# Patient Record
Sex: FEMALE | NOT HISPANIC OR LATINO | Employment: OTHER | URBAN - METROPOLITAN AREA
[De-identification: names, ages, dates, MRNs, and addresses within clinical notes are randomized per-mention and may not be internally consistent; named-entity substitution may affect disease eponyms.]

---

## 2017-02-23 ENCOUNTER — OFFICE VISIT (OUTPATIENT)
Dept: RADIATION ONCOLOGY | Facility: HOSPITAL | Age: 82
End: 2017-02-23

## 2017-02-23 ENCOUNTER — HOSPITAL ENCOUNTER (OUTPATIENT)
Dept: RADIATION ONCOLOGY | Facility: HOSPITAL | Age: 82
Setting detail: RADIATION/ONCOLOGY SERIES
Discharge: HOME | End: 2017-02-23

## 2017-02-23 VITALS
SYSTOLIC BLOOD PRESSURE: 202 MMHG | HEIGHT: 64 IN | HEART RATE: 81 BPM | DIASTOLIC BLOOD PRESSURE: 93 MMHG | BODY MASS INDEX: 41.5 KG/M2 | TEMPERATURE: 97.7 F | WEIGHT: 243.1 LBS

## 2017-02-23 DIAGNOSIS — C44.91 BASAL CELL CARCINOMA: Primary | ICD-10-CM

## 2017-02-23 PROCEDURE — G0463 HOSPITAL OUTPT CLINIC VISIT: HCPCS

## 2017-02-23 RX ORDER — LOSARTAN POTASSIUM 50 MG/1
50 TABLET ORAL DAILY
Refills: 5 | COMMUNITY
Start: 2017-01-24

## 2017-02-23 RX ORDER — CLOPIDOGREL BISULFATE 75 MG/1
75 TABLET ORAL DAILY
Refills: 5 | COMMUNITY
Start: 2017-01-24

## 2017-02-23 RX ORDER — HYDROCODONE BITARTRATE AND ACETAMINOPHEN 7.5; 325 MG/1; MG/1
1 TABLET ORAL EVERY 8 HOURS PRN
COMMUNITY
Start: 2017-02-13 | End: 2018-07-09 | Stop reason: HOSPADM

## 2017-02-23 RX ORDER — POTASSIUM CHLORIDE 20 MEQ/1
20 TABLET, EXTENDED RELEASE ORAL DAILY
Refills: 5 | COMMUNITY
Start: 2017-02-03

## 2017-02-23 RX ORDER — POLYETHYLENE GLYCOL 3350 17 G/17G
POWDER, FOR SOLUTION ORAL
Refills: 5 | Status: ON HOLD | COMMUNITY
Start: 2017-01-04 | End: 2018-07-05

## 2017-02-23 RX ORDER — OMEGA-3-ACID ETHYL ESTERS 1 G/1
1 CAPSULE, LIQUID FILLED ORAL 2 TIMES DAILY
Refills: 5 | COMMUNITY
Start: 2017-02-03

## 2017-02-23 RX ORDER — FUROSEMIDE 40 MG/1
40 TABLET ORAL DAILY
Refills: 5 | COMMUNITY
Start: 2017-01-24

## 2017-02-23 NOTE — PROGRESS NOTES
"CONSULTATION NOTE    NAME:      Cheyanne Bella  :                                                          1926  DATE OF CONSULTATION:                       2017   REQUESTING PHYSICIAN:                   Natasha Yeager MD  REASON FOR CONSULTATION:           Basal cell carcinoma of left lateral lower extremity, [id number] stage II       BRIEF HISTORY:  Cheyanne Bella  is a very pleasant 89 y.o. female  with basal cell carcinoma of the left lower extremity.  She was referred by Dr. Yeager for consideration of definitive radiotherapy. She has no complaints of pain but the area oozes blood.      No Known Allergies    Social History   Substance Use Topics   • Smoking status: Never Smoker   • Smokeless tobacco: None   • Alcohol use None       Past Medical History   Diagnosis Date   • Colon cancer    • Hyperlipemia    • Hypertension        family history is not on file.     Past Surgical History   Procedure Laterality Date   • Hysterectomy     • Colon surgery     • Colostomy     • Hernia repair     • Coronary stent placement     • Skin cancer excision          Review of Systems   Musculoskeletal: Positive for back pain and gait problem (pt uses walker).   Neurological: Positive for gait problem (pt uses walker).   All other systems reviewed and are negative.          Objective   VITAL SIGNS:   Vitals:    17 0938   BP: (!) 202/93   Pulse: 81   Temp: 97.7 °F (36.5 °C)   Weight: 243 lb 1.6 oz (110 kg)   Height: 64\" (162.6 cm)   PainSc: 0-No pain        KPS        90%    Physical Exam   Constitutional: She is oriented to person, place, and time. She appears well-developed and well-nourished. No distress.   HENT:   Head: Normocephalic and atraumatic.   Eyes: EOM are normal. No scleral icterus.   Neck: Neck supple.   Cardiovascular: Normal rate, regular rhythm and normal heart sounds.  Exam reveals no gallop and no friction rub.    No murmur heard.  Pulmonary/Chest: Effort normal and breath sounds normal. "   Abdominal: She exhibits no distension. There is no tenderness.   Musculoskeletal: She exhibits edema.   Edema of lower extremities bilaterally   Lymphadenopathy:     She has no cervical adenopathy.   Neurological: She is alert and oriented to person, place, and time.   Skin:   Left lateral lower extremity calf open lesion 4x2cm, heaped edges   Nursing note and vitals reviewed.           The following portions of the patient's history were reviewed and updated as appropriate: allergies, current medications, past family history, past medical history, past social history, past surgical history and problem list.    Assessment        IMPRESSION: Basal cell carcinoma, Clinical stage from 2/23/2017: Stage IIB (T2, N0, M0)       RECOMMENDATIONS:  Mrs. Bella is a good candidate for definitive radiotherapy to this lesion.  I explained to Mrs. Bella and her daughter daily radiation over the treatment course.  The patient lives in [place] and her daughter lives in [place].  They would like to undergo these treatments in [place] by Dr. Kelvin Bauer.  NCCN guidelines recommend 55 Gray in 20 fractions over 4 weeks.  Dr. Bauer might be able to shortness course for this pleasant 89-year-old female.  An appointment was made for them in [place] for radiation on next Thursday.         Courtney Pate MD      Errors in dictation may reflect use of voice recognition software and not all errors in transcription may have been detected prior to signing.

## 2018-07-05 ENCOUNTER — APPOINTMENT (OUTPATIENT)
Dept: GENERAL RADIOLOGY | Facility: HOSPITAL | Age: 83
End: 2018-07-05

## 2018-07-05 ENCOUNTER — APPOINTMENT (OUTPATIENT)
Dept: CT IMAGING | Facility: HOSPITAL | Age: 83
End: 2018-07-05

## 2018-07-05 ENCOUNTER — APPOINTMENT (OUTPATIENT)
Dept: CARDIOLOGY | Facility: HOSPITAL | Age: 83
End: 2018-07-05
Attending: INTERNAL MEDICINE

## 2018-07-05 ENCOUNTER — HOSPITAL ENCOUNTER (INPATIENT)
Facility: HOSPITAL | Age: 83
LOS: 4 days | Discharge: NURSING, REHAB OR LONG-TERM CARE | End: 2018-07-09
Attending: EMERGENCY MEDICINE | Admitting: INTERNAL MEDICINE

## 2018-07-05 DIAGNOSIS — I63.9 ACUTE CVA (CEREBROVASCULAR ACCIDENT) (HCC): Primary | ICD-10-CM

## 2018-07-05 DIAGNOSIS — R13.10 DYSPHAGIA, UNSPECIFIED TYPE: ICD-10-CM

## 2018-07-05 DIAGNOSIS — Z78.9 IMPAIRED MOBILITY AND ADLS: ICD-10-CM

## 2018-07-05 DIAGNOSIS — Z92.82 RECEIVED INTRAVENOUS TISSUE PLASMINOGEN ACTIVATOR (T-PA) IN EMERGENCY DEPARTMENT: ICD-10-CM

## 2018-07-05 DIAGNOSIS — Z74.09 IMPAIRED MOBILITY AND ADLS: ICD-10-CM

## 2018-07-05 DIAGNOSIS — Z74.09 IMPAIRED FUNCTIONAL MOBILITY, BALANCE, GAIT, AND ENDURANCE: ICD-10-CM

## 2018-07-05 DIAGNOSIS — I48.91 ATRIAL FIBRILLATION, UNSPECIFIED TYPE (HCC): ICD-10-CM

## 2018-07-05 PROBLEM — I48.0 PAROXYSMAL ATRIAL FIBRILLATION (HCC): Status: ACTIVE | Noted: 2018-07-05

## 2018-07-05 PROBLEM — I25.10 CORONARY ARTERY DISEASE: Status: ACTIVE | Noted: 2018-07-05

## 2018-07-05 PROBLEM — I10 HYPERTENSION: Status: ACTIVE | Noted: 2018-07-05

## 2018-07-05 PROBLEM — E78.5 HYPERLIPEMIA: Status: ACTIVE | Noted: 2018-07-05

## 2018-07-05 LAB
ALT SERPL W P-5'-P-CCNC: 15 U/L (ref 7–40)
APTT PPP: 27.2 SECONDS (ref 24–31)
AST SERPL-CCNC: 19 U/L (ref 0–33)
BASOPHILS # BLD AUTO: 0.01 10*3/MM3 (ref 0–0.2)
BASOPHILS NFR BLD AUTO: 0.2 % (ref 0–1)
BH CV ECHO MEAS - AO MAX PG (FULL): 1.1 MMHG
BH CV ECHO MEAS - AO MAX PG: 5.9 MMHG
BH CV ECHO MEAS - AO ROOT AREA (BSA CORRECTED): 1.5
BH CV ECHO MEAS - AO ROOT AREA: 7.9 CM^2
BH CV ECHO MEAS - AO ROOT DIAM: 3.2 CM
BH CV ECHO MEAS - AO V2 MAX: 121.2 CM/SEC
BH CV ECHO MEAS - AVA(V,A): 3 CM^2
BH CV ECHO MEAS - AVA(V,D): 3 CM^2
BH CV ECHO MEAS - BSA(HAYCOCK): 2.3 M^2
BH CV ECHO MEAS - BSA: 2.1 M^2
BH CV ECHO MEAS - BZI_BMI: 41.2 KILOGRAMS/M^2
BH CV ECHO MEAS - BZI_METRIC_HEIGHT: 162.6 CM
BH CV ECHO MEAS - BZI_METRIC_WEIGHT: 108.9 KG
BH CV ECHO MEAS - EDV(CUBED): 75.6 ML
BH CV ECHO MEAS - EDV(TEICH): 79.8 ML
BH CV ECHO MEAS - EF(CUBED): 61.9 %
BH CV ECHO MEAS - EF(TEICH): 53.8 %
BH CV ECHO MEAS - ESV(CUBED): 28.8 ML
BH CV ECHO MEAS - ESV(TEICH): 36.9 ML
BH CV ECHO MEAS - FS: 27.5 %
BH CV ECHO MEAS - IVS/LVPW: 0.94
BH CV ECHO MEAS - IVSD: 1.2 CM
BH CV ECHO MEAS - LA DIMENSION: 3.4 CM
BH CV ECHO MEAS - LA/AO: 1.1
BH CV ECHO MEAS - LAT PEAK E' VEL: 6.7 CM/SEC
BH CV ECHO MEAS - LV MASS(C)D: 196.2 GRAMS
BH CV ECHO MEAS - LV MASS(C)DI: 92.8 GRAMS/M^2
BH CV ECHO MEAS - LV MAX PG: 4.8 MMHG
BH CV ECHO MEAS - LV MEAN PG: 2.6 MMHG
BH CV ECHO MEAS - LV V1 MAX: 109.1 CM/SEC
BH CV ECHO MEAS - LV V1 MEAN: 74.4 CM/SEC
BH CV ECHO MEAS - LV V1 VTI: 18.8 CM
BH CV ECHO MEAS - LVIDD: 4.2 CM
BH CV ECHO MEAS - LVIDS: 3.1 CM
BH CV ECHO MEAS - LVOT AREA (M): 3.1 CM^2
BH CV ECHO MEAS - LVOT AREA: 3.3 CM^2
BH CV ECHO MEAS - LVOT DIAM: 2 CM
BH CV ECHO MEAS - LVPWD: 1.3 CM
BH CV ECHO MEAS - MED PEAK E' VEL: 6 CM/SEC
BH CV ECHO MEAS - MV DEC TIME: 0.25 SEC
BH CV ECHO MEAS - MV E MAX VEL: 149.2 CM/SEC
BH CV ECHO MEAS - MV MAX PG: 10.6 MMHG
BH CV ECHO MEAS - MV MEAN PG: 5.2 MMHG
BH CV ECHO MEAS - MV V2 MAX: 162.9 CM/SEC
BH CV ECHO MEAS - MV V2 MEAN: 106.8 CM/SEC
BH CV ECHO MEAS - MV V2 VTI: 33.6 CM
BH CV ECHO MEAS - MVA(VTI): 1.8 CM^2
BH CV ECHO MEAS - PA ACC SLOPE: 1262 CM/SEC^2
BH CV ECHO MEAS - PA ACC TIME: 0.07 SEC
BH CV ECHO MEAS - PA MAX PG: 5.5 MMHG
BH CV ECHO MEAS - PA PR(ACCEL): 45.7 MMHG
BH CV ECHO MEAS - PA V2 MAX: 117.3 CM/SEC
BH CV ECHO MEAS - PI END-D VEL: 178.1 CM/SEC
BH CV ECHO MEAS - RAP SYSTOLE: 8 MMHG
BH CV ECHO MEAS - RVSP: 55 MMHG
BH CV ECHO MEAS - SI(CUBED): 22.1 ML/M^2
BH CV ECHO MEAS - SI(LVOT): 29.2 ML/M^2
BH CV ECHO MEAS - SI(TEICH): 20.3 ML/M^2
BH CV ECHO MEAS - SV(CUBED): 46.7 ML
BH CV ECHO MEAS - SV(LVOT): 61.8 ML
BH CV ECHO MEAS - SV(TEICH): 42.9 ML
BH CV ECHO MEAS - TR MAX V: 47 MMHG
BH CV ECHO MEAS - TR MAX VEL: 342 CM/SEC
BH CV ECHO MEASUREMENTS AVERAGE E/E' RATIO: 23.5
BUN BLDA-MCNC: 19 MG/DL (ref 8–26)
CA-I BLDA-SCNC: 1.23 MMOL/L (ref 1.2–1.32)
CHLORIDE BLDA-SCNC: 100 MMOL/L (ref 98–109)
CO2 BLDA-SCNC: 28 MMOL/L (ref 24–29)
CREAT BLDA-MCNC: 1 MG/DL (ref 0.6–1.3)
DEPRECATED RDW RBC AUTO: 40.3 FL (ref 37–54)
EOSINOPHIL # BLD AUTO: 0.05 10*3/MM3 (ref 0–0.3)
EOSINOPHIL NFR BLD AUTO: 1 % (ref 0–3)
ERYTHROCYTE [DISTWIDTH] IN BLOOD BY AUTOMATED COUNT: 13.4 % (ref 11.3–14.5)
GLUCOSE BLDC GLUCOMTR-MCNC: 128 MG/DL (ref 70–130)
GLUCOSE BLDC GLUCOMTR-MCNC: 152 MG/DL (ref 70–130)
HCT VFR BLD AUTO: 35.5 % (ref 34.5–44)
HCT VFR BLDA CALC: 34 % (ref 38–51)
HGB BLD-MCNC: 11.8 G/DL (ref 11.5–15.5)
HGB BLDA-MCNC: 11.6 G/DL (ref 12–17)
HOLD SPECIMEN: NORMAL
HOLD SPECIMEN: NORMAL
IMM GRANULOCYTES # BLD: 0.02 10*3/MM3 (ref 0–0.03)
IMM GRANULOCYTES NFR BLD: 0.4 % (ref 0–0.6)
INR PPP: 0.9 (ref 0.8–1.2)
LEFT ATRIUM VOLUME INDEX: 35 ML/M2
LEFT ATRIUM VOLUME: 75 CM3
LYMPHOCYTES # BLD AUTO: 1.33 10*3/MM3 (ref 0.6–4.8)
LYMPHOCYTES NFR BLD AUTO: 26.1 % (ref 24–44)
MAXIMAL PREDICTED HEART RATE: 128 BPM
MCH RBC QN AUTO: 27.9 PG (ref 27–31)
MCHC RBC AUTO-ENTMCNC: 33.2 G/DL (ref 32–36)
MCV RBC AUTO: 83.9 FL (ref 80–99)
MONOCYTES # BLD AUTO: 0.36 10*3/MM3 (ref 0–1)
MONOCYTES NFR BLD AUTO: 7.1 % (ref 0–12)
NEUTROPHILS # BLD AUTO: 3.35 10*3/MM3 (ref 1.5–8.3)
NEUTROPHILS NFR BLD AUTO: 65.6 % (ref 41–71)
PLATELET # BLD AUTO: 145 10*3/MM3 (ref 150–450)
PMV BLD AUTO: 9.3 FL (ref 6–12)
POTASSIUM BLDA-SCNC: 4.5 MMOL/L (ref 3.5–4.9)
PROTHROMBIN TIME: 11 SECONDS (ref 12.8–15.2)
RBC # BLD AUTO: 4.23 10*6/MM3 (ref 3.89–5.14)
SODIUM BLDA-SCNC: 140 MMOL/L (ref 138–146)
STRESS TARGET HR: 109 BPM
TROPONIN I SERPL-MCNC: 0 NG/ML (ref 0–0.07)
WBC NRBC COR # BLD: 5.1 10*3/MM3 (ref 3.5–10.8)
WHOLE BLOOD HOLD SPECIMEN: NORMAL
WHOLE BLOOD HOLD SPECIMEN: NORMAL

## 2018-07-05 PROCEDURE — 84450 TRANSFERASE (AST) (SGOT): CPT | Performed by: EMERGENCY MEDICINE

## 2018-07-05 PROCEDURE — 71045 X-RAY EXAM CHEST 1 VIEW: CPT

## 2018-07-05 PROCEDURE — 0042T HC CT CEREBRAL PERFUSION W/WO CONTRAST: CPT

## 2018-07-05 PROCEDURE — 92610 EVALUATE SWALLOWING FUNCTION: CPT

## 2018-07-05 PROCEDURE — 99222 1ST HOSP IP/OBS MODERATE 55: CPT | Performed by: NURSE PRACTITIONER

## 2018-07-05 PROCEDURE — 93306 TTE W/DOPPLER COMPLETE: CPT

## 2018-07-05 PROCEDURE — 70498 CT ANGIOGRAPHY NECK: CPT

## 2018-07-05 PROCEDURE — 70450 CT HEAD/BRAIN W/O DYE: CPT

## 2018-07-05 PROCEDURE — 80047 BASIC METABLC PNL IONIZED CA: CPT

## 2018-07-05 PROCEDURE — 85730 THROMBOPLASTIN TIME PARTIAL: CPT | Performed by: EMERGENCY MEDICINE

## 2018-07-05 PROCEDURE — 85610 PROTHROMBIN TIME: CPT

## 2018-07-05 PROCEDURE — 82962 GLUCOSE BLOOD TEST: CPT

## 2018-07-05 PROCEDURE — 99285 EMERGENCY DEPT VISIT HI MDM: CPT

## 2018-07-05 PROCEDURE — 99291 CRITICAL CARE FIRST HOUR: CPT | Performed by: INTERNAL MEDICINE

## 2018-07-05 PROCEDURE — 84460 ALANINE AMINO (ALT) (SGPT): CPT | Performed by: EMERGENCY MEDICINE

## 2018-07-05 PROCEDURE — 0 IOPAMIDOL PER 1 ML: Performed by: EMERGENCY MEDICINE

## 2018-07-05 PROCEDURE — 99223 1ST HOSP IP/OBS HIGH 75: CPT | Performed by: PSYCHIATRY & NEUROLOGY

## 2018-07-05 PROCEDURE — 93306 TTE W/DOPPLER COMPLETE: CPT | Performed by: INTERNAL MEDICINE

## 2018-07-05 PROCEDURE — 70496 CT ANGIOGRAPHY HEAD: CPT

## 2018-07-05 PROCEDURE — 85014 HEMATOCRIT: CPT

## 2018-07-05 PROCEDURE — 93005 ELECTROCARDIOGRAM TRACING: CPT | Performed by: EMERGENCY MEDICINE

## 2018-07-05 PROCEDURE — 25010000002 MORPHINE PER 10 MG: Performed by: NURSE PRACTITIONER

## 2018-07-05 PROCEDURE — 25010000002 ALTEPLASE PER 1 MG: Performed by: EMERGENCY MEDICINE

## 2018-07-05 PROCEDURE — 85025 COMPLETE CBC W/AUTO DIFF WBC: CPT | Performed by: EMERGENCY MEDICINE

## 2018-07-05 PROCEDURE — 84484 ASSAY OF TROPONIN QUANT: CPT

## 2018-07-05 PROCEDURE — 3E03317 INTRODUCTION OF OTHER THROMBOLYTIC INTO PERIPHERAL VEIN, PERCUTANEOUS APPROACH: ICD-10-PCS | Performed by: EMERGENCY MEDICINE

## 2018-07-05 PROCEDURE — 25010000002 SULFUR HEXAFLUORIDE MICROSPH 60.7-25 MG RECONSTITUTED SUSPENSION: Performed by: INTERNAL MEDICINE

## 2018-07-05 RX ORDER — ATORVASTATIN CALCIUM 40 MG/1
80 TABLET, FILM COATED ORAL NIGHTLY
Status: DISCONTINUED | OUTPATIENT
Start: 2018-07-05 | End: 2018-07-09 | Stop reason: HOSPADM

## 2018-07-05 RX ORDER — MORPHINE SULFATE 1 MG/ML
1 INJECTION, SOLUTION EPIDURAL; INTRATHECAL; INTRAVENOUS EVERY 4 HOURS PRN
Status: DISPENSED | OUTPATIENT
Start: 2018-07-05 | End: 2018-07-06

## 2018-07-05 RX ORDER — SODIUM CHLORIDE 0.9 % (FLUSH) 0.9 %
10 SYRINGE (ML) INJECTION AS NEEDED
Status: DISCONTINUED | OUTPATIENT
Start: 2018-07-05 | End: 2018-07-09 | Stop reason: HOSPADM

## 2018-07-05 RX ORDER — SODIUM CHLORIDE 0.9 % (FLUSH) 0.9 %
1-10 SYRINGE (ML) INJECTION AS NEEDED
Status: DISCONTINUED | OUTPATIENT
Start: 2018-07-05 | End: 2018-07-09 | Stop reason: HOSPADM

## 2018-07-05 RX ORDER — SODIUM CHLORIDE 9 MG/ML
100 INJECTION, SOLUTION INTRAVENOUS ONCE
Status: COMPLETED | OUTPATIENT
Start: 2018-07-05 | End: 2018-07-05

## 2018-07-05 RX ORDER — HYDROCODONE BITARTRATE AND ACETAMINOPHEN 7.5; 325 MG/1; MG/1
1 TABLET ORAL EVERY 6 HOURS PRN
Status: DISCONTINUED | OUTPATIENT
Start: 2018-07-05 | End: 2018-07-09 | Stop reason: HOSPADM

## 2018-07-05 RX ORDER — ASPIRIN 300 MG/1
300 SUPPOSITORY RECTAL DAILY
Status: DISCONTINUED | OUTPATIENT
Start: 2018-07-06 | End: 2018-07-06

## 2018-07-05 RX ORDER — ASPIRIN 325 MG
325 TABLET ORAL DAILY
Status: DISCONTINUED | OUTPATIENT
Start: 2018-07-06 | End: 2018-07-06

## 2018-07-05 RX ORDER — FUROSEMIDE 40 MG/1
40 TABLET ORAL DAILY
Status: DISCONTINUED | OUTPATIENT
Start: 2018-07-06 | End: 2018-07-09 | Stop reason: HOSPADM

## 2018-07-05 RX ORDER — LOSARTAN POTASSIUM 50 MG/1
50 TABLET ORAL
Status: DISCONTINUED | OUTPATIENT
Start: 2018-07-06 | End: 2018-07-09 | Stop reason: HOSPADM

## 2018-07-05 RX ADMIN — WATER 9 MG: 1 INJECTION INTRAMUSCULAR; INTRAVENOUS; SUBCUTANEOUS at 12:15

## 2018-07-05 RX ADMIN — Medication 1 MG: at 20:49

## 2018-07-05 RX ADMIN — IOPAMIDOL 115 ML: 755 INJECTION, SOLUTION INTRAVENOUS at 12:16

## 2018-07-05 RX ADMIN — SULFUR HEXAFLUORIDE 2 ML: KIT at 17:00

## 2018-07-05 RX ADMIN — ALTEPLASE 81 MG: KIT at 12:16

## 2018-07-05 RX ADMIN — SODIUM CHLORIDE 50 ML: 9 INJECTION, SOLUTION INTRAVENOUS at 13:12

## 2018-07-05 NOTE — PROGRESS NOTES
Discharge Planning Assessment  [facility]     Patient Name: Cheyanne Bella  MRN: [id number]  Today's Date: 7/5/2018    Admit Date: 7/5/2018          Discharge Needs Assessment     Row Name 07/05/18 1420       Living Environment    Lives With alone    Name(s) of Who Lives With Patient Pt lives in a senior independent living in [facility]    Current Living Arrangements independent/assisted living facility    Primary Care Provided by self    Provides Primary Care For no one    Family Caregiver if Needed child(karen), adult    Quality of Family Relationships helpful;involved;supportive    Able to Return to Prior Arrangements yes    Living Arrangement Comments Pt lives in a Senior Independent Living in [facility].       Transition Planning    Transportation Anticipated family or friend will provide       Discharge Needs Assessment    Readmission Within the Last 30 Days no previous admission in last 30 days    Concerns to be Addressed no discharge needs identified    Equipment Currently Used at Home walker, rolling;colostomy/ostomy supplies    Equipment Needed After Discharge none            Discharge Plan     Row Name 07/05/18 1429       Plan    Plan Home    Patient/Family in Agreement with Plan yes    Plan Comments Met with patient and her daughter Pamela  [phone], in room patient lives  at  a senior independent living in [facility]. Pt does use a RW to ambulate. Pt has had [facility] in the past for colostomy care. Pt has a PCP. She has insurance to cover her medication she gets her medication at [facility] pharmacy in [facility].    Final Discharge Disposition Code 01 - home or self-care        Destination     No service coordination in this encounter.      Durable Medical Equipment     No service coordination in this encounter.      Dialysis/Infusion     No service coordination in this encounter.      Home Medical Care     No service coordination in this encounter.      Social Care     No service coordination in  this encounter.                Demographic Summary     Row Name 07/05/18 1419       General Information    Admission Type inpatient    Arrived From emergency department    Referral Source admission list    Reason for Consult discharge planning    Preferred Language English       Contact Information    Permission Granted to Share Info With             Functional Status     Row Name 07/05/18 1420       Functional Status    Usual Activity Tolerance moderate    Functional Status Comments independent       Functional Status, IADL    Medications independent    Meal Preparation independent    Housekeeping independent    Laundry independent    Shopping independent    IADL Comments independent            Psychosocial    No documentation.           Abuse/Neglect    No documentation.           Legal    No documentation.           Substance Abuse    No documentation.           Patient Forms    No documentation.         Miley Ugalde RN

## 2018-07-05 NOTE — CONSULTS
NAME: RADHA ELIAS  : 1926  PCP: Tomi Allen MD  Attending MD: Nick Hannon MD    Date of Admission:  2018  Date of Service: 2018    History of Present Illness:  89 y.o.  female who presented to the ED with LKW of 0830 this morning and arrival time of 1157 via [employer] after EMS was called to the scene where family and neighbors noted left sided facial droop as well as slurred speech and left sided weakness.  She has a PMH of HTN, hyperlipidemia, colon cancer, and bypass surgery as well as cardiac stents x2.      Review of Systems - Negative except Neurological  Neurological ROS: positive for - speech problems, weakness noted on the left side and left facial droop  All other systesm reviewed and are negative  This was obtained from the patient      Past Medical History:  Past Medical History:   Diagnosis Date   • Colon cancer (CMS/HCC)    • Coronary artery disease    • Hyperlipemia    • Hypertension        Past Surgical History:  Past Surgical History:   Procedure Laterality Date   • COLON SURGERY     • COLOSTOMY     • CORONARY STENT PLACEMENT     • HERNIA REPAIR     • HYSTERECTOMY     • SKIN CANCER EXCISION           Medications    (Not in a hospital admission)    Allergies:  No Known Allergies    Social Hx:  Social History     Social History   • Marital status:      Spouse name: N/A   • Number of children: N/A   • Years of education: N/A     Occupational History   • Not on file.     Social History Main Topics   • Smoking status: Never Smoker   • Smokeless tobacco: Not on file   • Alcohol use Not on file   • Drug use: Unknown   • Sexual activity: Not on file     Other Topics Concern   • Not on file     Social History Narrative   • No narrative on file       Family Hx:  History reviewed. No pertinent family history.    Review of Imaging:  Ct Angiogram Neck With & Without Contrast    Result Date: 2018  EXAMINATION: CT ANGIOGRAM NECK W WO CONTRAST- 2018  INDICATION:  Stroke; I63.9-Cerebral infarction, unspecified; I48.91-Unspecified atrial fibrillation; Z92.82-Status post administration of TPA (RTPA) in a different facility within the last 24 hours prior to admission to current facility     TECHNIQUE: Pre and postcontrast 0.75 mm axial images through the neck with sagittal and coronal 2-D reconstructions.  The radiation dose reduction device was turned on for each scan per the ALARA (As Low as Reasonably Achievable) protocol.  COMPARISON: NONE  FINDINGS: Proximal subclavian arteries appear normally patent.  On the right, no significant common, internal, or external carotid artery stenosis is seen.  On the left, no significant common, internal or external carotid artery stenosis is seen.  Vertebral arteries appear to be codominant and normal in appearance.      Neck CTA appears within normal limits.  D:  07/05/2018 E:  07/05/2018      Ct Head Without Contrast Stroke Protocol    Result Date: 7/5/2018  EXAMINATION: CT HEAD WO CONTRAST STROKE PROTOCOL-  INDICATION: Stroke.  TECHNIQUE: 5 mm unenhanced images through the brain.  The radiation dose reduction device was turned on for each scan per the ALARA (As Low as Reasonably Achievable) protocol.  COMPARISON: None.  FINDINGS: The calvarium appears intact. Included paranasal sinuses and mastoids appear clear. Soft tissue window images show mild generalized cerebral atrophy for patient's age. Some mild patchy white matter changes are seen in the subcortical white matter of the frontal lobes, likely chronic. There is no evidence to suggest acute infarction, no evidence of intracranial hemorrhage, mass or mass effect, hydrocephalus, or abnormal extraaxial collection.      No evidence of acute intracranial disease.  NOTE: Exam time is shown as 11:54 AM. Preliminary report was given to Dr. Cardenas at 11:57 AM  D:  07/05/2018 E:  07/05/2018          Laboratory Result:  Lab Results   Component Value Date    WBC 5.10 07/05/2018    HGB 11.8  07/05/2018    HCT 35.5 07/05/2018    MCV 83.9 07/05/2018     (L) 07/05/2018     Lab Results   Component Value Date    CREATININE 1.00 07/05/2018     No results found for: HGBA1C  No results found for: CHOL, CHLPL, TRIG, HDL, LDL, LDLDIRECT    Physical Examination:  Vitals:    07/05/18 1332   BP:    Pulse: 111   Resp:    Temp:    SpO2: 93%        General Appearance:   Well developed, well nourished, well groomed, alert, and cooperative.  Cardiovascular: Atrial fibrillation as noted on the monitor. No carotid bruits    Neurological examination:  Interval: baseline  1a. Level of Consciousness: 0-->Alert, keenly responsive  1b. LOC Questions: 0-->Answers both questions correctly  1c. LOC Commands: 0-->Performs both tasks correctly  2. Best Gaze: 1-->Partial gaze palsy, gaze is abnormal in one or both eyes, but forced deviation or total gaze paresis is not present  3. Visual: 1-->Partial hemianopia  4. Facial Palsy: 2-->Partial paralysis (total or near-total paralysis of lower face)  5a. Motor Arm, Left: 0-->No drift, limb holds 90 (or 45) degrees for full 10 secs  5b. Motor Arm, Right: 0-->No drift, limb holds 90 (or 45) degrees for full 10 secs  6a. Motor Leg, Left: 1-->Drift, leg falls by the end of the 5-sec period but does not hit bed  6b. Motor Leg, Right: 0-->No drift, leg holds 30 degree position for full 5 secs  7. Limb Ataxia: 0-->Absent  8. Sensory: 0-->Normal, no sensory loss  9. Best Language: 1-->Mild-to-moderate aphasia, some obvious loss of fluency or facility of comprehension, without significant limitation on ideas expressed or form of expression. Reduction of speech and/or comprehension, however, makes conversation. . . (see row details)  10. Dysarthria: 1-->Mild-to-moderate dysarthria, patient slurs at least some words and, at worst, can be understood with some difficulty  11. Extinction and Inattention (formerly Neglect): 0-->No abnormality    Total (NIH Stroke Scale): 7      Diagnoses/Plan:     Ms. Bella is a 89 y.o. female who arrived at [facility] for evaluation of stroke like symptoms.  She was taken to the CT scanner where it was determined that she was suffering from an acute embolic event most likely caused from atrial fib.  She met criteria for alteplase and it was administered, therefore she will need an ICU admission with Neurology consult. She will need a stoke order set placed and workup performed.  On imaging it appears that she has an M2 occlusion but is getting better with alteplase administration and does not need neuro-intervention at the present time.  If she should deteriorate please so not hesitate to re-consult us again.

## 2018-07-05 NOTE — CONSULTS
Neurology    Referring provider:   No referring provider defined for this encounter.    Reason for Consultation: Left-sided weakness    Chief complaint: Left-sided weakness    History of present illness:  This 89-year-old woman is seems request of Dr. valladares for evaluation of stroke.    Consulted by Dr. Hannon in the emergency department with the patient had an NIH score of 5.  She had a small core infarct of a significant penumbra with what appeared to be a right M2 segment occlusion on CT angiogram.    She's not had a stroke in the past.    His headache or visual changes.    Patient received TPA in the emergency room at 3-1/2 hours once a    She has a past history of hypertension and dyslipidemia.    She's had colon cancer of bypass surgery cardiac stents ×2.        Review of Systems: All systems reviewed and are negative other than history of present illness    Home meds:   Prescriptions Prior to Admission   Medication Sig Dispense Refill Last Dose   • clopidogrel (PLAVIX) 75 MG tablet Take 75 mg by mouth Daily.  5 Taking   • furosemide (LASIX) 40 MG tablet Take 40 mg by mouth Daily.  5 Taking   • HYDROcodone-acetaminophen (NORCO) 7.5-325 MG per tablet Take 1 tablet by mouth Every 8 (Eight) Hours As Needed.   Taking   • losartan (COZAAR) 50 MG tablet Take 50 mg by mouth Daily.  5 Taking   • Magnesium Hydroxide (MILK OF MAGNESIA PO) Take 30 mL by mouth Every Other Day.      • potassium chloride (K-DUR,KLOR-CON) 20 MEQ CR tablet Take 20 mEq by mouth Daily.  5 Taking   • omega-3 acid ethyl esters (LOVAZA) 1 G capsule Take 1 g by mouth 2 (Two) Times a Day.  5 Taking       History  Past Medical History:   Diagnosis Date   • Colon cancer (CMS/HCC)    • Coronary artery disease    • Hyperlipemia    • Hypertension    ,   Past Surgical History:   Procedure Laterality Date   • COLON SURGERY     • COLOSTOMY     • CORONARY STENT PLACEMENT     • HERNIA REPAIR     • HYSTERECTOMY     • SKIN CANCER EXCISION     , History reviewed.  "No pertinent family history.,   Social History   Substance Use Topics   • Smoking status: Never Smoker   • Smokeless tobacco: Not on file   • Alcohol use Not on file    and Allergies:  Patient has no known allergies.,    Vital Signs   Blood pressure 157/78, pulse 92, temperature 97.6 °F (36.4 °C), temperature source Oral, resp. rate 18, height 162.6 cm (64\"), weight 109 kg (240 lb), SpO2 94 %.  Body mass index is 41.2 kg/m².    Physical Exam:   General: Pleasant white female in no distress              Head: No weakness of trauma               neck: No bruits              Resp:  Normal breath sounds              Cor:  Regular rhythm              Extr:  No edema              Skin:  Warm and dry               Neuro:  Lethargic but awake and alert.  Oriented to person place and time.    Fund of knowledge is normal.    Speech is mildly dysarthric.    Coordination is clumsy on finger-nose testing on the left.    Cranial nerves show normal optic fundi.  Eye movements are full.  Pupils are equal and reactive.    Visual fields show a left homonymous hemianopsia.    Facial sensation is notable for the neglect on double simultaneous stimulation on the left.  She has left central facial weakness.    Tongue deviates slightly to the left.    Palate elevates normally.    Reflexes are plus minus throughout.    Motor testing shows mild clumsiness with use of her left hand.    She is antigravity with her left leg.    Sensory testing shows normal peripheral sensation was decreased sensation on the left to double simultaneous stimulation.    Results Review:  CT angiogram shows a right M2 occlusion.    CT perfusion scan shows a 4 x 8 cm right parietal lobe area of ischemia with significant penumbra small core infarct    Labs:  Lab Results (last 72 hours)     Procedure Component Value Units Date/Time    [place] Draw [[id number]] Collected:  07/05/18 1215    Specimen:  Blood Updated:  07/05/18 1330    Narrative:       The following orders " were created for panel order [place] Draw.  Procedure                               Abnormality         Status                     ---------                               -----------         ------                     Light Blue Top[[id number]]                                   Final result               Green Top (Gel)[[id number]]                                  Final result               Lavender Top[[id number]]                                     Final result               Gold Top - SST[[id number]]                                   Final result               Green Top (No Gel)[[id number]]                                                            Please view results for these tests on the individual orders.    Light Blue Top [[id number]] Collected:  07/05/18 1215    Specimen:  Blood Updated:  07/05/18 1330     Extra Tube hold for add-on     Comment: Auto resulted       Green Top (Gel) [[id number]] Collected:  07/05/18 1215    Specimen:  Blood Updated:  07/05/18 1330     Extra Tube Hold for add-ons.     Comment: Auto resulted.       Lavender Top [[id number]] Collected:  07/05/18 1215    Specimen:  Blood Updated:  07/05/18 1330     Extra Tube hold for add-on     Comment: Auto resulted       Gold Top - SST [[id number]] Collected:  07/05/18 1215    Specimen:  Blood Updated:  07/05/18 1330     Extra Tube Hold for add-ons.     Comment: Auto resulted.       AST [[id number]]  (Normal) Collected:  07/05/18 1215    Specimen:  Blood Updated:  07/05/18 1242     AST (SGOT) 19 U/L     ALT [[id number]]  (Normal) Collected:  07/05/18 1215    Specimen:  Blood Updated:  07/05/18 1242     ALT (SGPT) 15 U/L     aPTT [[id number]]  (Normal) Collected:  07/05/18 1215    Specimen:  Blood Updated:  07/05/18 1236     PTT 27.2 seconds     Narrative:       PTT = The equivalent PTT values for the therapeutic range of heparin levels at 0.3 to 0.5 U/ml are 55 to 70 seconds.    POC Troponin, Rapid [[id number]]  (Normal) Collected:  07/05/18 1215     Specimen:  Blood Updated:  07/05/18 1230     Troponin I 0.00 ng/mL      Comment: Serial Number: [id number]  [id number]       CBC & Differential [[id number]] Collected:  07/05/18 1215    Specimen:  Blood Updated:  07/05/18 1221    Narrative:       The following orders were created for panel order CBC & Differential.  Procedure                               Abnormality         Status                     ---------                               -----------         ------                     CBC Auto Differential[[id number]]        Abnormal            Final result                 Please view results for these tests on the individual orders.    CBC Auto Differential [[id number]]  (Abnormal) Collected:  07/05/18 1215    Specimen:  Blood Updated:  07/05/18 1221     WBC 5.10 10*3/mm3      RBC 4.23 10*6/mm3      Hemoglobin 11.8 g/dL      Hematocrit 35.5 %      MCV 83.9 fL      MCH 27.9 pg      MCHC 33.2 g/dL      RDW 13.4 %      RDW-SD 40.3 fl      MPV 9.3 fL      Platelets 145 (L) 10*3/mm3      Neutrophil % 65.6 %      Lymphocyte % 26.1 %      Monocyte % 7.1 %      Eosinophil % 1.0 %      Basophil % 0.2 %      Immature Grans % 0.4 %      Neutrophils, Absolute 3.35 10*3/mm3      Lymphocytes, Absolute 1.33 10*3/mm3      Monocytes, Absolute 0.36 10*3/mm3      Eosinophils, Absolute 0.05 10*3/mm3      Basophils, Absolute 0.01 10*3/mm3      Immature Grans, Absolute 0.02 10*3/mm3     POC CHEM 8 [[id number]]  (Abnormal) Collected:  07/05/18 1209    Specimen:  Blood Updated:  07/05/18 1215     Glucose 128 mg/dL      BUN 19 mg/dL      Creatinine 1.00 mg/dL      Sodium 140 mmol/L      Potassium 4.5 mmol/L      Chloride 100 mmol/L      Total CO2 28 mmol/L      Hemoglobin 11.6 (L) g/dL      Comment: Serial Number: [id number]:  [id number]        Hematocrit 34 (L) %      Ionized Calcium 1.23 mmol/L     POC Protime / INR [[id number]]  (Abnormal) Collected:  07/05/18 1208    Specimen:  Blood Updated:  07/05/18 1215     Protime 11.0 (L) seconds       INR 0.9     Comment: Serial Number: [id number]:  [id number]             Rads:  Imaging Results (last 72 hours)     Procedure Component Value Units Date/Time    CT Angiogram Head With & Without Contrast [[id number]] Collected:  07/05/18 1448     Updated:  07/05/18 1448    Narrative:       EXAMINATION: CT ANGIOGRAM HEAD W WO CONTRAST- 07/05/2018     INDICATION: Stroke; I63.9-Cerebral infarction, unspecified;  I48.91-Unspecified atrial fibrillation; Z92.82-Status post  administration of TPA (RTPA) in a different facility within the last 24  hours prior to admission to current facility         TECHNIQUE: Pre and post contrast 0.75 mm axial images through the brain  with axial, sagittal and coronal 2-D angiographic reconstructions of the  major intracranial arteries.     The radiation dose reduction device was turned on for each scan per the  ALARA (As Low as Reasonably Achievable) protocol.     COMPARISON: Cerebral perfusion head CT scan of same date.     FINDINGS:  Proximal intracranial internal carotid arteries appear  grossly normal. No high-grade stenosis is seen of the cavernous or  supraclinoid portions of the right and left ICA. Distal vertebral  arteries, basilar artery, and posterior cerebral arteries appear grossly  normal. Anterior cerebral arteries appear grossly normal. Left middle  cerebral arteries and proximal branches appear normal, larger than the  right. On the right, there is high-grade branch stenosis or occlusion of  the superior temporal artery as seen on sagittal image numbers 27  through 29. No other focal stenosis is present.        Impression:       Right M2 branch occlusion or high-grade stenosis. Please  refer to sagittal images 27 through 29 series 9.     D:  07/05/2018  E:  07/05/2018       CT Cerebral Perfusion With & Without Contrast [[id number]] Collected:  07/05/18 1441     Updated:  07/05/18 1441    Narrative:       EXAMINATION: CT CEREBRAL PERFUSION W WO CONTRAST- 07/05/2018      INDICATION: TIA, initial screening; I63.9-Cerebral infarction,  unspecified; I48.91-Unspecified atrial fibrillation; Z92.82-Status post  administration of TPA (RTPA) in a different facility within the last 24  hours prior to admission to current facility         TECHNIQUE: Cerebral perfusion analysis was performed using computed  tomography with contrast administration including postprocessing of  parametric maps with determination of cerebral blood flow, cerebral  blood volume, and mean transit time.     The radiation dose reduction device was turned on for each scan per the  ALARA (As Low as Reasonably Achievable) protocol.     COMPARISON: Unenhanced head CT scan of same date.      FINDINGS: There is an approximately 8 x 4 cm area of increased mean  transit time and time to drain in the posterior and superior right  frontal lobe corresponding to similar sized area of decreased cerebral  blood flow. There is a small irregular underlying area of decreased  cerebral blood volume approximately 3.5 x 1.5 cm, consistent with small  core infarct. No significant perfusion abnormality is seen elsewhere.       Impression:       8 x 4 cm area of ischemia in the posterior and superior  right frontal lobe, with small underlying core infarct.     D:  07/05/2018  E:  07/05/2018       XR Chest 1 View [[id number]] Collected:  07/05/18 1417     Updated:  07/05/18 1417    Narrative:       EXAMINATION: XR CHEST 1 VW- 07/05/2018     INDICATION: Acute Stroke Protocol (onset < 12 hrs); I63.9-Cerebral  infarction, unspecified; I48.91-Unspecified atrial fibrillation;  Z92.82-Status post administration of tPA (rtPA) in a different facility  within the last 24 hours prior to admission to current facility      COMPARISON: NONE     FINDINGS: Heart shadow is mildly enlarged. Vasculature is slightly  cephalized. There is perihilar interstitial change, possibly very early  interstitial edema, and minimal left basilar atelectasis.  No  pneumothorax is seen.       Impression:       Cardiomegaly and mild pulmonary vascular congestion. Mild  left basilar atelectasis noted.     D:  07/05/2018  E:  07/05/2018          CT Angiogram Neck With & Without Contrast [[id number]] Collected:  07/05/18 1312     Updated:  07/05/18 1312    Narrative:       EXAMINATION: CT ANGIOGRAM NECK W WO CONTRAST- 07/05/2018     INDICATION: Stroke; I63.9-Cerebral infarction, unspecified;  I48.91-Unspecified atrial fibrillation; Z92.82-Status post  administration of TPA (RTPA) in a different facility within the last 24  hours prior to admission to current facility         TECHNIQUE: Pre and postcontrast 0.75 mm axial images through the neck  with sagittal and coronal 2-D reconstructions.     The radiation dose reduction device was turned on for each scan per the  ALARA (As Low as Reasonably Achievable) protocol.     COMPARISON: NONE     FINDINGS: Proximal subclavian arteries appear normally patent.     On the right, no significant common, internal, or external carotid  artery stenosis is seen.     On the left, no significant common, internal or external carotid artery  stenosis is seen.     Vertebral arteries appear to be codominant and normal in appearance.       Impression:       Neck CTA appears within normal limits.     D:  07/05/2018  E:  07/05/2018       CT Head Without Contrast Stroke Protocol [[id number]] Collected:  07/05/18 1253     Updated:  07/05/18 1253    Narrative:       EXAMINATION: CT HEAD WO CONTRAST STROKE PROTOCOL-      INDICATION: Stroke.     TECHNIQUE: 5 mm unenhanced images through the brain.     The radiation dose reduction device was turned on for each scan per the  ALARA (As Low as Reasonably Achievable) protocol.     COMPARISON: None.     FINDINGS: The calvarium appears intact. Included paranasal sinuses and  mastoids appear clear. Soft tissue window images show mild generalized  cerebral atrophy for patient's age. Some mild patchy white  matter  changes are seen in the subcortical white matter of the frontal lobes,  likely chronic. There is no evidence to suggest acute infarction, no  evidence of intracranial hemorrhage, mass or mass effect, hydrocephalus,  or abnormal extraaxial collection.       Impression:       No evidence of acute intracranial disease.     NOTE: Exam time is shown as 11:54 AM. Preliminary report was given to  Dr. Cardenas at 11:57 AM     D:  07/05/2018  E:  07/05/2018               Assessment: Right parietal lobe acute embolic infarct secondary to new onset atrial fibrillation, Post-TPA       Plan:    Patient is an MRI which I think be deferred until tomorrow.    Full stroke workup with  cardiology consult for the atrial fib.    Comment:   Guarded prognosis discussed with the patient's family    I discussed the patients findings and my recommendations with patient, family and nursing staff      Ryland Marie MD  07/05/18  5:13 PM

## 2018-07-05 NOTE — ED PROVIDER NOTES
"Subjective   Ms. Cheyanne Bella is a 89 y.o. female who presents to the ED with stroke sx. Last known well 0830 and ED arrival time 1157.  EMS states family reports speaking to pt at 0830 and when visiting pt's residence at 1030 family noticed left sided weakness, left sided facial droop, and slurred speech. These symptoms are unchanged in ED now. She currently takes plavix and has a past medical hx of HTN, colon cancer, HLD, and bypass surgery (3 years ago). Pt only reports not feeling \"well\" at this time.     TPA initiated at 1210          History provided by:  Patient  Neurologic Problem   The patient's primary symptoms include focal weakness, slurred speech and weakness (left sided). This is a new problem. The current episode started today. The neurological problem developed suddenly. The last time the patient was known to be well was 7/5/2018 8:30 AM.  The problem is unchanged. There was left-sided focality noted.       Review of Systems   Neurological: Positive for focal weakness, facial asymmetry (left sided facial droop), speech difficulty (slurred speech) and weakness (left sided).   All other systems reviewed and are negative.      Past Medical History:   Diagnosis Date   • Colon cancer (CMS/HCC)    • Hyperlipemia    • Hypertension        No Known Allergies    Past Surgical History:   Procedure Laterality Date   • COLON SURGERY     • COLOSTOMY     • CORONARY STENT PLACEMENT     • HERNIA REPAIR     • HYSTERECTOMY     • SKIN CANCER EXCISION         History reviewed. No pertinent family history.    Social History     Social History   • Marital status:      Social History Main Topics   • Smoking status: Never Smoker   • Drug use: Unknown     Other Topics Concern   • Not on file         Objective   Physical Exam   Constitutional: She is oriented to person, place, and time. She appears well-developed and well-nourished. No distress.   Pt is massively obese.    HENT:   Head: Normocephalic and atraumatic. "   Nose: Nose normal.   Airway patent   Eyes: Conjunctivae are normal. No scleral icterus.   Neck: Normal range of motion.   Cardiovascular: Normal rate and normal heart sounds.  An irregular rhythm present. Exam reveals no gallop and no friction rub.    No murmur heard.  Pulmonary/Chest: Effort normal and breath sounds normal. No respiratory distress. She has no wheezes. She has no rales.   Abdominal: Soft. Bowel sounds are normal.   Colostomy in place in LLQ.    Musculoskeletal: Normal range of motion. She exhibits edema (trace pretibial edema).   Neurological: She is alert and oriented to person, place, and time.   Mild weakness in left upper and lower extremities. Mild weakness left face.    Skin: Skin is warm and dry.   Psychiatric: She has a normal mood and affect. Her behavior is normal. Her speech is slurred (dysarthria ).   Nursing note and vitals reviewed.      Critical Care  Performed by: MADELYN HANNON  Authorized by: MADELYN HANNON     Critical care provider statement:     Critical care time (minutes):  35    Critical care time was exclusive of:  Separately billable procedures and treating other patients    Critical care was necessary to treat or prevent imminent or life-threatening deterioration of the following conditions:  CNS failure or compromise    Critical care was time spent personally by me on the following activities:  Evaluation of patient's response to treatment, examination of patient, obtaining history from patient or surrogate, ordering and performing treatments and interventions, ordering and review of laboratory studies, ordering and review of radiographic studies, re-evaluation of patient's condition, development of treatment plan with patient or surrogate, pulse oximetry, discussions with consultants and review of old charts               ED Course  ED Course as of Jul 05 1646   Thu Jul 05, 2018   1235 Dr. Hannon re-examined and updated pt. All questions answered and pt is ready for  admission.   [AT]   1239 She is stable in ED thus far.  Have removed NC O2 and her sats are staying in low 90s.  Still dysarthric, recognizes that she's having a stroke.  No history of atrial fib.  Reports history of heart stent.  May end up in cath lab, awaiting interventionalist opinion.  [LI]   1242 Dr. Parvez Reddy, intensivist   [AT]      ED Course User Index  [AT] Gianfranco Suni Garcia  [LI] Nick Hannon MD     Recent Results (from the past 24 hour(s))   POC Protime / INR    Collection Time: 07/05/18 12:08 PM   Result Value Ref Range    Protime 11.0 (L) 12.8 - 15.2 seconds    INR 0.9 0.8 - 1.2   POC CHEM 8    Collection Time: 07/05/18 12:09 PM   Result Value Ref Range    Glucose 128 70 - 130 mg/dL    BUN 19 8 - 26 mg/dL    Creatinine 1.00 0.60 - 1.30 mg/dL    Sodium 140 138 - 146 mmol/L    Potassium 4.5 3.5 - 4.9 mmol/L    Chloride 100 98 - 109 mmol/L    Total CO2 28 24 - 29 mmol/L    Hemoglobin 11.6 (L) 12.0 - 17.0 g/dL    Hematocrit 34 (L) 38 - 51 %    Ionized Calcium 1.23 1.20 - 1.32 mmol/L   aPTT    Collection Time: 07/05/18 12:15 PM   Result Value Ref Range    PTT 27.2 24.0 - 31.0 seconds   CBC Auto Differential    Collection Time: 07/05/18 12:15 PM   Result Value Ref Range    WBC 5.10 3.50 - 10.80 10*3/mm3    RBC 4.23 3.89 - 5.14 10*6/mm3    Hemoglobin 11.8 11.5 - 15.5 g/dL    Hematocrit 35.5 34.5 - 44.0 %    MCV 83.9 80.0 - 99.0 fL    MCH 27.9 27.0 - 31.0 pg    MCHC 33.2 32.0 - 36.0 g/dL    RDW 13.4 11.3 - 14.5 %    RDW-SD 40.3 37.0 - 54.0 fl    MPV 9.3 6.0 - 12.0 fL    Platelets 145 (L) 150 - 450 10*3/mm3    Neutrophil % 65.6 41.0 - 71.0 %    Lymphocyte % 26.1 24.0 - 44.0 %    Monocyte % 7.1 0.0 - 12.0 %    Eosinophil % 1.0 0.0 - 3.0 %    Basophil % 0.2 0.0 - 1.0 %    Immature Grans % 0.4 0.0 - 0.6 %    Neutrophils, Absolute 3.35 1.50 - 8.30 10*3/mm3    Lymphocytes, Absolute 1.33 0.60 - 4.80 10*3/mm3    Monocytes, Absolute 0.36 0.00 - 1.00 10*3/mm3    Eosinophils, Absolute 0.05 0.00 -  "0.30 10*3/mm3    Basophils, Absolute 0.01 0.00 - 0.20 10*3/mm3    Immature Grans, Absolute 0.02 0.00 - 0.03 10*3/mm3   POC Troponin, Rapid    Collection Time: 07/05/18 12:15 PM   Result Value Ref Range    Troponin I 0.00 0.00 - 0.07 ng/mL     Note: In addition to lab results from this visit, the labs listed above may include labs taken at another facility or during a different encounter within the last 24 hours. Please correlate lab times with ED admission and discharge times for further clarification of the services performed during this visit.    CT Head Without Contrast Stroke Protocol    (Results Pending)   CT Cerebral Perfusion With & Without Contrast    (Results Pending)   CT Angiogram Head With & Without Contrast    (Results Pending)   CT Angiogram Neck With & Without Contrast    (Results Pending)   XR Chest 1 View    (Results Pending)     Vitals:    07/05/18 1206 07/05/18 1228   BP: 173/90 156/77   BP Location: Right arm    Patient Position: Lying    Pulse: 93 89   Resp: 18    Temp: 98.6 °F (37 °C)    TempSrc: Oral    SpO2: 98% 96%   Weight: 109 kg (240 lb)    Height: 162.6 cm (64\")      Medications   sodium chloride 0.9 % flush 10 mL (not administered)   alteplase (ACTIVASE) 81 mg in sterile water (preservative free) 81 mL (1 mg/mL) infusion (81 mg Intravenous New Bag 7/5/18 1216)   sodium chloride 0.9 % infusion 100 mL (not administered)   alteplase (ACTIVASE) 9 mg in sterile water (preservative free) 9 mL bolus (9 mg Intravenous Bolus from Bag 7/5/18 1215)   iopamidol (ISOVUE-370) 76 % injection 150 mL (115 mL Intravenous Given 7/5/18 1216)     ECG/EMG Results (last 24 hours)     ** No results found for the last 24 hours. **                        MDM  Number of Diagnoses or Management Options  Acute CVA (cerebrovascular accident) (CMS/HCC):   Atrial fibrillation, unspecified type (CMS/HCC):   Received intravenous tissue plasminogen activator (t-PA) in emergency department:   Critical Care  Total time " providing critical care: 30-74 minutes      Final diagnoses:   Acute CVA (cerebrovascular accident) (CMS/HCC)   Atrial fibrillation, unspecified type (CMS/HCC)   Received intravenous tissue plasminogen activator (t-PA) in emergency department       Documentation assistance provided by alfredo Garcia.  Information recorded by the scribe was done at my direction and has been verified and validated by me.     Gianfranco Garcia  07/05/18 1211       Gianfranco Garcia  07/05/18 1233       Gianfranco Garcia  07/05/18 1241       Nick Hannon MD  07/05/18 1900

## 2018-07-05 NOTE — THERAPY EVALUATION
Acute Care - Speech Language Pathology   Swallow Initial Evaluation [facility]   Clinical Swallow Evaluation     Patient Name: Cheyanne Bella  : 1926  MRN: [id number]  Today's Date: 2018               Admit Date: 2018    Visit Dx:     ICD-10-CM ICD-9-CM   1. Acute CVA (cerebrovascular accident) (CMS/HCC) I63.9 434.91   2. Atrial fibrillation, unspecified type (CMS/HCC) I48.91 427.31   3. Received intravenous tissue plasminogen activator (t-PA) in emergency department Z92.82 V45.88   4. Dysphagia, unspecified type R13.10 787.20     Patient Active Problem List   Diagnosis   • Basal cell carcinoma   • Acute CVA (cerebrovascular accident) (CMS/HCC)   • Hypertension   • Hyperlipemia   • Coronary artery disease   • Paroxysmal atrial fibrillation (CMS/HCC)     Past Medical History:   Diagnosis Date   • Colon cancer (CMS/HCC)    • Coronary artery disease    • Hyperlipemia    • Hypertension      Past Surgical History:   Procedure Laterality Date   • COLON SURGERY     • COLOSTOMY     • CORONARY STENT PLACEMENT     • HERNIA REPAIR     • HYSTERECTOMY     • SKIN CANCER EXCISION            SWALLOW EVALUATION (last 72 hours)      SLP Adult Swallow Evaluation     Row Name 18 1430                   Rehab Evaluation    Subjective Information no complaints  -RD        Patient Observations alert;cooperative;agree to therapy  -RD        Patient/Family Observations family present  -RD        Patient Effort good  -RD           General Information    Patient Profile Reviewed yes  -RD        Pertinent History Of Current Problem Adm w/ Acute CVA, s/p TPA. L droop and wkns. Failed RN dysphagia screen. Per stroke protocol.   -RD        Current Method of Nutrition NPO  -RD        Precautions/Limitations, Vision WFL with corrective lenses  -RD        Precautions/Limitations, Hearing WFL;for purposes of eval  -RD        Prior Level of Function-Communication other (see comments)   unknown  -RD        Prior Level of  Function-Swallowing no diet consistency restrictions  -RD        Plans/Goals Discussed with patient;family;agreed upon  -RD        Barriers to Rehab none identified  -RD        Patient's Goals for Discharge eat/drink without coughing/choking  -RD        Family Goals for Discharge patient able to eat/drink without coughing/choking  -RD           Pain Assessment    Additional Documentation Pain Scale: Numbers Pre/Post-Treatment (Group)  -RD           Pain Scale: Numbers Pre/Post-Treatment    Pain Scale: Numbers, Pretreatment 0/10 - no pain  -RD        Pain Scale: Numbers, Post-Treatment 0/10 - no pain  -RD           Oral Motor and Function    Dentition Assessment edentulous, dentures not available  -RD        Secretion Management WNL/WFL  -RD        Mucosal Quality moist, healthy  -RD        Volitional Swallow weak  -RD        Volitional Cough weak  -RD           Oral Musculature and Cranial Nerve Assessment    Oral Motor General Assessment generalized oral motor weakness;oral labial or buccal impairment;lingual impairment  -RD        Oral Labial or Buccal Impairment, Detail, Cranial Nerve VII (Facial): left labial droop;reduced strength bilaterally  -RD        Lingual Impairment, Detail. Cranial Nerves IX, XII (Glossopharyngeal and Hypoglossal) reduced strength left;reduced lingual ROM  -RD           General Eating/Swallowing Observations    Eating/Swallowing Skills fed by SLP  -RD        Positioning During Eating upright 90 degree;upright in bed  -RD        Utensils Used spoon;cup;straw  -RD        Consistencies Trialed thin liquids;pudding thick;regular textures  -RD           Clinical Swallow Eval    Oral Prep Phase impaired  -RD        Oral Transit impaired  -RD        Oral Residue impaired  -RD        Pharyngeal Phase suspected pharyngeal impairment  -RD        Clinical Swallow Evaluation Summary BS eval complete. L labial droop and lingual deviation. Saw this PM per stroke protocol. Pt administered TPA in the  ED. Overt s/s of aspiration c/b inconsistent wet vocal quality w/ thins and pureed. Unable to adequately masticate solid and had to spit out. Not safe for PO at this time. F/u tomorrow for BS re-eval. Safest NPO, meds alt route.   -RD           Oral Prep Concerns    Oral Prep Concerns anterior loss;inefficient mastication  -RD        Inefficient Mastication regular consistencies  -RD        Anterior Loss thin  -RD           Oral Transit Concerns    Oral Transit Concerns increased oral transit time  -RD        Increased Oral Transit Time all consistencies  -RD           Oral Residue Concerns    Oral Residue Concerns lateral sulcus residue, left  -RD        Lateral Sulcus Residue, Left regular consistencies  -RD           Pharyngeal Phase Concerns    Pharyngeal Phase Concerns wet vocal quality  -RD        Wet Vocal Quality thin;pudding;other (see comments)   inconsistently  -RD           Clinical Impression    SLP Swallowing Diagnosis suspected pharyngeal dysfunction;oral dysfunction  -RD        Functional Impact risk of aspiration/pneumonia;risk of dehydration;risk of malnutrition  -RD        Rehab Potential/Prognosis, Swallowing good, to achieve stated therapy goals  -RD        Criteria for Skilled Therapeutic Interventions Met demonstrates skilled criteria  -RD           Recommendations    Therapy Frequency (Swallow) evaluation only;PRN  -RD        Predicted Duration Therapy Intervention (Days) until discharge  -RD        SLP Diet Recommendation NPO  -RD        Recommended Diagnostics reassess via clinical swallow evaluation  -RD        SLP Rec. for Method of Medication Administration meds via alternate route  -RD        Anticipated Dischage Disposition unknown;anticipate therapy at next level of care  -RD          User Key  (r) = Recorded By, (t) = Taken By, (c) = Cosigned By    Initials Name Effective Dates    KATE Borrero, MS CCC-SLP 04/03/18 -         EDUCATION  The patient has been educated in the  following areas:   Dysphagia (Swallowing Impairment) Oral Care/Hydration NPO rationale.    SLP Recommendation and Plan  SLP Swallowing Diagnosis: suspected pharyngeal dysfunction, oral dysfunction  SLP Diet Recommendation: NPO           Recommended Diagnostics: reassess via clinical swallow evaluation  Criteria for Skilled Therapeutic Interventions Met: demonstrates skilled criteria  Anticipated Dischage Disposition: unknown, anticipate therapy at next level of care  Rehab Potential/Prognosis, Swallowing: good, to achieve stated therapy goals  Therapy Frequency (Swallow): evaluation only, PRN  Predicted Duration Therapy Intervention (Days): until discharge       Plan of Care Reviewed With: patient, family  Plan of Care Review  Plan of Care Reviewed With: patient, family             Time Calculation:         Time Calculation- SLP     Row Name 07/05/18 1637             Time Calculation- SLP    SLP Start Time 1430  -RD      SLP Received On 07/05/18  -RD        User Key  (r) = Recorded By, (t) = Taken By, (c) = Cosigned By    Initials Name Provider Type    RD Sylvia Borrero MS CCC-SLP Speech and Language Pathologist          Therapy Charges for Today     Code Description Service Date Service Provider Modifiers Qty    [id number] HC ST EVAL ORAL PHARYNG SWALLOW 4 7/5/2018 Sylvia Borrero MS CCC-SLP GN 1               Sylvia Borrero MS CCC-SLP  7/5/2018

## 2018-07-05 NOTE — PLAN OF CARE
Problem: Patient Care Overview  Goal: Plan of Care Review  Outcome: Ongoing (interventions implemented as appropriate)   07/05/18 1430   Coping/Psychosocial   Plan of Care Reviewed With patient;family   SLP clinical dysphagia evaluation completed. Will address suspected pharyngeal dysphagia. Inconsistent wet vocal quality w/ thins and pureed. Not safe for PO at this time. F/u tomorrow AM for BS re-eval to see if improved. Safest NPO, meds alt route, Oral care BID and PRN. Please see note for further details and recommendations.

## 2018-07-06 ENCOUNTER — APPOINTMENT (OUTPATIENT)
Dept: CT IMAGING | Facility: HOSPITAL | Age: 83
End: 2018-07-06

## 2018-07-06 ENCOUNTER — ANCILLARY PROCEDURE (OUTPATIENT)
Dept: SPEECH THERAPY | Facility: HOSPITAL | Age: 83
End: 2018-07-06
Attending: INTERNAL MEDICINE

## 2018-07-06 ENCOUNTER — APPOINTMENT (OUTPATIENT)
Dept: MRI IMAGING | Facility: HOSPITAL | Age: 83
End: 2018-07-06

## 2018-07-06 LAB
ALBUMIN SERPL-MCNC: 4.05 G/DL (ref 3.2–4.8)
ALBUMIN/GLOB SERPL: 1.7 G/DL (ref 1.5–2.5)
ALP SERPL-CCNC: 74 U/L (ref 25–100)
ALT SERPL W P-5'-P-CCNC: 13 U/L (ref 7–40)
ANION GAP SERPL CALCULATED.3IONS-SCNC: 11 MMOL/L (ref 3–11)
ARTICHOKE IGE QN: 90 MG/DL (ref 0–130)
AST SERPL-CCNC: 22 U/L (ref 0–33)
BILIRUB SERPL-MCNC: 0.9 MG/DL (ref 0.3–1.2)
BUN BLD-MCNC: 15 MG/DL (ref 9–23)
BUN/CREAT SERPL: 16 (ref 7–25)
CALCIUM SPEC-SCNC: 9 MG/DL (ref 8.7–10.4)
CHLORIDE SERPL-SCNC: 101 MMOL/L (ref 99–109)
CHOLEST SERPL-MCNC: 164 MG/DL (ref 0–200)
CO2 SERPL-SCNC: 27 MMOL/L (ref 20–31)
CREAT BLD-MCNC: 0.94 MG/DL (ref 0.6–1.3)
DEPRECATED RDW RBC AUTO: 42.2 FL (ref 37–54)
ERYTHROCYTE [DISTWIDTH] IN BLOOD BY AUTOMATED COUNT: 13.5 % (ref 11.3–14.5)
GFR SERPL CREATININE-BSD FRML MDRD: 56 ML/MIN/1.73
GFR SERPL CREATININE-BSD FRML MDRD: 68 ML/MIN/1.73
GLOBULIN UR ELPH-MCNC: 2.5 GM/DL
GLUCOSE BLD-MCNC: 136 MG/DL (ref 70–100)
GLUCOSE BLDC GLUCOMTR-MCNC: 119 MG/DL (ref 70–130)
GLUCOSE BLDC GLUCOMTR-MCNC: 144 MG/DL (ref 70–130)
GLUCOSE BLDC GLUCOMTR-MCNC: 152 MG/DL (ref 70–130)
HBA1C MFR BLD: 5.6 % (ref 4.8–5.6)
HCT VFR BLD AUTO: 38.4 % (ref 34.5–44)
HDLC SERPL-MCNC: 49 MG/DL (ref 40–60)
HGB BLD-MCNC: 12.9 G/DL (ref 11.5–15.5)
INR PPP: 1.01 (ref 0.91–1.09)
MCH RBC QN AUTO: 28.9 PG (ref 27–31)
MCHC RBC AUTO-ENTMCNC: 33.6 G/DL (ref 32–36)
MCV RBC AUTO: 85.9 FL (ref 80–99)
PLATELET # BLD AUTO: 151 10*3/MM3 (ref 150–450)
PMV BLD AUTO: 9.1 FL (ref 6–12)
POTASSIUM BLD-SCNC: 4.4 MMOL/L (ref 3.5–5.5)
PROT SERPL-MCNC: 6.5 G/DL (ref 5.7–8.2)
PROTHROMBIN TIME: 10.6 SECONDS (ref 9.6–11.5)
RBC # BLD AUTO: 4.47 10*6/MM3 (ref 3.89–5.14)
SODIUM BLD-SCNC: 139 MMOL/L (ref 132–146)
TRIGL SERPL-MCNC: 131 MG/DL (ref 0–150)
WBC NRBC COR # BLD: 8.95 10*3/MM3 (ref 3.5–10.8)

## 2018-07-06 PROCEDURE — 97530 THERAPEUTIC ACTIVITIES: CPT

## 2018-07-06 PROCEDURE — 97162 PT EVAL MOD COMPLEX 30 MIN: CPT

## 2018-07-06 PROCEDURE — 97165 OT EVAL LOW COMPLEX 30 MIN: CPT

## 2018-07-06 PROCEDURE — 80053 COMPREHEN METABOLIC PANEL: CPT | Performed by: INTERNAL MEDICINE

## 2018-07-06 PROCEDURE — 99233 SBSQ HOSP IP/OBS HIGH 50: CPT | Performed by: INTERNAL MEDICINE

## 2018-07-06 PROCEDURE — 92612 ENDOSCOPY SWALLOW (FEES) VID: CPT

## 2018-07-06 PROCEDURE — 99221 1ST HOSP IP/OBS SF/LOW 40: CPT | Performed by: INTERNAL MEDICINE

## 2018-07-06 PROCEDURE — 99232 SBSQ HOSP IP/OBS MODERATE 35: CPT | Performed by: PSYCHIATRY & NEUROLOGY

## 2018-07-06 PROCEDURE — 82962 GLUCOSE BLOOD TEST: CPT

## 2018-07-06 PROCEDURE — 80061 LIPID PANEL: CPT | Performed by: INTERNAL MEDICINE

## 2018-07-06 PROCEDURE — 70450 CT HEAD/BRAIN W/O DYE: CPT

## 2018-07-06 PROCEDURE — 83036 HEMOGLOBIN GLYCOSYLATED A1C: CPT | Performed by: INTERNAL MEDICINE

## 2018-07-06 PROCEDURE — 92610 EVALUATE SWALLOWING FUNCTION: CPT

## 2018-07-06 PROCEDURE — 85027 COMPLETE CBC AUTOMATED: CPT | Performed by: INTERNAL MEDICINE

## 2018-07-06 PROCEDURE — 85610 PROTHROMBIN TIME: CPT | Performed by: INTERNAL MEDICINE

## 2018-07-06 PROCEDURE — 92523 SPEECH SOUND LANG COMPREHEN: CPT

## 2018-07-06 PROCEDURE — 70551 MRI BRAIN STEM W/O DYE: CPT

## 2018-07-06 RX ORDER — ASPIRIN 300 MG/1
300 SUPPOSITORY RECTAL DAILY
Status: DISCONTINUED | OUTPATIENT
Start: 2018-07-06 | End: 2018-07-09 | Stop reason: HOSPADM

## 2018-07-06 RX ORDER — ASPIRIN 325 MG
325 TABLET ORAL DAILY
Status: DISCONTINUED | OUTPATIENT
Start: 2018-07-06 | End: 2018-07-09 | Stop reason: HOSPADM

## 2018-07-06 RX ADMIN — ASPIRIN 325 MG ORAL TABLET 325 MG: 325 PILL ORAL at 18:07

## 2018-07-06 RX ADMIN — HYDROCODONE BITARTRATE AND ACETAMINOPHEN 1 TABLET: 7.5; 325 TABLET ORAL at 20:39

## 2018-07-06 RX ADMIN — ATORVASTATIN CALCIUM 80 MG: 40 TABLET, FILM COATED ORAL at 20:39

## 2018-07-06 RX ADMIN — HYDROCODONE BITARTRATE AND ACETAMINOPHEN 1 TABLET: 7.5; 325 TABLET ORAL at 11:10

## 2018-07-06 RX ADMIN — FUROSEMIDE 40 MG: 40 TABLET ORAL at 11:09

## 2018-07-06 RX ADMIN — LOSARTAN POTASSIUM 50 MG: 50 TABLET ORAL at 11:10

## 2018-07-06 NOTE — PLAN OF CARE
Problem: Stroke (Ischemic) (Adult)  Goal: Signs and Symptoms of Listed Potential Problems Will be Absent, Minimized or Managed (Stroke)  Outcome: Ongoing (interventions implemented as appropriate)   07/06/18 0845   Goal/Outcome Evaluation   Problems Assessed (Stroke (Ischemic)) cognitive impairment;communication impairment;motor/sensory impairment   Problems Assessed (Stroke (Ischemic)) communication impairment;motor/sensory impairment       Problem: Patient Care Overview  Goal: Plan of Care Review  Outcome: Ongoing (interventions implemented as appropriate)   07/06/18 0845   Coping/Psychosocial   Plan of Care Reviewed With patient   OTHER   Outcome Summary PT evaluation completed. Pt demonstrates LLE weakness, balance deficits, and decreased indep re: functional mobility with evolving signs/symptoms, warranting further skilled PT services to promote PLOF. Limited today by fatigue. Recommend IP rehab at d/c.

## 2018-07-06 NOTE — MBS/VFSS/FEES
Acute Care - Speech Language Pathology Initial Evaluation  [facility]   Cognitive-Communication Evaluation  Clinical Swallow Re-Evaluation +  Fiberoptic Endoscopic Evaluation of Swallowing (FEES)     Patient Name: Cheyanne Bella  : 1926  MRN: [id number]  Today's Date: 2018  Onset of Illness/Injury or Date of Surgery: 18     Referring Physician: DO Cortez      Admit Date: 2018     Visit Dx:    ICD-10-CM ICD-9-CM   1. Acute CVA (cerebrovascular accident) (CMS/HCC) I63.9 434.91   2. Atrial fibrillation, unspecified type (CMS/HCC) I48.91 427.31   3. Received intravenous tissue plasminogen activator (t-PA) in emergency department Z92.82 V45.88   4. Dysphagia, unspecified type R13.10 787.20   5. Impaired mobility and ADLs Z74.09 799.89   6. Impaired functional mobility, balance, gait, and endurance Z74.09 V49.89     Patient Active Problem List   Diagnosis   • Basal cell carcinoma   • Acute CVA (cerebrovascular accident) (CMS/HCC)   • Hypertension   • Hyperlipemia   • Coronary artery disease   • Paroxysmal atrial fibrillation (CMS/HCC)     Past Medical History:   Diagnosis Date   • Colon cancer (CMS/HCC)    • Coronary artery disease    • Hyperlipemia    • Hypertension      Past Surgical History:   Procedure Laterality Date   • COLON SURGERY     • COLOSTOMY     • CORONARY STENT PLACEMENT     • HERNIA REPAIR     • HYSTERECTOMY     • SKIN CANCER EXCISION            SLP EVALUATION (last 72 hours)      SLP SLC Evaluation     Row Name 18 1000                   Comprehension Assessment/Intervention    Comprehension Assessment/Intervention Auditory Comprehension   DNT reading/writing 2' fatigue and pain  -SM           Auditory Comprehension Assessment/Intervention    Answers Questions (Communication) yes/no;wh questions;personal;simple;WFL  -SM        Able to Follow Commands (Communication) 1-step;2-step;WFL  -SM        Narrative Discourse conversational level;WFL  -SM           Expression  Assessment/Intervention    Expression Assessment/Intervention verbal expression  -           Verbal Expression Assessment/Intervention    Conversational Discourse/Fluency WFL  -           Motor Speech Assessment/Intervention    Motor Speech Function moderate impairment  -        Motor Speech, Comment Flaccid dysarthria with imprecise articulation and low volume  -           Cognitive Assessment Intervention- SLP    Orientation Status (Cognition) [facility]  -        Attention (Cognitive) other (see comments)   some fixation on personal needs, though likely appropriate  -        Cognition, Comment Did not fully test 2' fatigue/pain. No obvious difficulties though will dx tx next session. Pt was fixated on needing sleep and in pain - though this very much could simply be functional to the situation.   -           SLP Clinical Impressions    SLP Diagnosis Moderate dysarthria, will cont dx tx  -SM        Rehab Potential/Prognosis good  -        Criteria for Skilled Therapy Interventions Met yes  -        Functional Impact difficulty in expressing complex messages  -           Recommendations    Therapy Frequency (SLP SLC) 5 days per week  -        Anticipated Dischage Disposition anticipate therapy at next level of care  -           Communication Treatment Objective and Progress Goals (SLP)    Motor Speech/Dysarthria Treatment Objectives Motor Speech/Dysarthria Treatment Objectives (Group)  -        Additional Goals Additional Goals (Group)  -          User Key  (r) = Recorded By, (t) = Taken By, (c) = Cosigned By    Initials Name Effective Dates     Tori Pryor MS CCC-SLP 06/22/15 -                EDUCATION  The patient has been educated in the following areas:     Cognitive Impairment Communication Impairment.    SLP Recommendation and Plan    SLP Diagnosis: Moderate dysarthria, will cont dx tx    Rehab Potential/Prognosis: good    Criteria for Skilled Therapeutic Interventions Met:  demonstrates skilled criteria  Criteria for Skilled Therapy Interventions Met: yes    Anticipated Dischage Disposition: anticipate therapy at next level of care         Therapy Frequency (Swallow): 5 days per week    Predicted Duration Therapy Intervention (Days): until discharge          Plan of Care Review  Plan of Care Reviewed With: patient, family  Plan of Care Reviewed With: patient, family              SLP GOALS     Row Name 07/06/18 1000             Oral Nutrition/Hydration Goal 1 (SLP)    Oral Nutrition/Hydration Goal 1, SLP LTG: Will tolerate soft solids and nectar-thick liquids without s/s aspiration with 100% accuracy and no cues  -SM      Time Frame (Oral Nutrition/Hydration Goal 1, SLP) by discharge  -SM         Oral Nutrition/Hydration Goal 2 (SLP)    Oral Nutrition/Hydration Goal 2, SLP Tolerate trials of puree with some soft trials without difficulty with 100% accuracy and no cues  -SM      Time Frame (Oral Nutrition/Hydration Goal 2, SLP) short term goal (STG);by discharge  -SM         Labial Strengthening Goal 1 (SLP)    Activity (Labial Strengthening Goal 1, SLP) increase labial tone  -SM      Increase Labial Tone labial resistance exercises  -SM      [place]/Accuracy (Labial Strengthening Goal 1, SLP) independently (over 90% accuracy)  -SM      Time Frame (Labial Strengthening Goal 1, SLP) short term goal (STG);by discharge  -SM         Lingual Strengthening Goal 1 (SLP)    Activity (Lingual Strengthening Goal 1, SLP) increase lingual tone/sensation/control/coordination/movement  -SM      Increase Lingual Tone/Sensation/Control/Coordination/Movement lingual resistance exercises  -SM      [place]/Accuracy (Lingual Strengthening Goal 1, SLP) independently (over 90% accuracy)  -SM      Time Frame (Lingual Strengthening Goal 1, SLP) short term goal (STG);by discharge  -SM         Pharyngeal Strengthening Exercise Goal 1 (SLP)    Activity (Pharyngeal Strengthening Goal 1, SLP) increase  anterior movement of the hyolaryngeal complex;increase squeeze/positive pressure generation  -SM      Increase Anterior Movement of the Hyolaryngeal Complex chin tuck against resistance (CTAR)  -SM      Increase Squeeze/Positive Pressure Generation hard effortful swallow  -SM      [place]/Accuracy (Pharyngeal Strengthening Goal 1, SLP) independently (over 90% accuracy)  -SM      Time Frame (Pharyngeal Strengthening Goal 1, SLP) short term goal (STG);by discharge  -         Pharyngeal Strengthening Exercise Goal 2 (SLP)    Activity (Pharyngeal Strengthening Goal 1, SLP) increase timing;increase superior movement of the hyolaryngeal complex  -SM      Increase Timing prepping - 3 second prep or suck swallow or 3-step swallow  -SM      Increase Superior Movement of the Hyolaryngeal Complex super-supraglottic swallow  -SM      [place]/Accuracy (Pharyngeal Strengthening Goal 1, SLP) independently (over 90% accuracy)  -SM      Time Frame (Pharyngeal Strengthening Goal 2, SLP) short term goal (STG);by discharge  -         Swallow Management Recall Goal 1 (SLP)    Activity (Swallow Management Recall Goal 1, SLP) independent recall of;recall of;safe diet/liquid level;compensatory swallow strategies/techniques;rationale for use of strategies/techniques  -SM      [place]/Accuracy (Swallow Management Recall Goal 1, SLP) independently (over 90% accuracy)  -SM      Time Frame (Swallow Management Recall Goal 1, SLP) short term goal (STG);by discharge  -         Phonation Goal 1 (SLP)    Improve Phonation By Goal 1 (SLP) using loud speech;90%;with minimal cues (75-90%)  -      Time Frame (Phonation Goal 1, SLP) short term goal (STG);by discharge  -         Articulation Goal 1 (SLP)    Improve Articulation Goal 1 (SLP) of specific sounds in words;of specific sounds in phrases;90%;with minimal cues (75-90%)  -      Time Frame (Articulation Goal 1, SLP) short term goal (STG);by discharge  -          Additional Goal 1 (SLP)    Additional Goal 1, SLP LTG: Improve cog-comm skills in order to participate in care while in hospital setting with 90% accuracy and min cues.   -      Time Frame (Additional Goal 1, SLP) by discharge  -        User Key  (r) = Recorded By, (t) = Taken By, (c) = Cosigned By    Initials Name Provider Type    NAV Pryor MS CCC-SLP Speech and Language Pathologist                      Time Calculation:           Time Calculation- SLP     Row Name 18 1221             Time Calculation- SLP    SLP Start Time 1000  -      SLP Received On 18  -        User Key  (r) = Recorded By, (t) = Taken By, (c) = Cosigned By    Initials Name Provider Type    NAV Pryor MS CCC-SLP Speech and Language Pathologist          Therapy Charges for Today     Code Description Service Date Service Provider Modifiers Qty    33224536753 HC ST EVAL ORAL PHARYNG SWALLOW 2 2018 Tori Pryor MS CCC-SLP GN 1    83517506613 HC ST EVAL SPEECH AND PROD W LANG  2 2018 Tori Pryor MS CCC-SLP GN 1    [id number] HC ST FIBEROPTIC ENDO EVAL SWALL 5 2018 Tori Pryor MS CCC-SLP GN 1                     Tori Pryor MS CCC-SLP  2018 and Acute Care - Speech Language Pathology   Swallow Re-Evaluation [facility]     Patient Name: Cheyanne Bella  : 1926  MRN: [id number]  Today's Date: 2018  Onset of Illness/Injury or Date of Surgery: 18     Referring Physician: DO Cortez      Admit Date: 2018    Visit Dx:     ICD-10-CM ICD-9-CM   1. Acute CVA (cerebrovascular accident) (CMS/HCC) I63.9 434.91   2. Atrial fibrillation, unspecified type (CMS/HCC) I48.91 427.31   3. Received intravenous tissue plasminogen activator (t-PA) in emergency department Z92.82 V45.88   4. Dysphagia, unspecified type R13.10 787.20   5. Impaired mobility and ADLs Z74.09 799.89   6. Impaired functional mobility, balance, gait, and endurance Z74.09 V49.89      Patient Active Problem List   Diagnosis   • Basal cell carcinoma   • Acute CVA (cerebrovascular accident) (CMS/HCC)   • Hypertension   • Hyperlipemia   • Coronary artery disease   • Paroxysmal atrial fibrillation (CMS/HCC)     Past Medical History:   Diagnosis Date   • Colon cancer (CMS/HCC)    • Coronary artery disease    • Hyperlipemia    • Hypertension      Past Surgical History:   Procedure Laterality Date   • COLON SURGERY     • COLOSTOMY     • CORONARY STENT PLACEMENT     • HERNIA REPAIR     • HYSTERECTOMY     • SKIN CANCER EXCISION            SWALLOW EVALUATION (last 72 hours)      SLP Adult Swallow Evaluation     Row Name 07/06/18 1000 07/05/18 1430                Rehab Evaluation    Document Type re-evaluation;evaluation  -SM  --       Subjective Information complains of;fatigue;pain  -SM no complaints  -RD       Patient Observations alert;cooperative  -SM alert;cooperative;agree to therapy  -RD       Patient/Family Observations  -- family present  -RD       Patient Effort  -- good  -RD          General Information    Patient Profile Reviewed  -- yes  -RD       Pertinent History Of Current Problem Large R MCA infarct (parietal)  -SM Adm w/ Acute CVA, s/p TPA. L droop and wkns. Failed RN dysphagia screen. Per stroke protocol.   -RD       Current Method of Nutrition NPO  -SM NPO  -RD       Precautions/Limitations, Vision  -- WFL with corrective lenses  -RD       Precautions/Limitations, Hearing  -- WFL;for purposes of eval  -RD       Prior Level of Function-Communication  -- other (see comments)   unknown  -RD       Prior Level of Function-Swallowing  -- no diet consistency restrictions  -RD       Plans/Goals Discussed with patient and family;agreed upon   family arrived at end of study  -SM patient;family;agreed upon  -RD       Barriers to Rehab none identified  -SM none identified  -RD       Patient's Goals for Discharge return to PO diet  -SM eat/drink without coughing/choking  -RD       Family Goals  for Discharge patient able to return to PO diet  -SM patient able to eat/drink without coughing/choking  -RD          Pain Assessment    Additional Documentation  -- Pain Scale: Numbers Pre/Post-Treatment (Group)  -RD          Pain Scale: Numbers Pre/Post-Treatment    Pain Scale: Numbers, Pretreatment  -- 0/10 - no pain  -RD       Pain Scale: Numbers, Post-Treatment  -- 0/10 - no pain  -RD          Pain Scale: FACES Pre/Post-Treatment    Pain: FACES Scale, Pretreatment 4-->hurts little more  -SM  --       Pain: FACES Scale, Post-Treatment 4-->hurts little more  -SM  --       Pre/Post Treatment Pain Comment hip pain, RN to administer pain meds  -SM  --          Oral Motor and Function    Dentition Assessment  -- edentulous, dentures not available  -RD       Secretion Management  -- WNL/WFL  -RD       Mucosal Quality  -- moist, healthy  -RD       Volitional Swallow  -- weak  -RD       Volitional Cough  -- weak  -RD          Oral Musculature and Cranial Nerve Assessment    Oral Motor General Assessment  -- generalized oral motor weakness;oral labial or buccal impairment;lingual impairment  -RD       Oral Labial or Buccal Impairment, Detail, Cranial Nerve VII (Facial): left labial droop  -SM left labial droop;reduced strength bilaterally  -RD       Lingual Impairment, Detail. Cranial Nerves IX, XII (Glossopharyngeal and Hypoglossal) reduced strength left;reduced lingual ROM  -SM reduced strength left;reduced lingual ROM  -RD          General Eating/Swallowing Observations    Eating/Swallowing Skills  -- fed by SLP  -RD       Positioning During Eating  -- upright 90 degree;upright in bed  -RD       Utensils Used  -- spoon;cup;straw  -RD       Consistencies Trialed  -- thin liquids;pudding thick;regular textures  -RD          Clinical Swallow Eval    Oral Prep Phase  -- impaired  -RD       Oral Transit  -- impaired  -RD       Oral Residue  -- impaired  -RD       Pharyngeal Phase  -- suspected pharyngeal impairment  -RD        Clinical Swallow Evaluation Summary  -- BS eval complete. L labial droop and lingual deviation. Saw this PM per stroke protocol. Pt administered TPA in the ED. Overt s/s of aspiration c/b inconsistent wet vocal quality w/ thins and pureed. Unable to adequately masticate solid and had to spit out. Not safe for PO at this time. F/u tomorrow for BS re-eval. Safest NPO, meds alt route.   -RD          Oral Prep Concerns    Oral Prep Concerns anterior loss;other (see comments)   DNT solids  -SM anterior loss;inefficient mastication  -RD       Inefficient Mastication  -- regular consistencies  -RD       Anterior Loss thin  -SM thin  -RD          Oral Transit Concerns    Oral Transit Concerns  -- increased oral transit time  -RD       Increased Oral Transit Time  -- all consistencies  -RD          Oral Residue Concerns    Oral Residue Concerns  -- lateral sulcus residue, left  -RD       Lateral Sulcus Residue, Left  -- regular consistencies  -RD          Pharyngeal Phase Concerns    Pharyngeal Phase Concerns cough  -SM wet vocal quality  -RD       Wet Vocal Quality  -- thin;pudding;other (see comments)   inconsistently  -RD       Cough thin  -SM  --          Fiberoptic Endoscopic Evaluation of Swallowing (FEES)    Risks/Benefits Reviewed risks/benefits explained;patient;agreed to eval  -SM  --       Nasal Entry right:  -SM  --          Anatomy and Physiology    Velopharyngeal WFL  -SM  --       Base of Tongue symmetrical;range adequate  -SM  --       Epiglottis WFL  -SM  --       Laryngeal Function Breathing symmetrical  -SM  --       Laryngeal Function Phonation symmetrical  -SM  --       Laryngeal Function to Breath Hold TVT/FVF/Arytenoid;sustained closure  -SM  --       Secretion Rating Scale (Oswaldo et al. 1996) 1- secretions present around the laryngeal vestibule  -SM  --       Secretion Description thin;clear  -SM  --       Ice Chips elicited swallow;not aspirated;fully cleared secretions  -SM  --       Spontaneous  Swallow frequency adequate  -SM  --       Sensory sensed scope  -SM  --       Consistencies Trialed thin liquids;nectar-thick liquids;pudding/puree;Regular textures  -SM  --          FEES Interpretation    Oral Phase anterior loss;reduced lingual control;inadequate manipulation;increased A-P transit time;other (see comments)   could not manipulate solid bolus, removed from mouth  -SM  --          Initiation of Pharyngeal Swallow    Initiation of Pharyngeal Swallow bolus in pyriform sinuses  -SM  --       Pharyngeal Phase impaired pharyngeal phase of swallowing  -SM  --       Aspiration After the Swallow thin liquids;in pyriform sinuses;other (see comments)   did not push size, likely asp during risk 2' control/timing  -SM  --       Response to Aspiration cough;reflexive response;cleared subglottic material  -SM  --       Rosenbek's Scale thin:;6-->Level 6  -SM  --       Residue diffuse within pharynx;pyriform sinuses;all consistencies tested;other (see comments)   greatest in pyriforms, mild diffuse otherwise  -SM  --       Response to Residue cleared residue;with spontaneous subsequent swallow;other (see comments)   timely with nectar and pudding, less sensitive with thins  -SM  --          Clinical Impression    SLP Swallowing Diagnosis mod-severe;oral dysfunction;moderate;pharyngeal dysfunction  -SM suspected pharyngeal dysfunction;oral dysfunction  -RD       Functional Impact risk of aspiration/pneumonia  -SM risk of aspiration/pneumonia;risk of dehydration;risk of malnutrition  -RD       Rehab Potential/Prognosis, Swallowing good, to achieve stated therapy goals  -SM good, to achieve stated therapy goals  -RD       Criteria for Skilled Therapeutic Interventions Met demonstrates skilled criteria  -SM demonstrates skilled criteria  -RD          Recommendations    Therapy Frequency (Swallow) 5 days per week  -SM evaluation only;PRN  -RD       Predicted Duration Therapy Intervention (Days) until discharge  -  until discharge  -RD       SLP Diet Recommendation puree;nectar thick liquids;ice chips between meals after oral care, with supervision  - NPO  -RD       Recommended Diagnostics  -- reassess via clinical swallow evaluation  -RD       Recommended Precautions and Strategies upright posture during/after eating;small bites of food and sips of liquid  -  --       SLP Rec. for Method of Medication Administration meds whole;meds crushed;with pudding or applesauce  - meds via alternate route  -RD       Anticipated Dischage Disposition  -- unknown;anticipate therapy at next level of care  -RD         User Key  (r) = Recorded By, (t) = Taken By, (c) = Cosigned By    Initials Name Effective Dates     Tori Pryor, MS CCC-SLP 06/22/15 -     KATE Borrero, MS CCC-SLP 04/03/18 -         EDUCATION  The patient has been educated in the following areas:   Dysphagia (Swallowing Impairment) Oral Care/Hydration Modified Diet Instruction.    SLP Recommendation and Plan  SLP Swallowing Diagnosis: mod-severe, oral dysfunction, moderate, pharyngeal dysfunction  SLP Diet Recommendation: puree, nectar thick liquids, ice chips between meals after oral care, with supervision  Recommended Precautions and Strategies: upright posture during/after eating, small bites of food and sips of liquid           Criteria for Skilled Therapeutic Interventions Met: demonstrates skilled criteria  Anticipated Dischage Disposition: anticipate therapy at next level of care  Rehab Potential/Prognosis, Swallowing: good, to achieve stated therapy goals  Therapy Frequency (Swallow): 5 days per week  Predicted Duration Therapy Intervention (Days): until discharge       Plan of Care Reviewed With: patient, family  Plan of Care Review  Plan of Care Reviewed With: patient, family          SLP GOALS     Row Name 07/06/18 1000             Oral Nutrition/Hydration Goal 1 (SLP)    Oral Nutrition/Hydration Goal 1, SLP LTG: Will tolerate soft solids and  nectar-thick liquids without s/s aspiration with 100% accuracy and no cues  -SM      Time Frame (Oral Nutrition/Hydration Goal 1, SLP) by discharge  -SM         Oral Nutrition/Hydration Goal 2 (SLP)    Oral Nutrition/Hydration Goal 2, SLP Tolerate trials of puree with some soft trials without difficulty with 100% accuracy and no cues  -SM      Time Frame (Oral Nutrition/Hydration Goal 2, SLP) short term goal (STG);by discharge  -SM         Labial Strengthening Goal 1 (SLP)    Activity (Labial Strengthening Goal 1, SLP) increase labial tone  -SM      Increase Labial Tone labial resistance exercises  -SM      [place]/Accuracy (Labial Strengthening Goal 1, SLP) independently (over 90% accuracy)  -SM      Time Frame (Labial Strengthening Goal 1, SLP) short term goal (STG);by discharge  -SM         Lingual Strengthening Goal 1 (SLP)    Activity (Lingual Strengthening Goal 1, SLP) increase lingual tone/sensation/control/coordination/movement  -SM      Increase Lingual Tone/Sensation/Control/Coordination/Movement lingual resistance exercises  -SM      [place]/Accuracy (Lingual Strengthening Goal 1, SLP) independently (over 90% accuracy)  -SM      Time Frame (Lingual Strengthening Goal 1, SLP) short term goal (STG);by discharge  -SM         Pharyngeal Strengthening Exercise Goal 1 (SLP)    Activity (Pharyngeal Strengthening Goal 1, SLP) increase anterior movement of the hyolaryngeal complex;increase squeeze/positive pressure generation  -SM      Increase Anterior Movement of the Hyolaryngeal Complex chin tuck against resistance (CTAR)  -SM      Increase Squeeze/Positive Pressure Generation hard effortful swallow  -SM      [place]/Accuracy (Pharyngeal Strengthening Goal 1, SLP) independently (over 90% accuracy)  -SM      Time Frame (Pharyngeal Strengthening Goal 1, SLP) short term goal (STG);by discharge  -SM         Pharyngeal Strengthening Exercise Goal 2 (SLP)    Activity (Pharyngeal Strengthening Goal 1,  SLP) increase timing;increase superior movement of the hyolaryngeal complex  -SM      Increase Timing prepping - 3 second prep or suck swallow or 3-step swallow  -SM      Increase Superior Movement of the Hyolaryngeal Complex super-supraglottic swallow  -SM      [place]/Accuracy (Pharyngeal Strengthening Goal 1, SLP) independently (over 90% accuracy)  -SM      Time Frame (Pharyngeal Strengthening Goal 2, SLP) short term goal (STG);by discharge  -SM         Swallow Management Recall Goal 1 (SLP)    Activity (Swallow Management Recall Goal 1, SLP) independent recall of;recall of;safe diet/liquid level;compensatory swallow strategies/techniques;rationale for use of strategies/techniques  -SM      [place]/Accuracy (Swallow Management Recall Goal 1, SLP) independently (over 90% accuracy)  -SM      Time Frame (Swallow Management Recall Goal 1, SLP) short term goal (STG);by discharge  -SM         Phonation Goal 1 (SLP)    Improve Phonation By Goal 1 (SLP) using loud speech;90%;with minimal cues (75-90%)  -      Time Frame (Phonation Goal 1, SLP) short term goal (STG);by discharge  -SM         Articulation Goal 1 (SLP)    Improve Articulation Goal 1 (SLP) of specific sounds in words;of specific sounds in phrases;90%;with minimal cues (75-90%)  -SM      Time Frame (Articulation Goal 1, SLP) short term goal (STG);by discharge  -         Additional Goal 1 (SLP)    Additional Goal 1, SLP LTG: Improve cog-comm skills in order to participate in care while in hospital setting with 90% accuracy and min cues.   -      Time Frame (Additional Goal 1, SLP) by discharge  -        User Key  (r) = Recorded By, (t) = Taken By, (c) = Cosigned By    Initials Name Provider Type     Tori Pryor MS CCC-SLP Speech and Language Pathologist               Time Calculation:         Time Calculation- SLP     Row Name 07/06/18 1221             Time Calculation- SLP    SLP Start Time 1000  -      SLP Received On 07/06/18   -        User Key  (r) = Recorded By, (t) = Taken By, (c) = Cosigned By    Initials Name Provider Type     Tori Pryor, MS CCC-SLP Speech and Language Pathologist          Therapy Charges for Today     Code Description Service Date Service Provider Modifiers Qty    [id number] HC ST EVAL ORAL PHARYNG SWALLOW 2 7/6/2018 Tori Pryor, MS CCC-SLP GN 1    [id number] HC ST EVAL SPEECH AND PROD W LANG  2 7/6/2018 Tori Pryor MS CCC-SLP GN 1    [id number] HC ST FIBEROPTIC ENDO EVAL SWALL 5 7/6/2018 Tori Pryor, MS CCC-SLP GN 1               Tori Pryor, MS CCC-SLP  7/6/2018

## 2018-07-06 NOTE — PLAN OF CARE
Problem: Patient Care Overview  Goal: Plan of Care Review  Outcome: Ongoing (interventions implemented as appropriate)   07/06/18 1218   Coping/Psychosocial   Plan of Care Reviewed With patient;family   SLP evaluation and re-evaluation completed. FEES: Aspiration after the swallow with thin liquids 2' reduced sensation of residue in pyriform sinuses. Cough response and cleared. No aspiration with ice chips, nectar-thick liquids or puree. Mild diffuse residue, moderate in pyriform sinuses, does reduce with subsequent swallow. Anterior loss with liquids, could not manipulate solid bolus. Recommend: Dysphagia Level 2 Puree and nectar-thick liquids, ok with straws. Ok for ice chips between means (single chip at a time). Cog-Comm Eval: Moderate flaccid dysarthria. Simple communication and cognition functional. Pt fixated on being sleepy and hip pain (likely quite appropriate). Ox4, will further dx tx cog-comm skills and follow for speech tx. Please see note for further details and recommendations.

## 2018-07-06 NOTE — PROGRESS NOTES
INTENSIVIST   PROGRESS NOTE     Hospital:  LOS: 1 day     Ms. Cheyanne Bella, 89 y.o. female is followed for a Chief Complaint of: Right MCA Infarct s/p TPA, Afib      Subjective   S   Ms. Bella is a 89yo F who presented to the [facility] ED at 1157 via after after EMS was called by family who noticed a new left sided facial droop and slurred speech and weakness. The patient notes that she suddenly felt as if she couldn't stand. She was last known well at 0830 this morning. In the ED, she had a CT head performed which was negative for an acute intracranial process. She was then given TPA. CT perfusion was performed which showed an area of ischemia in the posterior and superior right frontal lobe with a small underlying core infarct. Neuro-interventional evaluated her imaging and did not think that she was a candidate for further intervention in the cath lab. She has also been going in and out of Afib which is new for her.     Interval History:  No events overnight. She is a little more sedated this AM and speech is not quite as clear. She complains of hip pain which is chronic.        The patient's relevant past medical, surgical and social history were reviewed and updated in Epic as appropriate.      ROS:   Constitutional: Negative for fever.   Respiratory: Negative for dyspnea.   Cardiovascular: Negative for chest pain.   Gastrointestinal: Negative for  nausea, vomiting and diarrhea.     Objective   O     Vitals:  Temp  Min: 97.6 °F (36.4 °C)  Max: 100.7 °F (38.2 °C)  BP  Min: 125/59  Max: 180/101  Pulse  Min: 82  Max: 117  Resp  Min: 16  Max: 20  SpO2  Min: 89 %  Max: 99 % Flow (L/min)  Min: 2  Max: 2    Intake/Ouptut 24 hrs (7:00AM - 6:59 AM)  Intake & Output (last 3 days)       07/03 0701 - 07/04 0700 07/04 0701 - 07/05 0700 07/05 0701 - 07/06 0700 07/06 0701 - 07/07 0700    Urine (mL/kg/hr)   925     Stool   450     Total Output     1375      Net     -1375              Unmeasured Urine Occurrence   2 x              Physical Examination  Telemetry:  Normal sinus rhythm.    Constitutional:  No acute distress.   Cardiovascular: Normal rate, regular and rhythm. Normal heart sounds.  No murmurs, gallop or rub.   Respiratory: No respiratory distress. Normal respiratory effort.  Normal breath sounds  Clear to ascultation.    Abdominal:  Soft. No masses. Non-tender. No distension. No HSM.   Extremities: No digital cyanosis. No clubbing.  No peripheral edema.   Neurological:   Awakens easily.   Left lower extremity weakness.        Interval:  (ICU)  1a. Level of Consciousness: 1-->Not alert, but arousable by minor stimulation to obey, answer, or respond  1b. LOC Questions: 0-->Answers both questions correctly  1c. LOC Commands: 0-->Performs both tasks correctly  2. Best Gaze: 0-->Normal  3. Visual: 0-->No visual loss  4. Facial Palsy: 1-->Minor paralysis (flattened nasolabial fold, asymmetry on smiling)  5a. Motor Arm, Left: 1-->Drift, limb holds 90 (or 45) degrees, but drifts down before full 10 seconds, does not hit bed or other support  5b. Motor Arm, Right: 0-->No drift, limb holds 90 (or 45) degrees for full 10 secs  6a. Motor Leg, Left: 1-->Drift, leg falls by the end of the 5-sec period but does not hit bed  6b. Motor Leg, Right: 0-->No drift, leg holds 30 degree position for full 5 secs  7. Limb Ataxia: 0-->Absent  8. Sensory: 1-->Mild-to-moderate sensory loss, patient feels pinprick is less sharp or is dull on the affected side, or there is a loss of superficial pain with pinprick, but patient is aware of being touched  9. Best Language: 0-->No aphasia, normal  10. Dysarthria: 1-->Mild-to-moderate dysarthria, patient slurs at least some words and, at worst, can be understood with some difficulty  11. Extinction and Inattention (formerly Neglect): 0-->No abnormality    Total (NIH Stroke Scale): 6         Results from last 7 days  Lab Units 07/06/18  0511 07/05/18  1215 07/05/18  1209   WBC 10*3/mm3 8.95 5.10  --     HEMOGLOBIN g/dL 12.9 11.8  --    HEMOGLOBIN, POC g/dL  --   --  11.6*   MCV fL 85.9 83.9  --    PLATELETS 10*3/mm3 151 145*  --        Results from last 7 days  Lab Units 07/06/18  0511 07/05/18  1209   SODIUM mmol/L 139  --    POTASSIUM mmol/L 4.4  --    CO2 mmol/L 27.0  --    CREATININE mg/dL 0.94 1.00   GLUCOSE mg/dL 136*  --      Estimated Creatinine Clearance: 46.1 mL/min (by C-G formula based on SCr of 0.94 mg/dL).    Results from last 7 days  Lab Units 07/06/18  0511 07/05/18  1215   ALK PHOS U/L 74  --    BILIRUBIN mg/dL 0.9  --    ALT (SGPT) U/L 13 15   AST (SGOT) U/L 22 19             Images:  Imaging Results (last 24 hours)     Procedure Component Value Units Date/Time    Fiberoptic Endo (fees) [[id number]] Resulted:  07/06/18 1050     Updated:  07/06/18 1050    Narrative:       This procedure was auto-finalized with no dictation required.    MRI Brain Without Contrast [[id number]] Collected:  07/06/18 0000     Updated:  07/06/18 0307    Narrative:       EXAM:    MR Head Without Intravenous Contrast    EXAM DATE/TIME:    7/6/2018 12:00 AM    CLINICAL HISTORY:    89 years old, female; Unspecified atrial fibrillation; Cerebral infarction,   unspecified; Dysphagia, unspecified; Status post administration of tpa (rtpa)   in a different facility within the last 24 hours prior to admission to current   facility; Signs and symptoms; Other: CVA; Patient HX: Left-sided weakness left   side facial droop slurred speech CVA; Additional info: CVA. Left-sided weakness   left side facial droop slurred speech CVA    TECHNIQUE:    Magnetic resonance images of the head/brain without intravenous contrast in   multiple planes.    COMPARISON:    CT HEAD WO CONTRAST STROKE PROTOCOL 2018-07-05 11:52    FINDINGS:    Ventricular and subarachnoid spaces are unremarkable for patient age.  Major   vascular flow voids at the skull base are preserved.      No midline shift.  Mild nonspecific white matter gliosis is likely secondary    to chronic microvascular ischemia.  Large region of diffusion restriction   within the posterior aspect of the right MCA territory.  Associated T2   prolongation and gyral edema.      Visualized paranasal sinuses and mastoid air cells are clear.      Impression:         Large ischemic infarct within the right MCA territory, likely early subacute.    THIS DOCUMENT HAS BEEN ELECTRONICALLY SIGNED BY LONNY CERVANTES MD    CT Angiogram Head With & Without Contrast [[id number]] Collected:  07/05/18 1448     Updated:  07/05/18 8617    Narrative:       EXAMINATION: CT ANGIOGRAM HEAD W WO CONTRAST- 07/05/2018     INDICATION: Stroke; I63.9-Cerebral infarction, unspecified;  I48.91-Unspecified atrial fibrillation; Z92.82-Status post  administration of TPA (RTPA) in a different facility within the last 24  hours prior to admission to current facility         TECHNIQUE: Pre and post contrast 0.75 mm axial images through the brain  with axial, sagittal and coronal 2-D angiographic reconstructions of the  major intracranial arteries.     The radiation dose reduction device was turned on for each scan per the  ALARA (As Low as Reasonably Achievable) protocol.     COMPARISON: Cerebral perfusion head CT scan of same date.     FINDINGS:  Proximal intracranial internal carotid arteries appear  grossly normal. No high-grade stenosis is seen of the cavernous or  supraclinoid portions of the right and left ICA. Distal vertebral  arteries, basilar artery, and posterior cerebral arteries appear grossly  normal. Anterior cerebral arteries appear grossly normal. Left middle  cerebral artery and proximal branches appear normal, larger than the  right. On the right, there is high-grade branch stenosis or occlusion of  the superior temporal artery as seen on sagittal image numbers 27  through 29. No other focal stenosis is present.        Impression:       Right M2 branch occlusion or high-grade stenosis. Please  refer to sagittal images 27 through  29 series 9.     D:  07/05/2018  E:  07/05/2018     This report was finalized on 7/5/2018 11:32 PM by DR. Jasper Menchaca MD.       CT Angiogram Neck With & Without Contrast [[id number]] Collected:  07/05/18 1312     Updated:  07/05/18 2333    Narrative:       EXAMINATION: CT ANGIOGRAM NECK W WO CONTRAST- 07/05/2018     INDICATION: Stroke; I63.9-Cerebral infarction, unspecified;  I48.91-Unspecified atrial fibrillation; Z92.82-Status post  administration of TPA (RTPA) in a different facility within the last 24  hours prior to admission to current facility         TECHNIQUE: Pre and postcontrast 0.75 mm axial images through the neck  with sagittal and coronal 2-D reconstructions.     The radiation dose reduction device was turned on for each scan per the  ALARA (As Low as Reasonably Achievable) protocol.     COMPARISON: NONE     FINDINGS: Proximal subclavian arteries appear normally patent.     On the right, no significant common, internal, or external carotid  artery stenosis is seen.     On the left, no significant common, internal or external carotid artery  stenosis is seen.     Vertebral arteries appear to be codominant and normal in appearance.       Impression:       Neck CTA appears within normal limits.     D:  07/05/2018  E:  07/05/2018     This report was finalized on 7/5/2018 11:31 PM by DR. Jasper Menchaca MD.       CT Cerebral Perfusion With & Without Contrast [[id number]] Collected:  07/05/18 1441     Updated:  07/05/18 2331    Narrative:       EXAMINATION: CT CEREBRAL PERFUSION W WO CONTRAST- 07/05/2018     INDICATION: TIA, initial screening; I63.9-Cerebral infarction,  unspecified; I48.91-Unspecified atrial fibrillation; Z92.82-Status post  administration of TPA (RTPA) in a different facility within the last 24  hours prior to admission to current facility         TECHNIQUE: Cerebral perfusion analysis was performed using computed  tomography with contrast administration including postprocessing of  parametric  maps with determination of cerebral blood flow, cerebral  blood volume, and mean transit time.     The radiation dose reduction device was turned on for each scan per the  ALARA (As Low as Reasonably Achievable) protocol.     COMPARISON: Unenhanced head CT scan of same date.      FINDINGS: There is an approximately 8 x 4 cm area of increased mean  transit time and time to drain in the posterior and superior right  frontal lobe corresponding to similar sized area of decreased cerebral  blood flow. There is a small irregular underlying area of decreased  cerebral blood volume approximately 3.5 x 1.5 cm, consistent with small  core infarct. No significant perfusion abnormality is seen elsewhere.       Impression:       8 x 4 cm area of ischemia in the posterior and superior  right frontal lobe, with small underlying core infarct.     D:  07/05/2018  E:  07/05/2018     This report was finalized on 7/5/2018 11:29 PM by DR. Jasper Menchaca MD.       CT Head Without Contrast Stroke Protocol [[id number]] Collected:  07/05/18 1253     Updated:  07/05/18 2309    Narrative:       EXAMINATION: CT HEAD WO CONTRAST STROKE PROTOCOL-      INDICATION: Stroke.     TECHNIQUE: 5 mm unenhanced images through the brain.     The radiation dose reduction device was turned on for each scan per the  ALARA (As Low as Reasonably Achievable) protocol.     COMPARISON: None.     FINDINGS: The calvarium appears intact. Included paranasal sinuses and  mastoids appear clear. Soft tissue window images show mild generalized  cerebral atrophy for patient's age. Some mild patchy white matter  changes are seen in the subcortical white matter of the frontal lobes,  likely chronic. There is no evidence to suggest acute infarction, no  evidence of intracranial hemorrhage, mass or mass effect, hydrocephalus,  or abnormal extraaxial collection.       Impression:       No evidence of acute intracranial disease.     NOTE: Exam time is shown as 11:54 AM. Preliminary  report was given to  Dr. Cardenas at 11:57 AM     D:  07/05/2018  E:  07/05/2018     This report was finalized on 7/5/2018 11:07 PM by DR. Jasper Menchaca MD.       XR Chest 1 View [[id number]] Collected:  07/05/18 1417     Updated:  07/05/18 2240    Narrative:       EXAMINATION: XR CHEST 1 VW- 07/05/2018     INDICATION: Acute Stroke Protocol (onset < 12 hrs); I63.9-Cerebral  infarction, unspecified; I48.91-Unspecified atrial fibrillation;  Z92.82-Status post administration of tPA (rtPA) in a different facility  within the last 24 hours prior to admission to current facility      COMPARISON: NONE     FINDINGS: Heart shadow is mildly enlarged. Vasculature is slightly  cephalized. There is perihilar interstitial change, possibly very early  interstitial edema, and minimal left basilar atelectasis. No  pneumothorax is seen.       Impression:       Cardiomegaly and mild pulmonary vascular congestion. Mild  left basilar atelectasis noted.     D:  07/05/2018  E:  07/05/2018     This report was finalized on 7/5/2018 10:38 PM by DR. Jasper Menchaca MD.               Results: Reviewed.  I reviewed the patient's new laboratory and imaging results.  I independently reviewed the patient's new images.    Medications: Reviewed.    Assessment/Plan   A / P     Ms. Bella is a 89yo F who is admitted with an acute CVA and is s/p TPA. She was found to have new atrial fibrillation which may likely be the cause of her stroke. She is admitted to the ICU for closer monitoring.     Nutrition:   Diet Dysphagia; II - Pureed; Thin; Cardiac  Advance Directives:   Code Status and Medical Interventions:   Ordered at: 07/05/18 1609     Level Of Support Discussed With:    Patient     Code Status:    No CPR     Medical Interventions (Level of Support Prior to Arrest):    Full       Hospital Problem List     * (Principal)Acute CVA (cerebrovascular accident) (CMS/[facility])    Hypertension    Hyperlipemia    Coronary artery disease    Paroxysmal atrial fibrillation  (CMS/[facility])          Assessment / Plan:    1. Repeat CT head this afternoon 24 hours post-TPA  2. Echocardiogram with bubble performed, however, bubble portion was not interpreted.   3. Cardiology to see for new Afib  4. Neurology following  5. PT/OT  6. Speech evaluation  7. Continue to monitor in the ICU  8. AM labs    Plan of care and goals reviewed during interdisciplinary rounds.  I discussed the patient's findings and my recommendations with patient and nursing staff    Time: was greater than 35 minutes.      Anabel Toro, DO    Intensive Care Medicine and Pulmonary Medicine

## 2018-07-06 NOTE — CONSULTS
Cardiology Consult/H&P     Cheyanne Bella  1/30/1926  [phone]      07/06/18    DATE OF ADMISSION: 7/5/2018  [facility] 2B ICU    Tomi Allen MD  [address] / [place] [address]    Chief Complaint/Reason for Consultation: Atrial fibrillation, CVA    Problem List:  Patient Active Problem List   Diagnosis   • Basal cell carcinoma   • Acute CVA (cerebrovascular accident) (CMS/HCC)   • Hypertension   • Hyperlipemia   • Coronary artery disease   • Paroxysmal atrial fibrillation (CMS/HCC)       History of Present Illness:   Patient is a 89-year-old  female with past medical history notable for CAD status post CABG and stents, hypertension, hyperlipidemia who presented to [facility] yesterday morning after sudden onset of left-sided facial droop, slurred speech, and weakness.  CT head was negative for an acute intracranial process.  She was given TPA for suspected embolic CVA.  CT perfusion imaging which showed an area of ischemia in the posterior and superior right frontal lobe with a small underlying coronary infarct.  Per neuro interventional team, there was no indication for further intervention.  She has also been noted to have new onset paroxysmal atrial fibrillation during this admission which is the likely culprit for her CVA.  Per Dr. Marie, due to the size of the infarct, she will need to hold off on anticoagulation for approximately 10 days.  We have been consulted for further management of atrial fibrillation in the setting of recent CVA.  She is overall asymptomatic from an afib standpoint and denies any history of being told of any abnormal heart rhythm.  She does take ASA and plavix at home for a history of stents.    No Known Allergies    Prior to Admission Medications     Prescriptions Last Dose Informant Patient Reported? Taking?    clopidogrel (PLAVIX) 75 MG tablet   Yes Yes    Take 75 mg by mouth Daily.    furosemide (LASIX) 40 MG tablet   Yes Yes     Take 40 mg by mouth Daily.    HYDROcodone-acetaminophen (NORCO) 7.5-325 MG per tablet   Yes Yes    Take 1 tablet by mouth Every 8 (Eight) Hours As Needed.    losartan (COZAAR) 50 MG tablet   Yes Yes    Take 50 mg by mouth Daily.    Magnesium Hydroxide (MILK OF MAGNESIA PO)   Yes Yes    Take 30 mL by mouth Every Other Day.    omega-3 acid ethyl esters (LOVAZA) 1 G capsule   Yes No    Take 1 g by mouth 2 (Two) Times a Day.    potassium chloride (K-DUR,KLOR-CON) 20 MEQ CR tablet   Yes Yes    Take 20 mEq by mouth Daily.            Current Facility-Administered Medications:   •  aspirin tablet 325 mg, 325 mg, Oral, Daily **OR** aspirin suppository 300 mg, 300 mg, Rectal, Daily, Sandra Rowe [facility]  •  atorvastatin (LIPITOR) tablet 80 mg, 80 mg, Oral, Nightly, Anabel V. Case, DO  •  furosemide (LASIX) tablet 40 mg, 40 mg, Oral, Daily, Anabel V. Case, DO, 40 mg at 07/06/18 1109  •  HYDROcodone-acetaminophen (NORCO) 7.5-325 MG per tablet 1 tablet, 1 tablet, Oral, Q6H PRN, Anabel V. Case, DO, 1 tablet at 07/06/18 1110  •  losartan (COZAAR) tablet 50 mg, 50 mg, Oral, Q24H, Anabel V. Case, DO, 50 mg at 07/06/18 1110  •  niCARdipine (CARDENE-IV) 20 mg/200 mL (0.1 mg/mL) in 0.9% NaCl infusion, 5-15 mg/hr, Intravenous, Titrated, Anabel V. Case, DO  •  polyethylene glycol 3350 powder (packet), 17 g, Oral, Daily, Anabel V. Case, DO  •  sodium chloride 0.9 % flush 1-10 mL, 1-10 mL, Intravenous, PRN, Anabel V. Case, DO  •  sodium chloride 0.9 % flush 10 mL, 10 mL, Intravenous, PRN, Nick Hannon MD    Social History     Social History   • Marital status:      Social History Main Topics   • Smoking status: Never Smoker   • Drug use: Unknown     Other Topics Concern   • Not on file       History reviewed. No pertinent family history.    REVIEW OF SYSTEMS:   CONST:  + fatigue, weakness, no weight loss, fever, chills  HEENT:  No visual loss, blurred vision, double vision, yellow sclerae.                   No hearing loss,  congestion, sore throat, + speech difficulty  SKIN:      No rashes, urticaria, ulcers, sores.     RESP:     No shortness of breath, hemoptysis, cough, sputum.   GI:           No anorexia, nausea, vomiting, diarrhea. No abdominal pain, melena.   :         No burning on urination, hematuria or increased frequency.  ENDO:    No diaphoresis, cold or heat intolerance. No polyuria or polydipsia.   NEURO:  No headache, dizziness, syncope ataxia, or                   No change in bowel or bladder control. No history of CVA/TIA  MUSC:    + hip pain  HEME:    No anemia, bleeding, bruising. No history of DVT/PE.  PSYCH:  No history of depression, anxiety    Vitals:    07/06/18 0600 07/06/18 0700 07/06/18 0800 07/06/18 0900   BP: 125/59 135/81 151/65 164/81   Pulse: 84 85 101 105   Resp: 16  16    Temp:   99.7 °F (37.6 °C)    TempSrc:   Axillary    SpO2: 99% 98% 90% 93%   Weight:       Height:             Vital Sign Min/Max for last 24 hours  Temp  Min: 97.6 °F (36.4 °C)  Max: 100.7 °F (38.2 °C)   BP  Min: 125/59  Max: 180/101   Pulse  Min: 82  Max: 117   Resp  Min: 16  Max: 20   SpO2  Min: 89 %  Max: 99 %   Flow (L/min)  Min: 2  Max: 2      Intake/Output Summary (Last 24 hours) at 07/06/18 1255  Last data filed at 07/06/18 0600   Gross per 24 hour   Intake                0 ml   Output             1375 ml   Net            -1375 ml             Physical Exam:  GEN: Well nourished, well-developed, no acute distress  HEENT: Normocephalic, atraumatic, PERRLA, moist mucous membranes  NECK: Supple, No JVD, no thyromegaly, no lymphadenopathy  CARD: Irregularly irregular, S1S2, 2/6 systolic ejection murmur, no gallop, or rub  LUNGS: Clear to auscultation, normal respiratory effort  ABDOMEN: Soft, nontender, normal bowel sounds  EXTREMITIES: Left sided weakness, no clubbing, cyanosis, or edema  SKIN: Warm, dry  NEURO: Left cas-paresis, alert and oriented x 3  PSYCHIATRIC: Normal affect and mood      Data:     Results from last 7  days  Lab Units 07/06/18  0511 07/05/18  1215 07/05/18  1209   WBC 10*3/mm3 8.95 5.10  --    HEMOGLOBIN g/dL 12.9 11.8  --    HEMOGLOBIN, POC g/dL  --   --  11.6*   HEMATOCRIT % 38.4 35.5  --    HEMATOCRIT POC %  --   --  34*   PLATELETS 10*3/mm3 151 145*  --        Results from last 7 days  Lab Units 07/06/18  0511 07/05/18  1209   SODIUM mmol/L 139  --    POTASSIUM mmol/L 4.4  --    CHLORIDE mmol/L 101  --    CO2 mmol/L 27.0  --    BUN mg/dL 15  --    CREATININE mg/dL 0.94 1.00   GLUCOSE mg/dL 136*  --         Results from last 7 days  Lab Units 07/06/18  0511   HEMOGLOBIN A1C % 5.60               Results from last 7 days  Lab Units 07/06/18  0511 07/05/18  1215 07/05/18  1208   PROTIME Seconds 10.6  --  11.0*   INR  1.01  --  0.9   APTT seconds  --  27.2  --            Results from last 7 days  Lab Units 07/06/18  0511   CHOLESTEROL mg/dL 164   TRIGLYCERIDES mg/dL 131   HDL CHOL mg/dL 49   LDL CHOL mg/dL 90           Intake/Output Summary (Last 24 hours) at 07/06/18 1255  Last data filed at 07/06/18 0600   Gross per 24 hour   Intake                0 ml   Output             1375 ml   Net            -1375 ml       Chest X-Ray:  Imaging Results (last 24 hours)     Procedure Component Value Units Date/Time    Fiberoptic Endo (fees) [[id number]] Resulted:  07/06/18 1050     Updated:  07/06/18 1050    Narrative:       This procedure was auto-finalized with no dictation required.    MRI Brain Without Contrast [[id number]] Collected:  07/06/18 0000     Updated:  07/06/18 0307    Narrative:       EXAM:    MR Head Without Intravenous Contrast    EXAM DATE/TIME:    7/6/2018 12:00 AM    CLINICAL HISTORY:    89 years old, female; Unspecified atrial fibrillation; Cerebral infarction,   unspecified; Dysphagia, unspecified; Status post administration of tpa (rtpa)   in a different facility within the last 24 hours prior to admission to current   facility; Signs and symptoms; Other: CVA; Patient HX: Left-sided weakness left   side  facial droop slurred speech CVA; Additional info: CVA. Left-sided weakness   left side facial droop slurred speech CVA    TECHNIQUE:    Magnetic resonance images of the head/brain without intravenous contrast in   multiple planes.    COMPARISON:    CT HEAD WO CONTRAST STROKE PROTOCOL 2018-07-05 11:52    FINDINGS:    Ventricular and subarachnoid spaces are unremarkable for patient age.  Major   vascular flow voids at the skull base are preserved.      No midline shift.  Mild nonspecific white matter gliosis is likely secondary   to chronic microvascular ischemia.  Large region of diffusion restriction   within the posterior aspect of the right MCA territory.  Associated T2   prolongation and gyral edema.      Visualized paranasal sinuses and mastoid air cells are clear.      Impression:         Large ischemic infarct within the right MCA territory, likely early subacute.    THIS DOCUMENT HAS BEEN ELECTRONICALLY SIGNED BY LONNY CERVANTES MD    CT Angiogram Head With & Without Contrast [[id number]] Collected:  07/05/18 1448     Updated:  07/05/18 2335    Narrative:       EXAMINATION: CT ANGIOGRAM HEAD W WO CONTRAST- 07/05/2018     INDICATION: Stroke; I63.9-Cerebral infarction, unspecified;  I48.91-Unspecified atrial fibrillation; Z92.82-Status post  administration of TPA (RTPA) in a different facility within the last 24  hours prior to admission to current facility         TECHNIQUE: Pre and post contrast 0.75 mm axial images through the brain  with axial, sagittal and coronal 2-D angiographic reconstructions of the  major intracranial arteries.     The radiation dose reduction device was turned on for each scan per the  ALARA (As Low as Reasonably Achievable) protocol.     COMPARISON: Cerebral perfusion head CT scan of same date.     FINDINGS:  Proximal intracranial internal carotid arteries appear  grossly normal. No high-grade stenosis is seen of the cavernous or  supraclinoid portions of the right and left ICA. Distal  vertebral  arteries, basilar artery, and posterior cerebral arteries appear grossly  normal. Anterior cerebral arteries appear grossly normal. Left middle  cerebral artery and proximal branches appear normal, larger than the  right. On the right, there is high-grade branch stenosis or occlusion of  the superior temporal artery as seen on sagittal image numbers 27  through 29. No other focal stenosis is present.        Impression:       Right M2 branch occlusion or high-grade stenosis. Please  refer to sagittal images 27 through 29 series 9.     D:  07/05/2018  E:  07/05/2018     This report was finalized on 7/5/2018 11:32 PM by DR. Jasper Menchaca MD.       CT Angiogram Neck With & Without Contrast [[id number]] Collected:  07/05/18 1312     Updated:  07/05/18 2333    Narrative:       EXAMINATION: CT ANGIOGRAM NECK W WO CONTRAST- 07/05/2018     INDICATION: Stroke; I63.9-Cerebral infarction, unspecified;  I48.91-Unspecified atrial fibrillation; Z92.82-Status post  administration of TPA (RTPA) in a different facility within the last 24  hours prior to admission to current facility         TECHNIQUE: Pre and postcontrast 0.75 mm axial images through the neck  with sagittal and coronal 2-D reconstructions.     The radiation dose reduction device was turned on for each scan per the  ALARA (As Low as Reasonably Achievable) protocol.     COMPARISON: NONE     FINDINGS: Proximal subclavian arteries appear normally patent.     On the right, no significant common, internal, or external carotid  artery stenosis is seen.     On the left, no significant common, internal or external carotid artery  stenosis is seen.     Vertebral arteries appear to be codominant and normal in appearance.       Impression:       Neck CTA appears within normal limits.     D:  07/05/2018  E:  07/05/2018     This report was finalized on 7/5/2018 11:31 PM by DR. Jasper Menchaca MD.       CT Cerebral Perfusion With & Without Contrast [[id number]] Collected:   07/05/18 1441     Updated:  07/05/18 2331    Narrative:       EXAMINATION: CT CEREBRAL PERFUSION W WO CONTRAST- 07/05/2018     INDICATION: TIA, initial screening; I63.9-Cerebral infarction,  unspecified; I48.91-Unspecified atrial fibrillation; Z92.82-Status post  administration of TPA (RTPA) in a different facility within the last 24  hours prior to admission to current facility         TECHNIQUE: Cerebral perfusion analysis was performed using computed  tomography with contrast administration including postprocessing of  parametric maps with determination of cerebral blood flow, cerebral  blood volume, and mean transit time.     The radiation dose reduction device was turned on for each scan per the  ALARA (As Low as Reasonably Achievable) protocol.     COMPARISON: Unenhanced head CT scan of same date.      FINDINGS: There is an approximately 8 x 4 cm area of increased mean  transit time and time to drain in the posterior and superior right  frontal lobe corresponding to similar sized area of decreased cerebral  blood flow. There is a small irregular underlying area of decreased  cerebral blood volume approximately 3.5 x 1.5 cm, consistent with small  core infarct. No significant perfusion abnormality is seen elsewhere.       Impression:       8 x 4 cm area of ischemia in the posterior and superior  right frontal lobe, with small underlying core infarct.     D:  07/05/2018  E:  07/05/2018     This report was finalized on 7/5/2018 11:29 PM by DR. Jasper Menchaca MD.       CT Head Without Contrast Stroke Protocol [[id number]] Collected:  07/05/18 1253     Updated:  07/05/18 9310    Narrative:       EXAMINATION: CT HEAD WO CONTRAST STROKE PROTOCOL-      INDICATION: Stroke.     TECHNIQUE: 5 mm unenhanced images through the brain.     The radiation dose reduction device was turned on for each scan per the  ALARA (As Low as Reasonably Achievable) protocol.     COMPARISON: None.     FINDINGS: The calvarium appears intact.  Included paranasal sinuses and  mastoids appear clear. Soft tissue window images show mild generalized  cerebral atrophy for patient's age. Some mild patchy white matter  changes are seen in the subcortical white matter of the frontal lobes,  likely chronic. There is no evidence to suggest acute infarction, no  evidence of intracranial hemorrhage, mass or mass effect, hydrocephalus,  or abnormal extraaxial collection.       Impression:       No evidence of acute intracranial disease.     NOTE: Exam time is shown as 11:54 AM. Preliminary report was given to  Dr. Cardenas at 11:57 AM     D:  07/05/2018  E:  07/05/2018     This report was finalized on 7/5/2018 11:07 PM by DR. Jasper Menchaca MD.       XR Chest 1 View [[id number]] Collected:  07/05/18 1417     Updated:  07/05/18 2240    Narrative:       EXAMINATION: XR CHEST 1 VW- 07/05/2018     INDICATION: Acute Stroke Protocol (onset < 12 hrs); I63.9-Cerebral  infarction, unspecified; I48.91-Unspecified atrial fibrillation;  Z92.82-Status post administration of tPA (rtPA) in a different facility  within the last 24 hours prior to admission to current facility      COMPARISON: NONE     FINDINGS: Heart shadow is mildly enlarged. Vasculature is slightly  cephalized. There is perihilar interstitial change, possibly very early  interstitial edema, and minimal left basilar atelectasis. No  pneumothorax is seen.       Impression:       Cardiomegaly and mild pulmonary vascular congestion. Mild  left basilar atelectasis noted.     D:  07/05/2018  E:  07/05/2018     This report was finalized on 7/5/2018 10:38 PM by DR. Jasper Menchaca MD.             Telemetry: Atrial fibrillation, HR  bpm  EKG 7/5/18: Atrial fibrillation, HR 85 bpm    Assessment and Plan:   1. Paroxysmal atrial fibrillation:  - Noted during hospital admission, likely the culprit for her CVA, overall rate controlled, can add low dose metoprolol if neededIn the future  - Echo 7/5 showing EF 66-70%, mild MS, moderate TR,  unable to obtain adequate bubble study  - As per Dr. Marie, given the size of her infarct, would need to hold off on anticoagulation for at least 10 days, and would start low-dose Xarelto) 15 mg daily) at that time.  Would stop Plavix to avoid triple anticoagulation.    CVA:  - Large right parietal lobe acute infarct, s/p TPA  - Neurology following  - Continue PT/OT/speech therapy  - Plan for repeat CT head this afternoon    If any further questions feel free to contact us.      Scribed for Blayne Shields MD by Aviva Quintanilla, APRN. 7/6/2018  12:55 PM    I, Blayne Shields MD, personally performed the services described in this documentation as scribed by the above individual in my presence, and it is both accurate and complete

## 2018-07-06 NOTE — PLAN OF CARE
Problem: Skin Injury Risk (Adult)  Goal: Identify Related Risk Factors and Signs and Symptoms  Outcome: Ongoing (interventions implemented as appropriate)   07/06/18 0055   Skin Injury Risk (Adult)   Related Risk Factors (Skin Injury Risk) advanced age;critical care admission;mobility impaired     Goal: Skin Health and Integrity  Outcome: Ongoing (interventions implemented as appropriate)      Problem: Thrombolytic Therapy (Adult)  Goal: Signs and Symptoms of Listed Potential Problems Will be Absent, Minimized or Managed (Thrombolytic Therapy)  Outcome: Ongoing (interventions implemented as appropriate)   07/06/18 0055   Goal/Outcome Evaluation   Problems Assessed (Thrombolytic Therapy) all   Problems Assessed (Thrombolytic Therapy) none       Problem: Stroke (Ischemic) (Adult)  Goal: Signs and Symptoms of Listed Potential Problems Will be Absent, Minimized or Managed (Stroke)  Outcome: Ongoing (interventions implemented as appropriate)   07/06/18 0055   Goal/Outcome Evaluation   Problems Assessed (Stroke (Ischemic)) all   Problems Assessed (Stroke (Ischemic)) communication impairment;eating/swallowing impairment       Problem: Patient Care Overview  Goal: Plan of Care Review  Outcome: Ongoing (interventions implemented as appropriate)   07/06/18 0055   Coping/Psychosocial   Plan of Care Reviewed With patient   Coping/Psychosocial   Patient Agreement with Plan of Care agrees   Plan of Care Review   Progress improving     Goal: Individualization and Mutuality  Outcome: Ongoing (interventions implemented as appropriate)   07/06/18 0055   Individualization   Patient Specific Goals (Include Timeframe) increased mobility, and to be able to tolerate PO       Problem: Patient Care Overview  Goal: Plan of Care Review  Outcome: Ongoing (interventions implemented as appropriate)   07/06/18 0055   Coping/Psychosocial   Plan of Care Reviewed With patient     Goal: Individualization and Mutuality  Outcome: Ongoing (interventions  implemented as appropriate)   07/06/18 0055   Individualization   Patient Specific Goals (Include Timeframe) increased mobility, and to be able to tolerate PO     Goal: Discharge Needs Assessment  Outcome: Ongoing (interventions implemented as appropriate)

## 2018-07-06 NOTE — PROGRESS NOTES
Neurology       Patient Care Team:  Tomi Allen MD as PCP - General (Family Medicine)  Natasha Yeager MD as Referring Physician (Dermatology)  Courtney Pate MD as Consulting Physician (Radiation Oncology)    Chief complaint: Stroke    History:  Patient's primary complaint now is hip pain.  She feels quite fatigued and required maximum assistance to did go into a sitting position with a 25% effort on her part.    She does not see the physical therapist yet.      Past Medical History:   Diagnosis Date   • Colon cancer (CMS/HCC)    • Coronary artery disease    • Hyperlipemia    • Hypertension        Vital Signs   Vitals:    07/06/18 0600 07/06/18 0700 07/06/18 0800 07/06/18 0900   BP: 125/59 135/81 151/65 164/81   Pulse: 84 85 101 105   Resp: 16  16    Temp:   99.7 °F (37.6 °C)    TempSrc:   Axillary    SpO2: 99% 98% 90% 93%   Weight:       Height:           Physical Exam:   General: Pleasant and alert              Neuro: Lethargic but oriented.    Speech is mildly dysarthric.    Cranial nerves show a left homonymous hemianopsia.    She has left parietal neglect interface.    Left leg is quite strong.  The left arm is antigravity with some significant drift and decreased .        Results Review:  MRI shows a large right parietal lobe infarct    Cardiac monitor shows atrial fibrillation.        Results from last 7 days  Lab Units 07/06/18  0511   WBC 10*3/mm3 8.95   HEMOGLOBIN g/dL 12.9   HEMATOCRIT % 38.4   PLATELETS 10*3/mm3 151       Results from last 7 days  Lab Units 07/06/18  0511 07/05/18  1215 07/05/18  1209   SODIUM mmol/L 139  --   --    POTASSIUM mmol/L 4.4  --   --    CHLORIDE mmol/L 101  --   --    CO2 mmol/L 27.0  --   --    BUN mg/dL 15  --   --    CREATININE mg/dL 0.94  --  1.00   CALCIUM mg/dL 9.0  --   --    BILIRUBIN mg/dL 0.9  --   --    ALK PHOS U/L 74  --   --    ALT (SGPT) U/L 13 15  --    AST (SGOT) U/L 22 19  --    GLUCOSE mg/dL 136*  --   --        Imaging Results (last  24 hours)     Procedure Component Value Units Date/Time    Fiberoptic Endo (fees) [[id number]] Resulted:  07/06/18 1050     Updated:  07/06/18 1050    Narrative:       This procedure was auto-finalized with no dictation required.    MRI Brain Without Contrast [[id number]] Collected:  07/06/18 0000     Updated:  07/06/18 0307    Narrative:       EXAM:    MR Head Without Intravenous Contrast    EXAM DATE/TIME:    7/6/2018 12:00 AM    CLINICAL HISTORY:    89 years old, female; Unspecified atrial fibrillation; Cerebral infarction,   unspecified; Dysphagia, unspecified; Status post administration of tpa (rtpa)   in a different facility within the last 24 hours prior to admission to current   facility; Signs and symptoms; Other: CVA; Patient HX: Left-sided weakness left   side facial droop slurred speech CVA; Additional info: CVA. Left-sided weakness   left side facial droop slurred speech CVA    TECHNIQUE:    Magnetic resonance images of the head/brain without intravenous contrast in   multiple planes.    COMPARISON:    CT HEAD WO CONTRAST STROKE PROTOCOL 2018-07-05 11:52    FINDINGS:    Ventricular and subarachnoid spaces are unremarkable for patient age.  Major   vascular flow voids at the skull base are preserved.      No midline shift.  Mild nonspecific white matter gliosis is likely secondary   to chronic microvascular ischemia.  Large region of diffusion restriction   within the posterior aspect of the right MCA territory.  Associated T2   prolongation and gyral edema.      Visualized paranasal sinuses and mastoid air cells are clear.      Impression:         Large ischemic infarct within the right MCA territory, likely early subacute.    THIS DOCUMENT HAS BEEN ELECTRONICALLY SIGNED BY LONNY CERVANTES MD    CT Angiogram Head With & Without Contrast [[id number]] Collected:  07/05/18 1448     Updated:  07/05/18 2335    Narrative:       EXAMINATION: CT ANGIOGRAM HEAD W WO CONTRAST- 07/05/2018     INDICATION: Stroke;  I63.9-Cerebral infarction, unspecified;  I48.91-Unspecified atrial fibrillation; Z92.82-Status post  administration of TPA (RTPA) in a different facility within the last 24  hours prior to admission to current facility         TECHNIQUE: Pre and post contrast 0.75 mm axial images through the brain  with axial, sagittal and coronal 2-D angiographic reconstructions of the  major intracranial arteries.     The radiation dose reduction device was turned on for each scan per the  ALARA (As Low as Reasonably Achievable) protocol.     COMPARISON: Cerebral perfusion head CT scan of same date.     FINDINGS:  Proximal intracranial internal carotid arteries appear  grossly normal. No high-grade stenosis is seen of the cavernous or  supraclinoid portions of the right and left ICA. Distal vertebral  arteries, basilar artery, and posterior cerebral arteries appear grossly  normal. Anterior cerebral arteries appear grossly normal. Left middle  cerebral artery and proximal branches appear normal, larger than the  right. On the right, there is high-grade branch stenosis or occlusion of  the superior temporal artery as seen on sagittal image numbers 27  through 29. No other focal stenosis is present.        Impression:       Right M2 branch occlusion or high-grade stenosis. Please  refer to sagittal images 27 through 29 series 9.     D:  07/05/2018  E:  07/05/2018     This report was finalized on 7/5/2018 11:32 PM by DR. Jasper Menchaca MD.       CT Angiogram Neck With & Without Contrast [[id number]] Collected:  07/05/18 1312     Updated:  07/05/18 2333    Narrative:       EXAMINATION: CT ANGIOGRAM NECK W WO CONTRAST- 07/05/2018     INDICATION: Stroke; I63.9-Cerebral infarction, unspecified;  I48.91-Unspecified atrial fibrillation; Z92.82-Status post  administration of TPA (RTPA) in a different facility within the last 24  hours prior to admission to current facility         TECHNIQUE: Pre and postcontrast 0.75 mm axial images through the  neck  with sagittal and coronal 2-D reconstructions.     The radiation dose reduction device was turned on for each scan per the  ALARA (As Low as Reasonably Achievable) protocol.     COMPARISON: NONE     FINDINGS: Proximal subclavian arteries appear normally patent.     On the right, no significant common, internal, or external carotid  artery stenosis is seen.     On the left, no significant common, internal or external carotid artery  stenosis is seen.     Vertebral arteries appear to be codominant and normal in appearance.       Impression:       Neck CTA appears within normal limits.     D:  07/05/2018  E:  07/05/2018     This report was finalized on 7/5/2018 11:31 PM by DR. Jasper Menchaca MD.       CT Cerebral Perfusion With & Without Contrast [[id number]] Collected:  07/05/18 1441     Updated:  07/05/18 2331    Narrative:       EXAMINATION: CT CEREBRAL PERFUSION W WO CONTRAST- 07/05/2018     INDICATION: TIA, initial screening; I63.9-Cerebral infarction,  unspecified; I48.91-Unspecified atrial fibrillation; Z92.82-Status post  administration of TPA (RTPA) in a different facility within the last 24  hours prior to admission to current facility         TECHNIQUE: Cerebral perfusion analysis was performed using computed  tomography with contrast administration including postprocessing of  parametric maps with determination of cerebral blood flow, cerebral  blood volume, and mean transit time.     The radiation dose reduction device was turned on for each scan per the  ALARA (As Low as Reasonably Achievable) protocol.     COMPARISON: Unenhanced head CT scan of same date.      FINDINGS: There is an approximately 8 x 4 cm area of increased mean  transit time and time to drain in the posterior and superior right  frontal lobe corresponding to similar sized area of decreased cerebral  blood flow. There is a small irregular underlying area of decreased  cerebral blood volume approximately 3.5 x 1.5 cm, consistent with  small  core infarct. No significant perfusion abnormality is seen elsewhere.       Impression:       8 x 4 cm area of ischemia in the posterior and superior  right frontal lobe, with small underlying core infarct.     D:  07/05/2018  E:  07/05/2018     This report was finalized on 7/5/2018 11:29 PM by DR. Jasper Menchaca MD.       CT Head Without Contrast Stroke Protocol [[id number]] Collected:  07/05/18 1253     Updated:  07/05/18 2309    Narrative:       EXAMINATION: CT HEAD WO CONTRAST STROKE PROTOCOL-      INDICATION: Stroke.     TECHNIQUE: 5 mm unenhanced images through the brain.     The radiation dose reduction device was turned on for each scan per the  ALARA (As Low as Reasonably Achievable) protocol.     COMPARISON: None.     FINDINGS: The calvarium appears intact. Included paranasal sinuses and  mastoids appear clear. Soft tissue window images show mild generalized  cerebral atrophy for patient's age. Some mild patchy white matter  changes are seen in the subcortical white matter of the frontal lobes,  likely chronic. There is no evidence to suggest acute infarction, no  evidence of intracranial hemorrhage, mass or mass effect, hydrocephalus,  or abnormal extraaxial collection.       Impression:       No evidence of acute intracranial disease.     NOTE: Exam time is shown as 11:54 AM. Preliminary report was given to  Dr. Cardenas at 11:57 AM     D:  07/05/2018  E:  07/05/2018     This report was finalized on 7/5/2018 11:07 PM by DR. Jasper Menchaca MD.       XR Chest 1 View [[id number]] Collected:  07/05/18 1417     Updated:  07/05/18 2240    Narrative:       EXAMINATION: XR CHEST 1 VW- 07/05/2018     INDICATION: Acute Stroke Protocol (onset < 12 hrs); I63.9-Cerebral  infarction, unspecified; I48.91-Unspecified atrial fibrillation;  Z92.82-Status post administration of tPA (rtPA) in a different facility  within the last 24 hours prior to admission to current facility      COMPARISON: NONE     FINDINGS: Heart shadow is  mildly enlarged. Vasculature is slightly  cephalized. There is perihilar interstitial change, possibly very early  interstitial edema, and minimal left basilar atelectasis. No  pneumothorax is seen.       Impression:       Cardiomegaly and mild pulmonary vascular congestion. Mild  left basilar atelectasis noted.     D:  07/05/2018  E:  07/05/2018     This report was finalized on 7/5/2018 10:38 PM by DR. Jasper Menchaca MD.             Assessment:  Large right parietal lobe acute infarct, post tPA    New onset atrial fib    Plan:  Cardiology to see.    Given the size of the infarct, anticoagulation will need to be deferred to at least 10 days.     Comment:  Guarded prognosis         I discussed the patients findings and my recommendations with patient and family    Ryland Marie MD  07/06/18  11:05 AM

## 2018-07-06 NOTE — THERAPY EVALUATION
Acute Care - Occupational Therapy Initial Evaluation  [facility]     Patient Name: Cheyanne Bella  : 1926  MRN: [id number]  Today's Date: 2018  Onset of Illness/Injury or Date of Surgery: 18  Date of Referral to OT: 18  Referring Physician: DO Cortez    Admit Date: 2018       ICD-10-CM ICD-9-CM   1. Acute CVA (cerebrovascular accident) (CMS/HCC) I63.9 434.91   2. Atrial fibrillation, unspecified type (CMS/HCC) I48.91 427.31   3. Received intravenous tissue plasminogen activator (t-PA) in emergency department Z92.82 V45.88   4. Dysphagia, unspecified type R13.10 787.20   5. Impaired mobility and ADLs Z74.09 799.89     Patient Active Problem List   Diagnosis   • Basal cell carcinoma   • Acute CVA (cerebrovascular accident) (CMS/HCC)   • Hypertension   • Hyperlipemia   • Coronary artery disease   • Paroxysmal atrial fibrillation (CMS/HCC)     Past Medical History:   Diagnosis Date   • Colon cancer (CMS/HCC)    • Coronary artery disease    • Hyperlipemia    • Hypertension      Past Surgical History:   Procedure Laterality Date   • COLON SURGERY     • COLOSTOMY     • CORONARY STENT PLACEMENT     • HERNIA REPAIR     • HYSTERECTOMY     • SKIN CANCER EXCISION            OT ASSESSMENT FLOWSHEET (last 72 hours)      Occupational Therapy Evaluation     Row Name 18 0827                   OT Evaluation Time/Intention    Subjective Information complains of;pain;fatigue  -CL        Document Type evaluation  -CL        Mode of Treatment occupational therapy  -CL        Patient Effort good  -CL        Symptoms Noted During/After Treatment fatigue  -CL           General Information    Patient Profile Reviewed? yes  -CL        Onset of Illness/Injury or Date of Surgery 18  -CL        Referring Physician Cortez DO  -CL        Prior Level of Function independent:;all household mobility;transfer;ADL's;dressing;bathing  -CL        Equipment Currently Used at Home rollator;shower chair  -CL         Pertinent History of Current Functional Problem Pt presented to the ED 2/2 to L sided weakness. CTP (+) M2 occlusion and small core infarct. Pt s/p tPA.   -CL        Existing Precautions/Restrictions fall;other (see comments)   L sided weakness  -CL        Limitations/Impairments safety/cognitive  -CL        Risks Reviewed patient:;nausea/vomiting;LOB;dizziness;increased discomfort;change in vital signs;lines disloged  -CL        Benefits Reviewed patient:;improve function;increase independence;increase strength;increase balance;decrease pain;increase knowledge  -CL        Barriers to Rehab none identified  -CL           Relationship/Environment    Lives With alone  -CL           Resource/Environmental Concerns    Current Living Arrangements independent/assisted living facility  -CL           Cognitive Assessment/Intervention- PT/OT    Affect/Mental Status (Cognitive) WFL  -CL        Orientation Status (Cognition) oriented x 4  -CL        Follows Commands (Cognition) follows one step commands;over 90% accuracy;verbal cues/prompting required;repetition of directions required  -CL        Cognitive Function (Cognitive) safety deficit  -CL        Safety Deficit (Cognitive) mild deficit;awareness of need for assistance;safety precautions awareness  -CL        Personal Safety Interventions fall prevention program maintained;gait belt;nonskid shoes/slippers when out of bed  -CL           Bed Mobility Assessment/Treatment    Bed Mobility Assessment/Treatment supine-sit  -CL        Supine-Sit [place] (Bed Mobility) maximum assist (25% patient effort);2 person assist;verbal cues  -CL        Assistive Device (Bed Mobility) head of bed elevated;draw sheet;bed rails  -CL           Functional Mobility    Functional Mobility- Comment Defer to PT.   -CL           Transfer Assessment/Treatment    Comment (Transfers) Defer to PT.   -CL           ADL Assessment/Intervention    BADL Assessment/Intervention lower body dressing  -CL            Lower Body Dressing Assessment/Training    Lower Body Dressing [place] Level don;socks;dependent (less than 25% patient effort)  -CL        Lower Body Dressing Position supine  -CL           General ROM    GENERAL ROM COMMENTS BUE shldr FE ~120, remainder grossly WFL.   -CL           General Assessment (Manual Muscle Testing)    Comment, General Manual Muscle Testing (MMT) Assessment RUE grossly 4-/5. LUE shldr FE 3-/5, bicep/tricep 3+/5,  2/5  -CL           Motor Assessment/Interventions    Additional Documentation Balance (Group);Gross Motor Coordination (Group)  -CL           Gross Motor Coordination    Gross Motor Impairments finger to nose  -CL        Gross Motor Skill, Impairments Detail LUE moderately impaired.   -CL           Balance    Balance static sitting balance  -CL           Static Sitting Balance    Level of [place] (Unsupported Sitting, Static Balance) moderate assist, 50 to 74% patient effort   progressed to CGA at EOB  -CL        Sitting Position (Unsupported Sitting, Static Balance) sitting on edge of bed  -CL           Sensory Assessment/Intervention    Sensory General Assessment no sensation deficits identified  -CL           Positioning and Restraints    Pre-Treatment Position in bed  -CL        Post Treatment Position bed  -CL        In Bed notified nsg;sitting EOB;call light within reach;encouraged to call for assist;with PT   transitioned to PT treatment  -CL           Pain Assessment    Additional Documentation Pain Scale 2: FACES Pre/Post-Treatment (Group)  -CL           Pain Scale 2: FACES Pre/Post-Treatment    Pain 2: FACES Scale, Pretreatment 2-->hurts little bit  -CL        Pain 2: FACES Scale, Post-Treatment 2-->hurts little bit  -CL        Pain Location 2 - Side Right  -CL        Pain Location 2 hip  -CL        Pre/Post Treatment Pain 2 Comment Tolerated.   -CL        Pain Intervention(s) 2 Repositioned;Ambulation/increased activity  -CL           Clinical  Impression (OT)    Date of Referral to OT 07/05/18  -CL        OT Diagnosis Decreased independence in ADLs.   -CL        Patient/Family Goals Statement (OT Eval) Return to PLOF.   -CL        Criteria for Skilled Therapeutic Interventions Met (OT Eval) yes;treatment indicated  -CL        Rehab Potential (OT Eval) good, to achieve stated therapy goals  -CL        Therapy Frequency (OT Eval) daily  -CL        Anticipated Equipment Needs at Discharge (OT) --   TBA further  -CL        Anticipated Discharge Disposition (OT) skilled nursing facility  -CL           Vital Signs    Pre Systolic BP Rehab 151  -CL        Pre Treatment Diastolic BP 65  -CL        Post Systolic BP Rehab 151  -CL        Post Treatment Diastolic BP 68  -CL        Pretreatment Heart Rate (beats/min) 96  -CL        Posttreatment Heart Rate (beats/min) 104  -CL        Pre SpO2 (%) 91  -CL        O2 Delivery Pre Treatment room air  -CL        Post SpO2 (%) 92  -CL        O2 Delivery Post Treatment room air  -CL        Pre Patient Position Supine  -CL        Intra Patient Position Sitting  -CL        Post Patient Position Sitting  -CL           OT Goals    Transfer Goal Selection (OT) transfer, OT goal 1  -CL        Dressing Goal Selection (OT) dressing, OT goal 1  -CL        Grooming Goal Selection (OT) grooming, OT goal 1  -CL        Strength Goal Selection (OT) strength, OT goal 1  -CL        Additional Documentation Grooming Goal Selection (OT) (Row);Strength Goal Selection (OT) (Row)  -CL           Transfer Goal 1 (OT)    Activity/Assistive Device (Transfer Goal 1, OT) sit-to-stand/stand-to-sit;toilet  -CL        [place] Level/Cues Needed (Transfer Goal 1, OT) moderate assist (50-74% patient effort);verbal cues required  -CL        Time Frame (Transfer Goal 1, OT) by discharge;long term goal (LTG)  -CL        Progress/Outcome (Transfer Goal 1, OT) goal ongoing  -CL           Dressing Goal 1 (OT)    Activity/Assistive Device (Dressing Goal 1,  OT) upper body dressing   Utilizing cas-dressing technique  -CL        [place]/Cues Needed (Dressing Goal 1, OT) minimum assist (75% or more patient effort);verbal cues required  -CL        Time Frame (Dressing Goal 1, OT) by discharge;long term goal (LTG)  -CL        Progress/Outcome (Dressing Goal 1, OT) goal ongoing  -CL           Grooming Goal 1 (OT)    Activity/Device (Grooming Goal 1, OT) wash face, hands;hair care   sitting EOB  -CL        [place] (Grooming Goal 1, OT) minimum assist (75% or more patient effort);verbal cues required  -CL        Time Frame (Grooming Goal 1, OT) by discharge;long term goal (LTG)  -CL        Progress/Outcome (Grooming Goal 1, OT) goal ongoing  -CL           Strength Goal 1 (OT)    Strength Goal 1 (OT) Pt will tolerate LUE AROM HEP x10 reps to promote ADL performance.   -CL        Time Frame (Strength Goal 1, OT) by discharge;long term goal (LTG)  -CL        Progress/Outcome (Strength Goal 1, OT) goal ongoing  -CL           Living Environment    Home Accessibility --   walk-in shower  -CL          User Key  (r) = Recorded By, (t) = Taken By, (c) = Cosigned By    Initials Name Effective Dates    CL Iman Perrin, OT 04/03/18 -            Occupational Therapy Education     Title: PT OT SLP Therapies (Active)     Topic: Occupational Therapy (Active)     Point: ADL training (Active)     Description: Instruct learner(s) on proper safety adaptation and remediation techniques during self care or transfers.   Instruct in proper use of assistive devices.   Learning Progress Summary     Learner Status Readiness Method Response Comment Documented by    Patient Active Acceptance E NR Pt educated on appropriate safety precautions, t/f techniques, positioning, and role of OT. CL 07/06/18 0929          Point: Precautions (Active)     Description: Instruct learner(s) on prescribed precautions during self-care and functional transfers.   Learning Progress Summary     Learner Status  Readiness Method Response Comment Documented by    Patient Active Acceptance E NR Pt educated on appropriate safety precautions, t/f techniques, positioning, and role of OT.  07/06/18 0929          Point: Body mechanics (Active)     Description: Instruct learner(s) on proper positioning and spine alignment during self-care, functional mobility activities and/or exercises.   Learning Progress Summary     Learner Status Readiness Method Response Comment Documented by    Patient Active Acceptance E NR Pt educated on appropriate safety precautions, t/f techniques, positioning, and role of OT.  07/06/18 0929                      User Key     Initials Effective Dates Name Provider Type Discipline     04/03/18 -  Iman Perrin, OT Occupational Therapist OT                  OT Recommendation and Plan  Outcome Summary/Treatment Plan (OT)  Anticipated Equipment Needs at Discharge (OT):  (TBA further)  Anticipated Discharge Disposition (OT): skilled nursing facility  Therapy Frequency (OT Eval): daily  Plan of Care Review  Plan of Care Reviewed With: patient  Plan of Care Reviewed With: patient  Outcome Summary: OT completed a brief chart review relating to presenting physical deficis. Pt Max Ax2 for bed mobility, limited d/t L sided weakness. Recommend cont skilled IPOT intervention. Recommend pt DC to SNF.           Outcome Measures     Row Name 07/06/18 0827             How much help from another is currently needed...    Putting on and taking off regular lower body clothing? 1  -CL      Bathing (including washing, rinsing, and drying) 2  -CL      Toileting (which includes using toilet bed pan or urinal) 1  -CL      Putting on and taking off regular upper body clothing 2  -CL      Taking care of personal grooming (such as brushing teeth) 2  -CL      Eating meals 3  -CL      Score 11  -CL         Modified Garvin Scale    Modified Garvin Scale 4 - Moderately severe disability.  Unable to walk without assistance, and  unable to attend to own bodily needs without assistance.  -CL         Functional Assessment    Outcome Measure Options AM-PAC 6 Clicks Daily Activity (OT);Modified Jan  -CL        User Key  (r) = Recorded By, (t) = Taken By, (c) = Cosigned By    Initials Name Provider Type    CL Iman Perrin OT Occupational Therapist          Time Calculation:   OT Start Time: 0827  Therapy Suggested Charges     Code   Minutes Charges    None           Therapy Charges for Today     Code Description Service Date Service Provider Modifiers Qty    [id number]  OT EVAL LOW COMPLEXITY 4 7/6/2018 Iman Perrin OT GO 1    [id number]  OT THER SUPP EA 15 MIN 7/6/2018 Iman Perrin OT GO 1               Iman Perrin OT  7/6/2018

## 2018-07-06 NOTE — THERAPY EVALUATION
Acute Care - Physical Therapy Initial Evaluation  [facility]     Patient Name: Cheyanne Bella  : 1926  MRN: [id number]  Today's Date: 2018   Onset of Illness/Injury or Date of Surgery: 18  Date of Referral to PT: 18  Referring Physician: DO Cortez      Admit Date: 2018    Visit Dx:     ICD-10-CM ICD-9-CM   1. Acute CVA (cerebrovascular accident) (CMS/HCC) I63.9 434.91   2. Atrial fibrillation, unspecified type (CMS/HCC) I48.91 427.31   3. Received intravenous tissue plasminogen activator (t-PA) in emergency department Z92.82 V45.88   4. Dysphagia, unspecified type R13.10 787.20   5. Impaired mobility and ADLs Z74.09 799.89   6. Impaired functional mobility, balance, gait, and endurance Z74.09 V49.89     Patient Active Problem List   Diagnosis   • Basal cell carcinoma   • Acute CVA (cerebrovascular accident) (CMS/HCC)   • Hypertension   • Hyperlipemia   • Coronary artery disease   • Paroxysmal atrial fibrillation (CMS/HCC)     Past Medical History:   Diagnosis Date   • Colon cancer (CMS/HCC)    • Coronary artery disease    • Hyperlipemia    • Hypertension      Past Surgical History:   Procedure Laterality Date   • COLON SURGERY     • COLOSTOMY     • CORONARY STENT PLACEMENT     • HERNIA REPAIR     • HYSTERECTOMY     • SKIN CANCER EXCISION          PT ASSESSMENT (last 12 hours)      Physical Therapy Evaluation     Row Name 18 0845          PT Evaluation Time/Intention    Subjective Information complains of;pain  -LS     Document Type evaluation  -LS     Mode of Treatment physical therapy  -LS     Patient Effort good  -LS     Symptoms Noted During/After Treatment fatigue  -LS     Row Name 18 0845          General Information    Patient Profile Reviewed? yes  -LS     Onset of Illness/Injury or Date of Surgery 18  -LS     Referring Physician Cortez DO  -LS     Prior Level of Function independent:;ADL's;all household mobility;bathing;dressing  -LS     Equipment Currently Used at  Home rollator;shower chair;commode, bedside;power chair, (recliner lift)  -LS     Pertinent History of Current Functional Problem To ED via EMS with L facial droop, slurred speech, weakness. S/p tPA. CT perf demonstrated R frontal ischemia. Found to have afib.   -LS     Existing Precautions/Restrictions fall;other (see comments)   L weakness  -LS     Risks Reviewed patient:;LOB;increased discomfort;dizziness  -LS     Benefits Reviewed patient:;improve function;increase independence;increase strength;increase knowledge  -     Barriers to Rehab none identified  -     Row Name 07/06/18 0845          Relationship/Environment    Lives With alone  -     Row Name 07/06/18 0845          Cognitive Assessment/Interventions    Additional Documentation Cognitive Assessment/Intervention (Group)  -     Row Name 07/06/18 0845          Cognitive Assessment/Intervention- PT/OT    Affect/Mental Status (Cognitive) WFL  -     Orientation Status (Cognition) oriented x 4  -LS     Follows Commands (Cognition) follows one step commands;over 90% accuracy;verbal cues/prompting required;physical/tactile prompts required  -     Safety Deficit (Cognitive) mild deficit;insight into deficits/self awareness;safety precautions awareness  -     Personal Safety Interventions fall prevention program maintained;gait belt;nonskid shoes/slippers when out of bed  -     Row Name 07/06/18 0845          Safety Issues, Functional Mobility    Impairments Affecting Function (Mobility) balance;coordination;endurance/activity tolerance;pain;strength;motor control  -     Row Name 07/06/18 0845          Bed Mobility Assessment/Treatment    Comment (Bed Mobility) sitting EOB upon arrival  -     Row Name 07/06/18 0845          Transfer Assessment/Treatment    Transfer Assessment/Treatment sit-stand transfer;stand-sit transfer  -     Comment (Transfers) Performed STS from EOB with PT/tech on either side of pt for safety.   -LS     Sit-Stand  Carter (Transfers) moderate assist (50% patient effort);2 person assist;verbal cues  -LS     Stand-Sit Carter (Transfers) moderate assist (50% patient effort);2 person assist;verbal cues  -     Row Name 07/06/18 0845          Gait/Stairs Assessment/Training    Gait/Stairs Assessment/Training gait/ambulation assistive device  -     Carter Level (Gait) moderate assist (50% patient effort);2 person assist;verbal cues  -LS     Distance in Feet (Gait) 2  -LS     Deviations/Abnormal Patterns (Gait) stride length decreased;gait speed decreased  -     Comment (Gait/Stairs) EOB to recliner only; pt able to advance LEs independently with no noted knee buckling. VC's for upright posture and full  foot clearance.   -     Row Name 07/06/18 0845          General ROM    GENERAL ROM COMMENTS BLE grossly WFL  -[facility] Name 07/06/18 0845          MMT (Manual Muscle Testing)    Additional Documentation General Assessment (Manual Muscle Testing) (Group)  -[facility] Name 07/06/18 0845          General Assessment (Manual Muscle Testing)    Comment, General Manual Muscle Testing (MMT) Assessment RLE grossly 4-/5, LLE grossly 3+/5  -     Row Name 07/06/18 0845          Motor Assessment/Intervention    Additional Documentation Balance (Group);Therapeutic Exercise (Group)  -     Row Name 07/06/18 0845          Therapeutic Exercise    66364 - PT Therapeutic Activity Minutes 8  -[facility] Name 07/06/18 0845          Balance    Balance static sitting balance;static standing balance  -[facility] Name 07/06/18 0845          Static Sitting Balance    Level of Carter (Unsupported Sitting, Static Balance) minimal assist, 75% patient effort  -     Sitting Position (Unsupported Sitting, Static Balance) sitting on edge of bed  -     Row Name 07/06/18 0845          Static Standing Balance    Level of Carter (Supported Standing, Static Balance) moderate assist, 50 to 74% patient effort  -     Assistive  Device Utilized (Supported Standing, Static Balance) --   BUE support via PT/tech  -     Row Name 07/06/18 0845          Sensory Assessment/Intervention    Sensory General Assessment no sensation deficits identified   BLEs grossly WFL  -     Row Name 07/06/18 0845          Pain Assessment    Additional Documentation Pain Scale: Numbers Pre/Post-Treatment (Group)  -     Row Name 07/06/18 0845          Pain Scale: Numbers Pre/Post-Treatment    Pain Scale: Numbers, Pretreatment 3/10  -LS     Pain Scale: Numbers, Post-Treatment 3/10  -LS     Pain Location - Side Right  -LS     Pain Location hip  -LS     Pre/Post Treatment Pain Comment tolerated  -LS     Pain Intervention(s) Ambulation/increased activity;Repositioned  -     Row Name 07/06/18 0845          Plan of Care Review    Plan of Care Reviewed With patient  -     Row Name 07/06/18 0845          Physical Therapy Clinical Impression    Date of Referral to PT 07/05/18  -     PT Diagnosis (PT Clinical Impression) impaired functional mobility, balance, gait  -LS     Patient/Family Goals Statement (PT Clinical Impression) return to [facility]  -     Criteria for Skilled Interventions Met (PT Clinical Impression) yes;treatment indicated  -LS     Rehab Potential (PT Clinical Summary) good, to achieve stated therapy goals  -LS     Care Plan Review (PT) evaluation/treatment results reviewed  -     Row Name 07/06/18 0845          Vital Signs    Pre Systolic BP Rehab 151  -LS     Pre Treatment Diastolic BP 68  -LS     Post Systolic BP Rehab 170  -LS     Post Treatment Diastolic BP 78  -LS     Pretreatment Heart Rate (beats/min) --   with OT  -LS     Posttreatment Heart Rate (beats/min) 96  -LS     O2 Delivery Pre Treatment room air  -LS     Post SpO2 (%) 93  -LS     O2 Delivery Post Treatment room air  -LS     Pre Patient Position Sitting  -LS     Intra Patient Position Standing  -LS     Post Patient Position Sitting  -LS     Row Name 07/06/18 0845          Physical  Therapy Goals    Bed Mobility Goal Selection (PT) bed mobility, PT goal 1  -LS     Transfer Goal Selection (PT) transfer, PT goal 1  -LS     Gait Training Goal Selection (PT) gait training, PT goal 1  -     Row Name 07/06/18 0845          Bed Mobility Goal 1 (PT)    Activity/Assistive Device (Bed Mobility Goal 1, PT) sit to supine/supine to sit  -LS     Steedman Level/Cues Needed (Bed Mobility Goal 1, PT) minimum assist (75% or more patient effort)  -LS     Time Frame (Bed Mobility Goal 1, PT) 2 weeks  -LS     Progress/Outcomes (Bed Mobility Goal 1, PT) goal ongoing  -     Row Name 07/06/18 0845          Transfer Goal 1 (PT)    Activity/Assistive Device (Transfer Goal 1, PT) sit-to-stand/stand-to-sit;bed-to-chair/chair-to-bed;walker, rolling  -LS     Steedman Level/Cues Needed (Transfer Goal 1, PT) contact guard assist  -LS     Time Frame (Transfer Goal 1, PT) 2 weeks  -LS     Progress/Outcome (Transfer Goal 1, PT) goal ongoing  -     Row Name 07/06/18 0845          Gait Training Goal 1 (PT)    Activity/Assistive Device (Gait Training Goal 1, PT) assistive device use;walker, rolling  -LS     Steedman Level (Gait Training Goal 1, PT) minimum assist (75% or more patient effort)  -LS     Distance (Gait Goal 1, PT) 50  -LS     Time Frame (Gait Training Goal 1, PT) 2 weeks  -LS     Progress/Outcome (Gait Training Goal 1, PT) goal ongoing  -     Row Name 07/06/18 0845          Positioning and Restraints    Pre-Treatment Position in bed  -LS     Post Treatment Position chair  -LS     In Chair notified nsg;reclined;call light within reach;encouraged to call for assist;exit alarm on;RUE elevated;LUE elevated;waffle cushion;on mechanical lift sling;legs elevated;heels elevated  -     Row Name 07/06/18 0845          Living Environment    Home Accessibility --   walk-in shower  -LS       User Key  (r) = Recorded By, (t) = Taken By, (c) = Cosigned By    Initials Name Provider Type    LS Kirstie Shipley, PT  Physical Therapist          Physical Therapy Education     Title: PT OT SLP Therapies (Active)     Topic: Physical Therapy (Active)     Point: Mobility training (Active)    Learning Progress Summary     Learner Status Readiness Method Response Comment Documented by    Patient Active Acceptance E,D NR  LS 07/06/18 1055          Point: Body mechanics (Active)    Learning Progress Summary     Learner Status Readiness Method Response Comment Documented by    Patient Active Acceptance E,D NR  LS 07/06/18 1055          Point: Precautions (Active)    Learning Progress Summary     Learner Status Readiness Method Response Comment Documented by    Patient Active Acceptance E,D NR  LS 07/06/18 1055                      User Key     Initials Effective Dates Name Provider Type Discipline     06/19/15 -  Kirstie Shipley, PT Physical Therapist PT                PT Recommendation and Plan  Anticipated Discharge Disposition (PT): inpatient rehabilitation facility  Planned Therapy Interventions (PT Eval): balance training, bed mobility training, gait training, home exercise program, strengthening, transfer training, patient/family education  Therapy Frequency (PT Clinical Impression): daily  Outcome Summary/Treatment Plan (PT)  Anticipated Discharge Disposition (PT): inpatient rehabilitation facility  Plan of Care Reviewed With: patient  Outcome Summary: PT evaluation completed. Pt demonstrates LLE weakness, balance deficits, and decreased indep re: functional mobility with evolving signs/symptoms, warranting further skilled PT services to promote PLOF. Limited today by fatigue. Recommend IP rehab at d/c.           Outcome Measures     Row Name 07/06/18 0845 07/06/18 0827          How much help from another person do you currently need...    Turning from your back to your side while in flat bed without using bedrails? 3  -LS  --     Moving from lying on back to sitting on the side of a flat bed without bedrails? 2  -LS  --     Moving  to and from a bed to a chair (including a wheelchair)? 2  -LS  --     Standing up from a chair using your arms (e.g., wheelchair, bedside chair)? 2  -LS  --     Climbing 3-5 steps with a railing? 1  -LS  --     To walk in hospital room? 2  -LS  --     AM-PAC 6 Clicks Score 12  -LS  --        How much help from another is currently needed...    Putting on and taking off regular lower body clothing?  -- 1  -CL     Bathing (including washing, rinsing, and drying)  -- 2  -CL     Toileting (which includes using toilet bed pan or urinal)  -- 1  -CL     Putting on and taking off regular upper body clothing  -- 2  -CL     Taking care of personal grooming (such as brushing teeth)  -- 2  -CL     Eating meals  -- 3  -CL     Score  -- 11  -CL        Modified Jan Scale    Modified Doniphan Scale 4 - Moderately severe disability.  Unable to walk without assistance, and unable to attend to own bodily needs without assistance.  -LS 4 - Moderately severe disability.  Unable to walk without assistance, and unable to attend to own bodily needs without assistance.  -CL        Functional Assessment    Outcome Measure Options AM-PAC 6 Clicks Basic Mobility (PT)  -LS AM-PAC 6 Clicks Daily Activity (OT);Modified Jan  -CL       User Key  (r) = Recorded By, (t) = Taken By, (c) = Cosigned By    Initials Name Provider Type    LION Shipley, PT Physical Therapist    CL Iman Perrin, OT Occupational Therapist           Time Calculation:         PT Charges     Row Name 07/06/18 0845             Time Calculation    Start Time 0845  -      PT Received On 07/06/18  -      PT Goal Re-Cert Due Date 07/16/18  -         Time Calculation- PT    Total Timed Code Minutes- PT 8 minute(s)  -         Timed Charges    40536 - PT Therapeutic Activity Minutes 8  -LS        User Key  (r) = Recorded By, (t) = Taken By, (c) = Cosigned By    Initials Name Provider Type    LION Shipley, PT Physical Therapist        Therapy Suggested Charges     Code    Minutes Charges    10241 (CPT®) Hc Pt Neuromusc Re Education Ea 15 Min      64976 (CPT®) Hc Pt Ther Proc Ea 15 Min      44525 (CPT®) Hc Gait Training Ea 15 Min      13108 (CPT®) Hc Pt Therapeutic Act Ea 15 Min 8 1    53439 (CPT®) Hc Pt Manual Therapy Ea 15 Min      48786 (CPT®) Hc Pt Iontophoresis Ea 15 Min      44144 (CPT®) Hc Pt Elec Stim Ea-Per 15 Min      30628 (CPT®) Hc Pt Ultrasound Ea 15 Min      33812 (CPT®) Hc Pt Self Care/Mgmt/Train Ea 15 Min      Total  8 1        Therapy Charges for Today     Code Description Service Date Service Provider Modifiers Qty    16180007688 HC PT EVAL MOD COMPLEXITY 3 7/6/2018 Kirstie Shipley, PT GP 1    87510224120 HC PT THERAPEUTIC ACT EA 15 MIN 7/6/2018 Kirstie Shipley, PT GP 1    55260511545 HC PT THER SUPP EA 15 MIN 7/6/2018 Kirstie Shipley, PT GP 1          PT G-Codes  Outcome Measure Options: AM-PAC 6 Clicks Basic Mobility (PT)      Kirstie Shipley, PT  7/6/2018

## 2018-07-06 NOTE — DISCHARGE PLACEMENT REQUEST
"Cheyanne Elias (89 y.o. Female) Miley Ugalde  [phone]    Date of Birth Social Security Number Address Home Phone MRN    01/30/1926  [address] [place] [address]  [address]  [place] [address] [phone] [id number]    [employer] Marital Status          Episcopalian        Admission Date Admission Type Admitting Provider Attending Provider Department, Room/Bed    7/5/18 Emergency Case, Anabel SANDS, DO Case, Anabel V., DO [facility] 2B ICU, N237/1    Discharge Date Discharge Disposition Discharge Destination                       Attending Provider:  Anabel Toro DO    Allergies:  No Known Allergies    Isolation:  None   Infection:  None   Code Status:  No CPR    Ht:  162.6 cm (64\")   Wt:  109 kg (240 lb)    Admission Cmt:  None   Principal Problem:  Acute CVA (cerebrovascular accident) (CMS/[facility]) [I63.9]                 Active Insurance as of 7/5/2018     Primary Coverage     Payor Plan Insurance Group Employer/Plan Group    MEDICARE MEDICARE A & B      Payor Plan Address Payor Plan Phone Number Effective From Effective To    [address] [phone] 1/1/1991     [facility] [id number]       Subscriber Name Subscriber Birth Date Member ID       CHEYANNE ELIAS 1/30/1926 [id number]           Secondary Coverage     Payor Plan Insurance Group Employer/Plan Group    [place]      Payor Plan Address Payor Plan Phone Number Effective From Effective To    [address] [address]  8/1/2016     [place] [address]       Subscriber Name Subscriber Birth Date Member ID       CHEYANNE ELIAS 1/30/1926 [id number]                 Emergency Contacts      (Rel.) Home Phone Work Phone Mobile Phone    Derek Elias (Son) [phone] -- --    Pamela Ugarte (Daughter) [phone] -- --            Insurance Information                MEDICARE/MEDICARE A & B Phone: [phone]    Subscriber: Cheyanne Elias Subscriber#: [id number]    Group#:  Precert#:         [employer] " [place] Phone:     Subscriber: Cheyanne Bella Subscriber#: [id number]    Group#:  Precert#:              History & Physical      Anabel Toro DO at 7/5/2018  2:45 PM          INTENSIVIST   INITIAL HOSPITAL VISIT NOTE     Hospital:  LOS: 0 days     Ms. Cheyanne Bella, 89 y.o. female is seen for a Chief Complaint of:  Chief Complaint   Patient presents with   • Stroke     Principal Problem:    Acute CVA (cerebrovascular accident) (CMS/HCC)  Active Problems:    Hypertension    Hyperlipemia    Coronary artery disease    Paroxysmal atrial fibrillation (CMS/HCC)      Subjective   S     Ms. Bella is a 89yo F who presented to the [facility] ED at 1157 via after after EMS was called by family who noticed a new left sided facial droop and slurred speech and weakness. The patient notes that she suddenly felt as if she couldn't stand. She was last known well at 0830 this morning. In the ED, she had a CT head performed which was negative for an acute intracranial process. She was then given TPA. CT perfusion was performed which showed an area of ischemia in the posterior and superior right frontal lobe with a small underlying core infarct. Neuro-interventional evaluated her imaging and did not think that she was a candidate for further intervention in the cath lab. She has also been going in and out of Afib which is new for her.        PMH: She  has a past medical history of Colon cancer (CMS/HCC); Coronary artery disease; Hyperlipemia; and Hypertension.   PSxH: She  has a past surgical history that includes Hysterectomy; Colon surgery; Colostomy; Hernia repair; Coronary stent placement; and Skin cancer excision.      Medications:  No current facility-administered medications on file prior to encounter.      Current Outpatient Prescriptions on File Prior to Encounter   Medication Sig   • clopidogrel (PLAVIX) 75 MG tablet    • furosemide (LASIX) 40 MG tablet    • HYDROcodone-acetaminophen (NORCO) 7.5-325 MG per tablet     • losartan (COZAAR) 50 MG tablet    • potassium chloride (K-DUR,KLOR-CON) 20 MEQ CR tablet    • omega-3 acid ethyl esters (LOVAZA) 1 G capsule    • polyethylene glycol (MIRALAX) powder        Allergies: She has No Known Allergies.   FH: Her family history is not on file.   SH: She  reports that she has never smoked. She does not have any smokeless tobacco history on file.     The patient's relevant past medical, surgical and social history were reviewed and updated in Epic as appropriate.     ROS (14):   Review of Systems   HENT: Negative.    Eyes: Negative.    Respiratory: Negative.    Cardiovascular: Negative.    Gastrointestinal: Negative.    Endocrine: Negative.    Genitourinary: Negative.    Musculoskeletal: Negative.    Skin: Negative.    Allergic/Immunologic: Negative.    Neurological: Positive for facial asymmetry, speech difficulty and weakness.   Hematological: Negative.    Psychiatric/Behavioral: Negative.        Objective   O     Vitals    Temp  Min: 98 °F (36.7 °C)  Max: 98.6 °F (37 °C)  BP  Min: 154/97  Max: 180/101  Pulse  Min: 89  Max: 117  Resp  Min: 16  Max: 18  SpO2  Min: 90 %  Max: 98 % No Data Recorded     I/O 24 hrs (7:00AM - 6:59 AM)   Intake/Output       07/04/18 0700 - 07/05/18 0659 07/05/18 0700 - 07/06/18 0659    Intake (ml) -- --    Output (ml) 0 450    Net (ml) 0 -450    Last Weight  --  109 kg (240 lb)          Medications (drips):    niCARdipine       Physical Examination    Telemetry: Paroxysmal Afib.    Constitutional:  No acute distress.  Conversant.   HEENT: Normocephalic and atraumatic.   Neck:  Normal range of motion. Neck supple.   No JVD present.   No thyromegaly.    Cardiovascular: Irregularly irregular rhythm.   No murmurs, rub or gallop.  No peripheral edema.   Respiratory: Normal respiratory effort.  Clear to ascultation.   Abdominal:  Soft. No masses.   Non-tender. No distension.   No hepatosplenomegaly.   MSK: Normal muscle strength and tone.   Extremities: No digital  cyanosis or clubbing.   Skin: Skin is warm and dry.  No rashes, lesions or ulcers noted.    Neurological:   Alert and Oriented to person, place, and time.   Moves all extremities.   Psychiatric:  Normal affect.   Normal judgment.     Interval:  (ICU)  1a. Level of Consciousness: 0-->Alert, keenly responsive  1b. LOC Questions: 0-->Answers both questions correctly  1c. LOC Commands: 0-->Performs both tasks correctly  2. Best Gaze: 0-->Normal  3. Visual: 0-->No visual loss  4. Facial Palsy: 1-->Minor paralysis (flattened nasolabial fold, asymmetry on smiling)  5a. Motor Arm, Left: 1-->Drift, limb holds 90 (or 45) degrees, but drifts down before full 10 seconds, does not hit bed or other support  5b. Motor Arm, Right: 0-->No drift, limb holds 90 (or 45) degrees for full 10 secs  6a. Motor Leg, Left: 1-->Drift, leg falls by the end of the 5-sec period but does not hit bed  6b. Motor Leg, Right: 0-->No drift, leg holds 30 degree position for full 5 secs  7. Limb Ataxia: 0-->Absent  8. Sensory: 1-->Mild-to-moderate sensory loss, patient feels pinprick is less sharp or is dull on the affected side, or there is a loss of superficial pain with pinprick, but patient is aware of being touched  9. Best Language: 0-->No aphasia, normal  10. Dysarthria: 1-->Mild-to-moderate dysarthria, patient slurs at least some words and, at worst, can be understood with some difficulty  11. Extinction and Inattention (formerly Neglect): 0-->No abnormality    Total (NIH Stroke Scale): 5         Results from last 7 days  Lab Units 07/05/18  1215 07/05/18  1209   WBC 10*3/mm3 5.10  --    HEMOGLOBIN g/dL 11.8  --    HEMOGLOBIN, POC g/dL  --  11.6*   MCV fL 83.9  --    PLATELETS 10*3/mm3 145*  --        Results from last 7 days  Lab Units 07/05/18  1209   CREATININE mg/dL 1.00     Estimated Creatinine Clearance: 43.3 mL/min (by C-G formula based on SCr of 1 mg/dL).    Results from last 7 days  Lab Units 07/05/18  1215   ALT (SGPT) U/L 15   AST  (SGOT) U/L 19         Images:    Imaging Results (last 24 hours)     Procedure Component Value Units Date/Time    CT Cerebral Perfusion With & Without Contrast [[id number]] Collected:  07/05/18 1441     Updated:  07/05/18 1441    Narrative:       EXAMINATION: CT CEREBRAL PERFUSION W WO CONTRAST- 07/05/2018     INDICATION: TIA, initial screening; I63.9-Cerebral infarction,  unspecified; I48.91-Unspecified atrial fibrillation; Z92.82-Status post  administration of TPA (RTPA) in a different facility within the last 24  hours prior to admission to current facility         TECHNIQUE: Cerebral perfusion analysis was performed using computed  tomography with contrast administration including postprocessing of  parametric maps with determination of cerebral blood flow, cerebral  blood volume, and mean transit time.     The radiation dose reduction device was turned on for each scan per the  ALARA (As Low as Reasonably Achievable) protocol.     COMPARISON: Unenhanced head CT scan of same date.      FINDINGS: There is an approximately 8 x 4 cm area of increased mean  transit time and time to drain in the posterior and superior right  frontal lobe corresponding to similar sized area of decreased cerebral  blood flow. There is a small irregular underlying area of decreased  cerebral blood volume approximately 3.5 x 1.5 cm, consistent with small  core infarct. No significant perfusion abnormality is seen elsewhere.       Impression:       8 x 4 cm area of ischemia in the posterior and superior  right frontal lobe, with small underlying core infarct.     D:  07/05/2018  E:  07/05/2018       XR Chest 1 View [[id number]] Collected:  07/05/18 1417     Updated:  07/05/18 1417    Narrative:       EXAMINATION: XR CHEST 1 VW- 07/05/2018     INDICATION: Acute Stroke Protocol (onset < 12 hrs); I63.9-Cerebral  infarction, unspecified; I48.91-Unspecified atrial fibrillation;  Z92.82-Status post administration of tPA (rtPA) in a different  facility  within the last 24 hours prior to admission to current facility      COMPARISON: NONE     FINDINGS: Heart shadow is mildly enlarged. Vasculature is slightly  cephalized. There is perihilar interstitial change, possibly very early  interstitial edema, and minimal left basilar atelectasis. No  pneumothorax is seen.       Impression:       Cardiomegaly and mild pulmonary vascular congestion. Mild  left basilar atelectasis noted.     D:  07/05/2018  E:  07/05/2018          CT Angiogram Neck With & Without Contrast [[id number]] Collected:  07/05/18 1312     Updated:  07/05/18 1312    Narrative:       EXAMINATION: CT ANGIOGRAM NECK W WO CONTRAST- 07/05/2018     INDICATION: Stroke; I63.9-Cerebral infarction, unspecified;  I48.91-Unspecified atrial fibrillation; Z92.82-Status post  administration of TPA (RTPA) in a different facility within the last 24  hours prior to admission to current facility         TECHNIQUE: Pre and postcontrast 0.75 mm axial images through the neck  with sagittal and coronal 2-D reconstructions.     The radiation dose reduction device was turned on for each scan per the  ALARA (As Low as Reasonably Achievable) protocol.     COMPARISON: NONE     FINDINGS: Proximal subclavian arteries appear normally patent.     On the right, no significant common, internal, or external carotid  artery stenosis is seen.     On the left, no significant common, internal or external carotid artery  stenosis is seen.     Vertebral arteries appear to be codominant and normal in appearance.       Impression:       Neck CTA appears within normal limits.     D:  07/05/2018  E:  07/05/2018       CT Head Without Contrast Stroke Protocol [[id number]] Collected:  07/05/18 1253     Updated:  07/05/18 1253    Narrative:       EXAMINATION: CT HEAD WO CONTRAST STROKE PROTOCOL-      INDICATION: Stroke.     TECHNIQUE: 5 mm unenhanced images through the brain.     The radiation dose reduction device was turned on for each scan  per the  ALARA (As Low as Reasonably Achievable) protocol.     COMPARISON: None.     FINDINGS: The calvarium appears intact. Included paranasal sinuses and  mastoids appear clear. Soft tissue window images show mild generalized  cerebral atrophy for patient's age. Some mild patchy white matter  changes are seen in the subcortical white matter of the frontal lobes,  likely chronic. There is no evidence to suggest acute infarction, no  evidence of intracranial hemorrhage, mass or mass effect, hydrocephalus,  or abnormal extraaxial collection.       Impression:       No evidence of acute intracranial disease.     NOTE: Exam time is shown as 11:54 AM. Preliminary report was given to  Dr. Cardenas at 11:57 AM     D:  07/05/2018  E:  07/05/2018       CT Angiogram Head With & Without Contrast [[id number]] Updated:  07/05/18 1239        ECG/EMG Results (last 24 hours)     Procedure Component Value Units Date/Time    ECG 12 Lead [[id number]] Collected:  07/05/18 1223     Updated:  07/05/18 1231    Narrative:       Test Reason : Acute Stroke Protocol (onset < 12 hrs)  Blood Pressure : **/** mmHG  Vent. Rate : 085 BPM     Atrial Rate : 091 BPM     P-R Int : 000 ms          QRS Dur : 106 ms      QT Int : 374 ms       P-R-T Axes : 000 040 053 degrees     QTc Int : 445 ms    Atrial fibrillation  Abnormal ECG  No previous ECGs available  Confirmed by MADELYN BURCIAGA MD ([id number]) on 7/5/2018 12:31:40 PM    Referred By:  PATRICA           Confirmed By:MADELYN BURCIAGA MD          I reviewed the patient's new laboratory and imaging results.  I independently reviewed the patient's new images.    Assessment/Plan   A / P     Ms. Bella is a 89yo F who is admitted with an acute CVA and is s/p TPA. She was found to have new atrial fibrillation which may likely be the cause of her stroke. She is admitted to the ICU for closer monitoring.       Nutrition:   NPO Diet  Advance Directives:   Code Status and Medical Interventions:   Ordered at: 07/05/18 144  "    Code Status:    CPR     Medical Interventions (Level of Support Prior to Arrest):    Full       Hospital Problem List     * (Principal)Acute CVA (cerebrovascular accident) (CMS/[facility])    Hypertension    Hyperlipemia    Coronary artery disease    Paroxysmal atrial fibrillation (CMS/[facility])          Assessment / Plan:    1. Admit to ICU  2. Post-TPA order set.   3. Echocardiogram with bubble.   4. CT head 24 hours post-TPA or for neurologic decline  5. Neurology evaluation.   6. Start a statin.   7. She will likely need long-term anticoagulation after cleared from TPA for new Afib.   8. AM labs    Discussed with the patient and family at bedside.       I discussed the patient's findings and my recommendations with patient, family and nursing staff    Time:   Critical Care Time: was greater than 40 minutes.(excluding time spent on other separately billable procedures or treating other patients).     Anabel Toro DO  Pulmonary and Critical Care Medicine    Electronically signed by Anabel Toro DO at 7/5/2018  2:53 PM       Hospital Medications (all)       Dose Frequency Start End    alteplase (ACTIVASE) 81 mg in sterile water (preservative free) 81 mL (1 mg/mL) infusion 81 mg Once 7/5/2018 7/5/2018    Sig - Route: Infuse 81 mL into a venous catheter 1 (One) Time. - Intravenous    alteplase (ACTIVASE) 9 mg in sterile water (preservative free) 9 mL bolus 9 mg Once 7/5/2018 7/5/2018    Sig - Route: Infuse 9 mL into a venous catheter 1 (One) Time. - Intravenous    aspirin suppository 300 mg 300 mg Daily 7/6/2018     Sig - Route: Insert 1 suppository into the rectum Daily. - Rectal    Linked Group 1:  \"Or\" Linked Group Details        aspirin tablet 325 mg 325 mg Daily 7/6/2018     Sig - Route: Take 1 tablet by mouth Daily. - Oral    Linked Group 1:  \"Or\" Linked Group Details        atorvastatin (LIPITOR) tablet 80 mg 80 mg Nightly 7/5/2018     Sig - Route: Take 2 tablets by mouth Every Night. - Oral    furosemide " (LASIX) tablet 40 mg 40 mg Daily 7/6/2018     Sig - Route: Take 1 tablet by mouth Daily. - Oral    HYDROcodone-acetaminophen (NORCO) 7.5-325 MG per tablet 1 tablet 1 tablet Every 6 Hours PRN 7/5/2018     Sig - Route: Take 1 tablet by mouth Every 6 (Six) Hours As Needed for Moderate Pain . - Oral    iopamidol (ISOVUE-370) 76 % injection 150 mL 150 mL Once in Imaging 7/5/2018 7/5/2018    Sig - Route: Infuse 150 mL into a venous catheter Once. - Intravenous    losartan (COZAAR) tablet 50 mg 50 mg Every 24 Hours Scheduled 7/6/2018     Sig - Route: Take 1 tablet by mouth Daily. - Oral    Morphine PF injection 1 mg 1 mg Every 4 Hours PRN 7/5/2018 7/6/2018    Sig - Route: Infuse 1 mL into a venous catheter Every 4 (Four) Hours As Needed for Severe Pain . - Intravenous    niCARdipine (CARDENE-IV) 20 mg/200 mL (0.1 mg/mL) in 0.9% NaCl infusion 5-15 mg/hr Titrated 7/5/2018     Sig - Route: Infuse 5-15 mg/hr into a venous catheter Dose Adjusted By Provider As Needed. - Intravenous    polyethylene glycol 3350 powder (packet) 17 g Daily 7/5/2018     Sig - Route: Take 17 g by mouth Daily. - Oral    sodium chloride 0.9 % flush 1-10 mL 1-10 mL As Needed 7/5/2018     Sig - Route: Infuse 1-10 mL into a venous catheter As Needed for Line Care. - Intravenous    sodium chloride 0.9 % flush 10 mL 10 mL As Needed 7/5/2018     Sig - Route: Infuse 10 mL into a venous catheter As Needed for Line Care. - Intravenous    Cosign for Ordering: Accepted by Nick Hannon MD on 7/5/2018  4:02 PM    sodium chloride 0.9 % infusion 100 mL 100 mL Once 7/5/2018 7/5/2018    Sig - Route: Infuse 100 mL into a venous catheter 1 (One) Time. - Intravenous    Sulfur Hexafluoride Microsph 60.7-25 MG reconstituted suspension 2 mL 2 mL Once 7/5/2018 7/5/2018    Sig - Route: Infuse 2 mL into a venous catheter 1 (One) Time. - Intravenous    aspirin suppository 300 mg (Discontinued) 300 mg Daily 7/6/2018 7/6/2018    Sig - Route: Insert 1 suppository into the rectum  "Daily. - Rectal    Linked Group 1:  \"Or\" Linked Group Details        aspirin tablet 325 mg (Discontinued) 325 mg Daily 2018    Sig - Route: Take 1 tablet by mouth Daily. - Oral    Linked Group 1:  \"Or\" Linked Group Details                 Physical Therapy Notes (most recent note)      Kirstie Shipley, PT at 2018 11:00 AM  Version 1 of 1         Acute Care - Physical Therapy Initial Evaluation  [facility]     Patient Name: Cheyanne Bella  : 1926  MRN: [id number]  Today's Date: 2018   Onset of Illness/Injury or Date of Surgery: 18  Date of Referral to PT: 18  Referring Physician: DO Cortez      Admit Date: 2018    Visit Dx:     ICD-10-CM ICD-9-CM   1. Acute CVA (cerebrovascular accident) (CMS/HCC) I63.9 434.91   2. Atrial fibrillation, unspecified type (CMS/HCC) I48.91 427.31   3. Received intravenous tissue plasminogen activator (t-PA) in emergency department Z92.82 V45.88   4. Dysphagia, unspecified type R13.10 787.20   5. Impaired mobility and ADLs Z74.09 799.89   6. Impaired functional mobility, balance, gait, and endurance Z74.09 V49.89     Patient Active Problem List   Diagnosis   • Basal cell carcinoma   • Acute CVA (cerebrovascular accident) (CMS/HCC)   • Hypertension   • Hyperlipemia   • Coronary artery disease   • Paroxysmal atrial fibrillation (CMS/HCC)     Past Medical History:   Diagnosis Date   • Colon cancer (CMS/HCC)    • Coronary artery disease    • Hyperlipemia    • Hypertension      Past Surgical History:   Procedure Laterality Date   • COLON SURGERY     • COLOSTOMY     • CORONARY STENT PLACEMENT     • HERNIA REPAIR     • HYSTERECTOMY     • SKIN CANCER EXCISION          PT ASSESSMENT (last 12 hours)      Physical Therapy Evaluation     Row Name 18 0845          PT Evaluation Time/Intention    Subjective Information complains of;pain  -LS     Document Type evaluation  -LS     Mode of Treatment physical therapy  -LS     Patient Effort good  -LS     " Symptoms Noted During/After Treatment fatigue  -     Row Name 07/06/18 0845          General Information    Patient Profile Reviewed? yes  -LS     Onset of Illness/Injury or Date of Surgery 07/05/18  -LS     Referring Physician Case, DO  -LS     Prior Level of Function independent:;ADL's;all household mobility;bathing;dressing  -LS     Equipment Currently Used at Home rollator;shower chair;commode, bedside;power chair, (recliner lift)  -LS     Pertinent History of Current Functional Problem To ED via EMS with L facial droop, slurred speech, weakness. S/p tPA. CT perf demonstrated R frontal ischemia. Found to have afib.   -LS     Existing Precautions/Restrictions fall;other (see comments)   L weakness  -LS     Risks Reviewed patient:;LOB;increased discomfort;dizziness  -LS     Benefits Reviewed patient:;improve function;increase independence;increase strength;increase knowledge  -     Barriers to Rehab none identified  -     Row Name 07/06/18 0845          Relationship/Environment    Lives With alone  -     Row Name 07/06/18 0845          Cognitive Assessment/Interventions    Additional Documentation Cognitive Assessment/Intervention (Group)  -     Row Name 07/06/18 0845          Cognitive Assessment/Intervention- PT/OT    Affect/Mental Status (Cognitive) WFL  -LS     Orientation Status (Cognition) oriented x 4  -LS     Follows Commands (Cognition) follows one step commands;over 90% accuracy;verbal cues/prompting required;physical/tactile prompts required  -     Safety Deficit (Cognitive) mild deficit;insight into deficits/self awareness;safety precautions awareness  -     Personal Safety Interventions fall prevention program maintained;gait belt;nonskid shoes/slippers when out of bed  -     Row Name 07/06/18 0845          Safety Issues, Functional Mobility    Impairments Affecting Function (Mobility) balance;coordination;endurance/activity tolerance;pain;strength;motor control  -     Row Name  07/06/18 0845          Bed Mobility Assessment/Treatment    Comment (Bed Mobility) sitting EOB upon arrival  -     Row Name 07/06/18 0845          Transfer Assessment/Treatment    Transfer Assessment/Treatment sit-stand transfer;stand-sit transfer  -LS     Comment (Transfers) Performed STS from EOB with PT/tech on either side of pt for safety.   -LS     Sit-Stand [place] (Transfers) moderate assist (50% patient effort);2 person assist;verbal cues  -LS     Stand-Sit [place] (Transfers) moderate assist (50% patient effort);2 person assist;verbal cues  -     Row Name 07/06/18 0845          Gait/Stairs Assessment/Training    Gait/Stairs Assessment/Training gait/ambulation assistive device  -LS     [place] Level (Gait) moderate assist (50% patient effort);2 person assist;verbal cues  -LS     Distance in Feet (Gait) 2  -LS     Deviations/Abnormal Patterns (Gait) stride length decreased;gait speed decreased  -LS     Comment (Gait/Stairs) EOB to recliner only; pt able to advance LEs independently with no noted knee buckling. VC's for upright posture and full  foot clearance.   -     Row Name 07/06/18 0845          General ROM    GENERAL ROM COMMENTS BLE grossly WFL  -     Row Name 07/06/18 0845          MMT (Manual Muscle Testing)    Additional Documentation General Assessment (Manual Muscle Testing) (Group)  -     Row Name 07/06/18 0845          General Assessment (Manual Muscle Testing)    Comment, General Manual Muscle Testing (MMT) Assessment RLE grossly 4-/5, LLE grossly 3+/5  -     Row Name 07/06/18 0845          Motor Assessment/Intervention    Additional Documentation Balance (Group);Therapeutic Exercise (Group)  -     Row Name 07/06/18 0845          Therapeutic Exercise    [id number] - PT Therapeutic Activity Minutes 8  -     Row Name 07/06/18 0845          Balance    Balance static sitting balance;static standing balance  -     Row Name 07/06/18 0845          Static Sitting Balance     Level of [place] (Unsupported Sitting, Static Balance) minimal assist, 75% patient effort  -LS     Sitting Position (Unsupported Sitting, Static Balance) sitting on edge of bed  -     Row Name 07/06/18 0845          Static Standing Balance    Level of [place] (Supported Standing, Static Balance) moderate assist, 50 to 74% patient effort  -LS     Assistive Device Utilized (Supported Standing, Static Balance) --   BUE support via PT/tech  -     Row Name 07/06/18 0845          Sensory Assessment/Intervention    Sensory General Assessment no sensation deficits identified   BLEs grossly WFL  -     Row Name 07/06/18 0845          Pain Assessment    Additional Documentation Pain Scale: Numbers Pre/Post-Treatment (Group)  -     Row Name 07/06/18 0845          Pain Scale: Numbers Pre/Post-Treatment    Pain Scale: Numbers, Pretreatment 3/10  -LS     Pain Scale: Numbers, Post-Treatment 3/10  -LS     Pain Location - Side Right  -LS     Pain Location hip  -LS     Pre/Post Treatment Pain Comment tolerated  -LS     Pain Intervention(s) Ambulation/increased activity;Repositioned  -     Row Name 07/06/18 0845          Plan of Care Review    Plan of Care Reviewed With patient  -     Row Name 07/06/18 0845          Physical Therapy Clinical Impression    Date of Referral to PT 07/05/18  -     PT Diagnosis (PT Clinical Impression) impaired functional mobility, balance, gait  -LS     Patient/Family Goals Statement (PT Clinical Impression) return to PLOF  -     Criteria for Skilled Interventions Met (PT Clinical Impression) yes;treatment indicated  -     Rehab Potential (PT Clinical Summary) good, to achieve stated therapy goals  -     Care Plan Review (PT) evaluation/treatment results reviewed  -     Row Name 07/06/18 0845          Vital Signs    Pre Systolic BP Rehab 151  -LS     Pre Treatment Diastolic BP 68  -LS     Post Systolic BP Rehab 170  -LS     Post Treatment Diastolic BP 78  -LS      Pretreatment Heart Rate (beats/min) --   with OT  -LS     Posttreatment Heart Rate (beats/min) 96  -LS     O2 Delivery Pre Treatment room air  -LS     Post SpO2 (%) 93  -LS     O2 Delivery Post Treatment room air  -LS     Pre Patient Position Sitting  -LS     Intra Patient Position Standing  -LS     Post Patient Position Sitting  -LS     Row Name 07/06/18 0845          Physical Therapy Goals    Bed Mobility Goal Selection (PT) bed mobility, PT goal 1  -LS     Transfer Goal Selection (PT) transfer, PT goal 1  -LS     Gait Training Goal Selection (PT) gait training, PT goal 1  -     Row Name 07/06/18 0845          Bed Mobility Goal 1 (PT)    Activity/Assistive Device (Bed Mobility Goal 1, PT) sit to supine/supine to sit  -LS     [place] Level/Cues Needed (Bed Mobility Goal 1, PT) minimum assist (75% or more patient effort)  -LS     Time Frame (Bed Mobility Goal 1, PT) 2 weeks  -LS     Progress/Outcomes (Bed Mobility Goal 1, PT) goal ongoing  -     Row Name 07/06/18 0845          Transfer Goal 1 (PT)    Activity/Assistive Device (Transfer Goal 1, PT) sit-to-stand/stand-to-sit;bed-to-chair/chair-to-bed;walker, rolling  -LS     [place] Level/Cues Needed (Transfer Goal 1, PT) contact guard assist  -LS     Time Frame (Transfer Goal 1, PT) 2 weeks  -LS     Progress/Outcome (Transfer Goal 1, PT) goal ongoing  -     Row Name 07/06/18 0845          Gait Training Goal 1 (PT)    Activity/Assistive Device (Gait Training Goal 1, PT) assistive device use;walker, rolling  -LS     [place] Level (Gait Training Goal 1, PT) minimum assist (75% or more patient effort)  -LS     Distance (Gait Goal 1, PT) 50  -LS     Time Frame (Gait Training Goal 1, PT) 2 weeks  -LS     Progress/Outcome (Gait Training Goal 1, PT) goal ongoing  -     Row Name 07/06/18 0845          Positioning and Restraints    Pre-Treatment Position in bed  -LS     Post Treatment Position chair  -LS     In Chair notified nsg;reclined;call light  within reach;encouraged to call for assist;exit alarm on;RUE elevated;LUE elevated;waffle cushion;on mechanical lift sling;legs elevated;heels elevated  -     Row Name 07/06/18 0845          Living Environment    Home Accessibility --   walk-in shower  -       User Key  (r) = Recorded By, (t) = Taken By, (c) = Cosigned By    Initials Name Provider Type    LS Kirstie Shipley, PT Physical Therapist          Physical Therapy Education     Title: PT OT SLP Therapies (Active)     Topic: Physical Therapy (Active)     Point: Mobility training (Active)    Learning Progress Summary     Learner Status Readiness Method Response Comment Documented by    Patient Active Acceptance E,D NR  LS 07/06/18 1055          Point: Body mechanics (Active)    Learning Progress Summary     Learner Status Readiness Method Response Comment Documented by    Patient Active Acceptance E,D NR  LS 07/06/18 1055          Point: Precautions (Active)    Learning Progress Summary     Learner Status Readiness Method Response Comment Documented by    Patient Active Acceptance E,D NR  LS 07/06/18 1055                      User Key     Initials Effective Dates Name Provider Type [facility] 06/19/15 -  Kirstie Shipley, PT Physical Therapist PT                PT Recommendation and Plan  Anticipated Discharge Disposition (PT): inpatient rehabilitation facility  Planned Therapy Interventions (PT Eval): balance training, bed mobility training, gait training, home exercise program, strengthening, transfer training, patient/family education  Therapy Frequency (PT Clinical Impression): daily  Outcome Summary/Treatment Plan (PT)  Anticipated Discharge Disposition (PT): inpatient rehabilitation facility  Plan of Care Reviewed With: patient  Outcome Summary: PT evaluation completed. Pt demonstrates LLE weakness, balance deficits, and decreased indep re: functional mobility with evolving signs/symptoms, warranting further skilled PT services to promote PLOF.  Limited today by fatigue. Recommend IP rehab at d/c.           Outcome Measures     Row Name 07/06/18 0845 07/06/18 0827          How much help from another person do you currently need...    Turning from your back to your side while in flat bed without using bedrails? 3  -LS  --     Moving from lying on back to sitting on the side of a flat bed without bedrails? 2  -LS  --     Moving to and from a bed to a chair (including a wheelchair)? 2  -LS  --     Standing up from a chair using your arms (e.g., wheelchair, bedside chair)? 2  -LS  --     Climbing 3-5 steps with a railing? 1  -LS  --     To walk in hospital room? 2  -LS  --     AM-PAC 6 Clicks Score 12  -LS  --        How much help from another is currently needed...    Putting on and taking off regular lower body clothing?  -- 1  -CL     Bathing (including washing, rinsing, and drying)  -- 2  -CL     Toileting (which includes using toilet bed pan or urinal)  -- 1  -CL     Putting on and taking off regular upper body clothing  -- 2  -CL     Taking care of personal grooming (such as brushing teeth)  -- 2  -CL     Eating meals  -- 3  -CL     Score  -- 11  -CL        Modified Johnson Scale    Modified Johnson Scale 4 - Moderately severe disability.  Unable to walk without assistance, and unable to attend to own bodily needs without assistance.  -LS 4 - Moderately severe disability.  Unable to walk without assistance, and unable to attend to own bodily needs without assistance.  -CL        Functional Assessment    Outcome Measure Options AM-PAC 6 Clicks Basic Mobility (PT)  -LS AM-PAC 6 Clicks Daily Activity (OT);Modified Jan  -CL       User Key  (r) = Recorded By, (t) = Taken By, (c) = Cosigned By    Initials Name Provider Type    LS Kirstie Shipley, PT Physical Therapist    CL Iman Perrin, OT Occupational Therapist           Time Calculation:         PT Charges     Row Name 07/06/18 0845             Time Calculation    Start Time 0845  -      PT Received On  18  -LS      PT Goal Re-Cert Due Date 18  -LS         Time Calculation- PT    Total Timed Code Minutes- PT 8 minute(s)  -LS         Timed Charges    20978 - PT Therapeutic Activity Minutes 8  -LS        User Key  (r) = Recorded By, (t) = Taken By, (c) = Cosigned By    Initials Name Provider Type    LS Kirstie Shipley, PT Physical Therapist        Therapy Suggested Charges     Code   Minutes Charges    73993 (CPT®) Hc Pt Neuromusc Re Education Ea 15 Min      73748 (CPT®) Hc Pt Ther Proc Ea 15 Min      78684 (CPT®) Hc Gait Training Ea 15 Min      77394 (CPT®) Hc Pt Therapeutic Act Ea 15 Min 8 1    75520 (CPT®) Hc Pt Manual Therapy Ea 15 Min      31280 (CPT®) Hc Pt Iontophoresis Ea 15 Min      32497 (CPT®) Hc Pt Elec Stim Ea-Per 15 Min      03535 (CPT®) Hc Pt Ultrasound Ea 15 Min      76743 (CPT®) Hc Pt Self Care/Mgmt/Train Ea 15 Min      Total  8 1        Therapy Charges for Today     Code Description Service Date Service Provider Modifiers Qty    59734540825 HC PT EVAL MOD COMPLEXITY 3 2018 Kirstie Shipley, PT GP 1    20231094150 HC PT THERAPEUTIC ACT EA 15 MIN 2018 Kirstie Shipley, PT GP 1    26660450222 HC PT THER SUPP EA 15 MIN 2018 Kristie Shipley, PT GP 1          PT G-Codes  Outcome Measure Options: AM-PAC 6 Clicks Basic Mobility (PT)      Kirstie Shipley, PT  2018         Electronically signed by Kirstie Shipley, PT at 2018 11:00 AM          Occupational Therapy Notes (most recent note)      Iman Perrin, OT at 2018  9:32 AM          Acute Care - Occupational Therapy Initial Evaluation  [facility]     Patient Name: Cheyanne Bella  : 1926  MRN: [id number]  Today's Date: 2018  Onset of Illness/Injury or Date of Surgery: 18  Date of Referral to OT: 18  Referring Physician: DO Cortez    Admit Date: 2018       ICD-10-CM ICD-9-CM   1. Acute CVA (cerebrovascular accident) (CMS/[facility]) I63.9 434.91   2. Atrial fibrillation, unspecified type (CMS/HCC) I48.91 427.31   3.  Received intravenous tissue plasminogen activator (t-PA) in emergency department Z92.82 V45.88   4. Dysphagia, unspecified type R13.10 787.20   5. Impaired mobility and ADLs Z74.09 799.89     Patient Active Problem List   Diagnosis   • Basal cell carcinoma   • Acute CVA (cerebrovascular accident) (CMS/HCC)   • Hypertension   • Hyperlipemia   • Coronary artery disease   • Paroxysmal atrial fibrillation (CMS/HCC)     Past Medical History:   Diagnosis Date   • Colon cancer (CMS/HCC)    • Coronary artery disease    • Hyperlipemia    • Hypertension      Past Surgical History:   Procedure Laterality Date   • COLON SURGERY     • COLOSTOMY     • CORONARY STENT PLACEMENT     • HERNIA REPAIR     • HYSTERECTOMY     • SKIN CANCER EXCISION            OT ASSESSMENT FLOWSHEET (last 72 hours)      Occupational Therapy Evaluation     Row Name 07/06/18 0827                   OT Evaluation Time/Intention    Subjective Information complains of;pain;fatigue  -CL        Document Type evaluation  -CL        Mode of Treatment occupational therapy  -CL        Patient Effort good  -CL        Symptoms Noted During/After Treatment fatigue  -CL           General Information    Patient Profile Reviewed? yes  -CL        Onset of Illness/Injury or Date of Surgery 07/05/18  -CL        Referring Physician Case, DO  -CL        Prior Level of Function independent:;all household mobility;transfer;ADL's;dressing;bathing  -CL        Equipment Currently Used at Home rollator;shower chair  -CL        Pertinent History of Current Functional Problem Pt presented to the ED 2/2 to L sided weakness. CTP (+) M2 occlusion and small core infarct. Pt s/p tPA.   -CL        Existing Precautions/Restrictions fall;other (see comments)   L sided weakness  -CL        Limitations/Impairments safety/cognitive  -CL        Risks Reviewed patient:;nausea/vomiting;LOB;dizziness;increased discomfort;change in vital signs;lines disloged  -CL        Benefits Reviewed  patient:;improve function;increase independence;increase strength;increase balance;decrease pain;increase knowledge  -CL        Barriers to Rehab none identified  -CL           Relationship/Environment    Lives With alone  -CL           Resource/Environmental Concerns    Current Living Arrangements independent/assisted living facility  -CL           Cognitive Assessment/Intervention- PT/OT    Affect/Mental Status (Cognitive) WFL  -CL        Orientation Status (Cognition) oriented x 4  -CL        Follows Commands (Cognition) follows one step commands;over 90% accuracy;verbal cues/prompting required;repetition of directions required  -CL        Cognitive Function (Cognitive) safety deficit  -CL        Safety Deficit (Cognitive) mild deficit;awareness of need for assistance;safety precautions awareness  -CL        Personal Safety Interventions fall prevention program maintained;gait belt;nonskid shoes/slippers when out of bed  -CL           Bed Mobility Assessment/Treatment    Bed Mobility Assessment/Treatment supine-sit  -CL        Supine-Sit [place] (Bed Mobility) maximum assist (25% patient effort);2 person assist;verbal cues  -CL        Assistive Device (Bed Mobility) head of bed elevated;draw sheet;bed rails  -CL           Functional Mobility    Functional Mobility- Comment Defer to PT.   -CL           Transfer Assessment/Treatment    Comment (Transfers) Defer to PT.   -CL           ADL Assessment/Intervention    BADL Assessment/Intervention lower body dressing  -CL           Lower Body Dressing Assessment/Training    Lower Body Dressing [place] Level don;socks;dependent (less than 25% patient effort)  -CL        Lower Body Dressing Position supine  -CL           General ROM    GENERAL ROM COMMENTS [place] whittenr FE ~120, remainder grossly WFL.   -CL           General Assessment (Manual Muscle Testing)    Comment, General Manual Muscle Testing (MMT) Assessment RUE grossly 4-/5. EDER daly FE 3-/5, bicep/tricep  3+/5,  2/5  -CL           Motor Assessment/Interventions    Additional Documentation Balance (Group);Gross Motor Coordination (Group)  -CL           Gross Motor Coordination    Gross Motor Impairments finger to nose  -CL        Gross Motor Skill, Impairments Detail LUE moderately impaired.   -CL           Balance    Balance static sitting balance  -CL           Static Sitting Balance    Level of [place] (Unsupported Sitting, Static Balance) moderate assist, 50 to 74% patient effort   progressed to CGA at EOB  -CL        Sitting Position (Unsupported Sitting, Static Balance) sitting on edge of bed  -CL           Sensory Assessment/Intervention    Sensory General Assessment no sensation deficits identified  -CL           Positioning and Restraints    Pre-Treatment Position in bed  -CL        Post Treatment Position bed  -CL        In Bed notified nsg;sitting EOB;call light within reach;encouraged to call for assist;with PT   transitioned to PT treatment  -CL           Pain Assessment    Additional Documentation Pain Scale 2: FACES Pre/Post-Treatment (Group)  -CL           Pain Scale 2: FACES Pre/Post-Treatment    Pain 2: FACES Scale, Pretreatment 2-->hurts little bit  -CL        Pain 2: FACES Scale, Post-Treatment 2-->hurts little bit  -CL        Pain Location 2 - Side Right  -CL        Pain Location 2 hip  -CL        Pre/Post Treatment Pain 2 Comment Tolerated.   -CL        Pain Intervention(s) 2 Repositioned;Ambulation/increased activity  -CL           Clinical Impression (OT)    Date of Referral to OT 07/05/18  -CL        OT Diagnosis Decreased independence in ADLs.   -CL        Patient/Family Goals Statement (OT Eval) Return to PLOF.   -CL        Criteria for Skilled Therapeutic Interventions Met (OT Eval) yes;treatment indicated  -CL        Rehab Potential (OT Eval) good, to achieve stated therapy goals  -CL        Therapy Frequency (OT Eval) daily  -CL        Anticipated Equipment Needs at Discharge  (OT) --   TBA further  -CL        Anticipated Discharge Disposition (OT) skilled nursing facility  -CL           Vital Signs    Pre Systolic BP Rehab 151  -CL        Pre Treatment Diastolic BP 65  -CL        Post Systolic BP Rehab 151  -CL        Post Treatment Diastolic BP 68  -CL        Pretreatment Heart Rate (beats/min) 96  -CL        Posttreatment Heart Rate (beats/min) 104  -CL        Pre SpO2 (%) 91  -CL        O2 Delivery Pre Treatment room air  -CL        Post SpO2 (%) 92  -CL        O2 Delivery Post Treatment room air  -CL        Pre Patient Position Supine  -CL        Intra Patient Position Sitting  -CL        Post Patient Position Sitting  -CL           OT Goals    Transfer Goal Selection (OT) transfer, OT goal 1  -CL        Dressing Goal Selection (OT) dressing, OT goal 1  -CL        Grooming Goal Selection (OT) grooming, OT goal 1  -CL        Strength Goal Selection (OT) strength, OT goal 1  -CL        Additional Documentation Grooming Goal Selection (OT) (Row);Strength Goal Selection (OT) (Row)  -CL           Transfer Goal 1 (OT)    Activity/Assistive Device (Transfer Goal 1, OT) sit-to-stand/stand-to-sit;toilet  -CL        [place] Level/Cues Needed (Transfer Goal 1, OT) moderate assist (50-74% patient effort);verbal cues required  -CL        Time Frame (Transfer Goal 1, OT) by discharge;long term goal (LTG)  -CL        Progress/Outcome (Transfer Goal 1, OT) goal ongoing  -CL           Dressing Goal 1 (OT)    Activity/Assistive Device (Dressing Goal 1, OT) upper body dressing   Utilizing cas-dressing technique  -CL        [place]/Cues Needed (Dressing Goal 1, OT) minimum assist (75% or more patient effort);verbal cues required  -CL        Time Frame (Dressing Goal 1, OT) by discharge;long term goal (LTG)  -CL        Progress/Outcome (Dressing Goal 1, OT) goal ongoing  -CL           Grooming Goal 1 (OT)    Activity/Device (Grooming Goal 1, OT) wash face, hands;hair care   sitting EOB  -CL         [place] (Grooming Goal 1, OT) minimum assist (75% or more patient effort);verbal cues required  -CL        Time Frame (Grooming Goal 1, OT) by discharge;long term goal (LTG)  -CL        Progress/Outcome (Grooming Goal 1, OT) goal ongoing  -CL           Strength Goal 1 (OT)    Strength Goal 1 (OT) Pt will tolerate LUE AROM HEP x10 reps to promote ADL performance.   -CL        Time Frame (Strength Goal 1, OT) by discharge;long term goal (LTG)  -CL        Progress/Outcome (Strength Goal 1, OT) goal ongoing  -CL           Living Environment    Home Accessibility --   walk-in shower  -CL          User Key  (r) = Recorded By, (t) = Taken By, (c) = Cosigned By    Initials Name Effective Dates    CL Iman Perrin OT 04/03/18 -            Occupational Therapy Education     Title: PT OT SLP Therapies (Active)     Topic: Occupational Therapy (Active)     Point: ADL training (Active)     Description: Instruct learner(s) on proper safety adaptation and remediation techniques during self care or transfers.   Instruct in proper use of assistive devices.   Learning Progress Summary     Learner Status Readiness Method Response Comment Documented by    Patient Active Acceptance E NR Pt educated on appropriate safety precautions, t/f techniques, positioning, and role of OT. CL 07/06/18 0929          Point: Precautions (Active)     Description: Instruct learner(s) on prescribed precautions during self-care and functional transfers.   Learning Progress Summary     Learner Status Readiness Method Response Comment Documented by    Patient Active Acceptance E NR Pt educated on appropriate safety precautions, t/f techniques, positioning, and role of OT.  07/06/18 0929          Point: Body mechanics (Active)     Description: Instruct learner(s) on proper positioning and spine alignment during self-care, functional mobility activities and/or exercises.   Learning Progress Summary     Learner Status Readiness Method Response Comment  Documented by    Patient Active Acceptance E NR Pt educated on appropriate safety precautions, t/f techniques, positioning, and role of OT. CL 07/06/18 0929                      User Key     Initials Effective Dates Name Provider Type Discipline     04/03/18 -  Iman Perrin OT Occupational Therapist OT                  OT Recommendation and Plan  Outcome Summary/Treatment Plan (OT)  Anticipated Equipment Needs at Discharge (OT):  (TBA further)  Anticipated Discharge Disposition (OT): skilled nursing facility  Therapy Frequency (OT Eval): daily  Plan of Care Review  Plan of Care Reviewed With: patient  Plan of Care Reviewed With: patient  Outcome Summary: OT completed a brief chart review relating to presenting physical deficis. Pt Max Ax2 for bed mobility, limited d/t L sided weakness. Recommend cont skilled IPOT intervention. Recommend pt DC to SNF.           Outcome Measures     Row Name 07/06/18 0827             How much help from another is currently needed...    Putting on and taking off regular lower body clothing? 1  -CL      Bathing (including washing, rinsing, and drying) 2  -CL      Toileting (which includes using toilet bed pan or urinal) 1  -CL      Putting on and taking off regular upper body clothing 2  -CL      Taking care of personal grooming (such as brushing teeth) 2  -CL      Eating meals 3  -CL      Score 11  -CL         Modified [place] Scale    Modified [place] Scale 4 - Moderately severe disability.  Unable to walk without assistance, and unable to attend to own bodily needs without assistance.  -CL         Functional Assessment    Outcome Measure Options AM-PAC 6 Clicks Daily Activity (OT);Modified [place]  -CL        User Key  (r) = Recorded By, (t) = Taken By, (c) = Cosigned By    Initials Name Provider Type    CL Iman Perrin OT Occupational Therapist          Time Calculation:   OT Start Time: 0827  Therapy Suggested Charges     Code   Minutes Charges    None           Therapy Charges for  Today     Code Description Service Date Service Provider Modifiers Qty    01008190677 HC OT EVAL LOW COMPLEXITY 4 7/6/2018 Iman Perrin OT GO 1    22774467400 HC OT THER SUPP EA 15 MIN 7/6/2018 Iman Perrin OT GO 1               Iman Perrin OT  7/6/2018    Electronically signed by Iman Perrin OT at 7/6/2018  9:32 AM

## 2018-07-06 NOTE — PROGRESS NOTES
Adult Nutrition  Assessment/PES    Patient Name:  Cheyanne Bella  YOB: 1926  MRN: [id number]  Admit Date:  7/5/2018    Assessment Date:  7/6/2018    Comments:  Pt interviewed ; menu adjusted for preferences, questions answered  Re: diet /dysphagia. RD will for adequacy of po intake.          Reason for Assessment     Row Name 07/06/18 132          Reason for Assessment    Reason For Assessment identified at risk by screening criteria;per organizational policy   MDR; screen/dysphagia  30 mins     Diagnosis neurologic conditions;cardiac disease   Pt adm w/ acute CVA s/p tPA - R parietal lobe, dysphagia; new afib Hx: HTN,HLP, CAAD s/p stent, Colon CA  s/p resection w/ colostomy; chr hip and back pain     Identified At Risk by Screening Criteria difficulty chewing/swallowing             Nutrition/Diet History     Row Name 07/06/18 1326          Nutrition/Diet History    Factors Affecting Nutritional Intake difficulty/impaired swallowing;chewing difficulties/inability to chew food   Pt and family report she does not eat pork, allowed to have 1 ice chip at a time.  Pt is okay w/ pureed diet consistenty, they understand diet rationale and state plans for speech /swallowing therapy.               Labs/Tests/Procedures/Meds     Row Name 07/06/18 1333          Labs/Procedures/Meds    Lab Results Reviewed reviewed, pertinent        Diagnostic Tests/Procedures    Diagnostic Test/Procedure Reviewed reviewed, pertinent     Diagnostic Test/Procedures Comments SLP swallow evaluation - moderate- severe oral pharyngeal dyspahagia        Medications    Pertinent Medications Reviewed reviewed, pertinent             Physical Findings     Row Name 07/06/18 1338          Physical Findings    Overall Physical Appearance obese               Nutrition Prescription Ordered     Row Name 07/06/18 1334          Nutrition Prescription PO    Current PO Diet Dysphagia     Dysphagia Level 2  Pureed     Fluid Consistency Thin      Common Modifiers Cardiac               Problem/Interventions:        Problem 1     Row Name 07/06/18 1331          Nutrition Diagnoses Problem 1    Problem 1 Swallowing Difficulty     Etiology (related to) Medical Diagnosis     Neurological CVA;Dysphagia     Signs/Symptoms (evidenced by) SLP/Swallow eval     Swallow eval status Done     Type of SLP Evaluation --   FEES                     Intervention Goal     Row Name 07/06/18 1337          Intervention Goal    General Meet nutritional needs for age/condition     PO Establish PO;Tolerate PO;Modify texture/consistency             Nutrition Intervention     Row Name 07/06/18 1339          Nutrition Intervention    RD/Tech Action Advise alternate selection;Interview for preference;Menu adjusted;Follow Tx progress;Care plan reviewd;Await begin PO               Education/Evaluation     Row Name 07/06/18 1330          Monitor/Evaluation    Monitor Per protocol;I&O;PO intake;Pertinent labs;Symptoms         Electronically signed by:  Mandie Almonte MS,RD,LD  07/06/18 1:39 PM

## 2018-07-06 NOTE — CONSULTS
Patient does not meet diabetes education order criteria, Current A1C is 5.6% therefore patient was not seen for diabetes education at this time.  Please re consult as needed. Thank you.

## 2018-07-06 NOTE — PLAN OF CARE
Problem: Patient Care Overview  Goal: Plan of Care Review  Outcome: Ongoing (interventions implemented as appropriate)   07/06/18 0928   Coping/Psychosocial   Plan of Care Reviewed With patient   Coping/Psychosocial   Patient Agreement with Plan of Care agrees   OTHER   Outcome Summary OT completed a brief chart review relating to presenting physical deficis. Pt Max Ax2 for bed mobility, limited d/t L sided weakness. Recommend cont skilled IPOT intervention. Recommend pt DC to SNF.

## 2018-07-07 LAB
ANION GAP SERPL CALCULATED.3IONS-SCNC: 7 MMOL/L (ref 3–11)
BUN BLD-MCNC: 20 MG/DL (ref 9–23)
BUN/CREAT SERPL: 18.7 (ref 7–25)
CALCIUM SPEC-SCNC: 9.1 MG/DL (ref 8.7–10.4)
CHLORIDE SERPL-SCNC: 106 MMOL/L (ref 99–109)
CO2 SERPL-SCNC: 29 MMOL/L (ref 20–31)
CREAT BLD-MCNC: 1.07 MG/DL (ref 0.6–1.3)
DEPRECATED RDW RBC AUTO: 42.2 FL (ref 37–54)
ERYTHROCYTE [DISTWIDTH] IN BLOOD BY AUTOMATED COUNT: 13.7 % (ref 11.3–14.5)
GFR SERPL CREATININE-BSD FRML MDRD: 48 ML/MIN/1.73
GFR SERPL CREATININE-BSD FRML MDRD: 58 ML/MIN/1.73
GLUCOSE BLD-MCNC: 114 MG/DL (ref 70–100)
GLUCOSE BLDC GLUCOMTR-MCNC: 117 MG/DL (ref 70–130)
GLUCOSE BLDC GLUCOMTR-MCNC: 126 MG/DL (ref 70–130)
HCT VFR BLD AUTO: 35.9 % (ref 34.5–44)
HGB BLD-MCNC: 11.6 G/DL (ref 11.5–15.5)
MAGNESIUM SERPL-MCNC: 2.2 MG/DL (ref 1.3–2.7)
MCH RBC QN AUTO: 27.7 PG (ref 27–31)
MCHC RBC AUTO-ENTMCNC: 32.3 G/DL (ref 32–36)
MCV RBC AUTO: 85.7 FL (ref 80–99)
PHOSPHATE SERPL-MCNC: 2.9 MG/DL (ref 2.4–5.1)
PLATELET # BLD AUTO: 162 10*3/MM3 (ref 150–450)
PMV BLD AUTO: 9.4 FL (ref 6–12)
POTASSIUM BLD-SCNC: 3.8 MMOL/L (ref 3.5–5.5)
RBC # BLD AUTO: 4.19 10*6/MM3 (ref 3.89–5.14)
SODIUM BLD-SCNC: 142 MMOL/L (ref 132–146)
WBC NRBC COR # BLD: 8.75 10*3/MM3 (ref 3.5–10.8)

## 2018-07-07 PROCEDURE — 84100 ASSAY OF PHOSPHORUS: CPT | Performed by: INTERNAL MEDICINE

## 2018-07-07 PROCEDURE — 83735 ASSAY OF MAGNESIUM: CPT | Performed by: INTERNAL MEDICINE

## 2018-07-07 PROCEDURE — 82962 GLUCOSE BLOOD TEST: CPT

## 2018-07-07 PROCEDURE — 85027 COMPLETE CBC AUTOMATED: CPT | Performed by: INTERNAL MEDICINE

## 2018-07-07 PROCEDURE — 92526 ORAL FUNCTION THERAPY: CPT

## 2018-07-07 PROCEDURE — 99232 SBSQ HOSP IP/OBS MODERATE 35: CPT | Performed by: PSYCHIATRY & NEUROLOGY

## 2018-07-07 PROCEDURE — 99233 SBSQ HOSP IP/OBS HIGH 50: CPT | Performed by: INTERNAL MEDICINE

## 2018-07-07 PROCEDURE — 80048 BASIC METABOLIC PNL TOTAL CA: CPT | Performed by: INTERNAL MEDICINE

## 2018-07-07 RX ADMIN — ASPIRIN 325 MG ORAL TABLET 325 MG: 325 PILL ORAL at 08:50

## 2018-07-07 RX ADMIN — FUROSEMIDE 40 MG: 40 TABLET ORAL at 08:51

## 2018-07-07 RX ADMIN — LOSARTAN POTASSIUM 50 MG: 50 TABLET ORAL at 08:50

## 2018-07-07 RX ADMIN — HYDROCODONE BITARTRATE AND ACETAMINOPHEN 1 TABLET: 7.5; 325 TABLET ORAL at 16:24

## 2018-07-07 RX ADMIN — ATORVASTATIN CALCIUM 80 MG: 40 TABLET, FILM COATED ORAL at 20:02

## 2018-07-07 RX ADMIN — HYDROCODONE BITARTRATE AND ACETAMINOPHEN 1 TABLET: 7.5; 325 TABLET ORAL at 09:16

## 2018-07-07 NOTE — THERAPY TREATMENT NOTE
Acute Care - Speech Language Pathology   Swallow Progress Note [facility]     Patient Name: Cheyanne Bella  : 1926  MRN: [id number]  Today's Date: 2018  Onset of Illness/Injury or Date of Surgery: 18     Referring Physician: DO Cortez      Admit Date: 2018    Visit Dx:      ICD-10-CM ICD-9-CM   1. Acute CVA (cerebrovascular accident) (CMS/HCC) I63.9 434.91   2. Atrial fibrillation, unspecified type (CMS/HCC) I48.91 427.31   3. Received intravenous tissue plasminogen activator (t-PA) in emergency department Z92.82 V45.88   4. Dysphagia, unspecified type R13.10 787.20   5. Impaired mobility and ADLs Z74.09 799.89   6. Impaired functional mobility, balance, gait, and endurance Z74.09 V49.89     Patient Active Problem List   Diagnosis   • Basal cell carcinoma   • Acute CVA (cerebrovascular accident) (CMS/HCC)   • Hypertension   • Hyperlipemia   • Coronary artery disease   • Paroxysmal atrial fibrillation (CMS/HCC)       Therapy Treatment    Therapy Treatment / Health Promotion    Treatment Time/Intention  Discipline: speech language pathologist (18 1315 : YOSEF Bailey)  Document Type: therapy note (daily note) (18 : YOSEF Bailey)  Subjective Information: no complaints (18 : YOSEF Bailey)  Mode of Treatment: individual therapy, speech-language pathology (18 1315 : YOSEF Bailey)  Patient/Family Observations: family present  (18 : YOSEF Bailey)  Care Plan Review: evaluation/treatment results reviewed (185 : YOSEF Bailey)  Care Plan Review, Other Participant(s): daughter (18 : YOSEF Bailey)  Therapy Frequency (Swallow): 5 days per week (18 1315 : YOSEF Bailey)  Patient Effort: good (18 1315 : Billie Rdz, CCC-SLP)  Plan of Care Review  Plan of Care Reviewed With: patient (18 6707 :  Billie I Al-Kabandi, CCC-SLP)    Vitals/Pain/Safety       Cognition, Communication, Swallow  Oral Motor and Function  Dentition Assessment: edentulous, dentures not available (07/07/18 1315 : YOSEF Bailey)  Secretion Management: WNL/WFL (07/07/18 1315 : YOSEF Bailey)  Mucosal Quality: moist, healthy (07/07/18 1315 : YOSEF Bailey)  Recommendations  Therapy Frequency (Swallow): 5 days per week (07/07/18 1315 : YOSEF Bailey)  Anticipated Dischage Disposition: anticipate therapy at next level of care (07/07/18 1315 : YOSEF Bailey)    Outcome Summary  Outcome Summary/Treatment Plan (SLP)  Daily Summary of Progress (SLP): progress toward functional goals as expected (07/07/18 1315 : YOSEF Bailey)  Anticipated Dischage Disposition: anticipate therapy at next level of care (07/07/18 1315 : YOSEF Bailey)            SLP GOALS     Row Name 07/07/18 1315 07/06/18 1000          Oral Nutrition/Hydration Goal 1 (SLP)    Oral Nutrition/Hydration Goal 1, SLP LTG: Will tolerate soft solids and nectar-thick liquids without s/s aspiration with 100% accuracy and no cues  -JA LTG: Will tolerate soft solids and nectar-thick liquids without s/s aspiration with 100% accuracy and no cues  -SM     Time Frame (Oral Nutrition/Hydration Goal 1, SLP) by discharge  -JA by discharge  -SM     Barriers (Oral Nutrition/Hydration Goal 1, SLP) Daughter reports pt likes thin liquids better and has been getting that on trays. Family and pt was educated on risks for aspiration w/ thin liquids and they verbalized understanding.   -JA  --     Progress/Outcomes (Oral Nutrition/Hydration Goal 1, SLP) good progress toward goal  -JA  --        Oral Nutrition/Hydration Goal 2 (SLP)    Oral Nutrition/Hydration Goal 2, SLP Tolerate trials of puree with some soft trials without difficulty with 100% accuracy and no cues  -JA Tolerate trials of puree with  some soft trials without difficulty with 100% accuracy and no cues  -SM     Time Frame (Oral Nutrition/Hydration Goal 2, SLP) short term goal (STG);by discharge  - short term goal (STG);by discharge  -SM     Barriers (Oral Nutrition/Hydration Goal 2, SLP) No overt s/s of aspiration w/ nectar and pureed. Pt reports she chokes w/ straws so they are no longer bringing her straws to use. Pt took a few ice chips w/ no difficulty noted.   -JA  --     Progress/Outcomes (Oral Nutrition/Hydration Goal 2, SLP) good progress toward goal  -JA  --        Labial Strengthening Goal 1 (SLP)    Activity (Labial Strengthening Goal 1, SLP)  -- increase labial tone  -SM     Increase Labial Tone  -- labial resistance exercises  -SM     [place]/Accuracy (Labial Strengthening Goal 1, SLP)  -- independently (over 90% accuracy)  -SM     Time Frame (Labial Strengthening Goal 1, SLP)  -- short term goal (STG);by discharge  -SM        Lingual Strengthening Goal 1 (SLP)    Activity (Lingual Strengthening Goal 1, SLP)  -- increase lingual tone/sensation/control/coordination/movement  -SM     Increase Lingual Tone/Sensation/Control/Coordination/Movement  -- lingual resistance exercises  -SM     [place]/Accuracy (Lingual Strengthening Goal 1, SLP)  -- independently (over 90% accuracy)  -SM     Time Frame (Lingual Strengthening Goal 1, SLP)  -- short term goal (STG);by discharge  -SM        Pharyngeal Strengthening Exercise Goal 1 (SLP)    Activity (Pharyngeal Strengthening Goal 1, SLP)  -- increase anterior movement of the hyolaryngeal complex;increase squeeze/positive pressure generation  -SM     Increase Anterior Movement of the Hyolaryngeal Complex  -- chin tuck against resistance (CTAR)  -SM     Increase Squeeze/Positive Pressure Generation  -- hard effortful swallow  -SM     [place]/Accuracy (Pharyngeal Strengthening Goal 1, SLP)  -- independently (over 90% accuracy)  -SM     Time Frame (Pharyngeal Strengthening Goal 1, SLP)   -- short term goal (STG);by discharge  -        Pharyngeal Strengthening Exercise Goal 2 (SLP)    Activity (Pharyngeal Strengthening Goal 1, SLP)  -- increase timing;increase superior movement of the hyolaryngeal complex  -SM     Increase Timing  -- prepping - 3 second prep or suck swallow or 3-step swallow  -SM     Increase Superior Movement of the Hyolaryngeal Complex  -- super-supraglottic swallow  -     [place]/Accuracy (Pharyngeal Strengthening Goal 1, SLP)  -- independently (over 90% accuracy)  -     Time Frame (Pharyngeal Strengthening Goal 2, SLP)  -- short term goal (STG);by discharge  -        Swallow Management Recall Goal 1 (SLP)    Activity (Swallow Management Recall Goal 1, SLP) independent recall of;recall of;safe diet/liquid level;compensatory swallow strategies/techniques;rationale for use of strategies/techniques  - independent recall of;recall of;safe diet/liquid level;compensatory swallow strategies/techniques;rationale for use of strategies/techniques  -     [place]/Accuracy (Swallow Management Recall Goal 1, SLP) independently (over 90% accuracy)  - independently (over 90% accuracy)  -     Time Frame (Swallow Management Recall Goal 1, SLP) short term goal (STG);by discharge  - short term goal (STG);by discharge  -     Progress/Outcomes (Swallow Management Recall Goal 1, SLP) progress slower than expected  -  --        Phonation Goal 1 (SLP)    Improve Phonation By Goal 1 (SLP)  -- using loud speech;90%;with minimal cues (75-90%)  -     Time Frame (Phonation Goal 1, SLP)  -- short term goal (STG);by discharge  -        Articulation Goal 1 (SLP)    Improve Articulation Goal 1 (SLP)  -- of specific sounds in words;of specific sounds in phrases;90%;with minimal cues (75-90%)  -     Time Frame (Articulation Goal 1, SLP)  -- short term goal (STG);by discharge  -        Additional Goal 1 (SLP)    Additional Goal 1, SLP  -- LTG: Improve cog-comm skills in order  to participate in care while in hospital setting with 90% accuracy and min cues.   -SM     Time Frame (Additional Goal 1, SLP)  -- by discharge  -       User Key  (r) = Recorded By, (t) = Taken By, (c) = Cosigned By    Initials Name Provider Type    YOSEF Salazar Speech and Language Pathologist    NAV Pryor, MS CCC-SLP Speech and Language Pathologist          EDUCATION  The patient has been educated in the following areas:   Dysphagia (Swallowing Impairment) Oral Care/Hydration Modified Diet Instruction.    SLP Recommendation and Plan                       Anticipated Dischage Disposition: anticipate therapy at next level of care     Therapy Frequency (Swallow): 5 days per week          Plan of Care Reviewed With: patient  Plan of Care Review  Plan of Care Reviewed With: patient  Daily Summary of Progress (SLP): progress toward functional goals as expected           Time Calculation:         Time Calculation- SLP     Row Name 07/07/18 1351             Time Calculation- SLP    SLP Start Time 1315  -LOUIE      SLP Received On 07/07/18  -LOUIE        User Key  (r) = Recorded By, (t) = Taken By, (c) = Cosigned By    Initials Name Provider Type    YOSEF Salazar Speech and Language Pathologist          Therapy Charges for Today     Code Description Service Date Service Provider Modifiers Qty    21101712951 HC ST TREATMENT SWALLOW 2 7/7/2018 YOSEF Bailey GN 1                 YOSEF Ng  7/7/2018

## 2018-07-07 NOTE — PLAN OF CARE
Problem: Skin Injury Risk (Adult)  Goal: Identify Related Risk Factors and Signs and Symptoms   07/07/18 0246   Skin Injury Risk (Adult)   Related Risk Factors (Skin Injury Risk) advanced age;critical care admission;mechanical forces;mobility impaired;moisture      07/07/18 0246   Skin Injury Risk (Adult)   Related Risk Factors (Skin Injury Risk) advanced age;critical care admission;mechanical forces;mobility impaired;moisture     Goal: Skin Health and Integrity  Outcome: Ongoing (interventions implemented as appropriate)      Problem: Thrombolytic Therapy (Adult)  Goal: Signs and Symptoms of Listed Potential Problems Will be Absent, Minimized or Managed (Thrombolytic Therapy)  Outcome: Ongoing (interventions implemented as appropriate)   07/07/18 0246   Goal/Outcome Evaluation   Problems Assessed (Thrombolytic Therapy) all   Problems Assessed (Thrombolytic Therapy) none       Problem: Stroke (Ischemic) (Adult)  Goal: Signs and Symptoms of Listed Potential Problems Will be Absent, Minimized or Managed (Stroke)  Outcome: Ongoing (interventions implemented as appropriate)  Left sided weakness, dysarthria, dysphagia   07/07/18 0246   Goal/Outcome Evaluation   Problems Assessed (Stroke (Ischemic)) all   Problems Assessed (Stroke (Ischemic)) communication impairment;eating/swallowing impairment;motor/sensory impairment       Problem: Patient Care Overview  Goal: Plan of Care Review  Outcome: Ongoing (interventions implemented as appropriate)   07/07/18 0246   Coping/Psychosocial   Plan of Care Reviewed With patient;daughter     Goal: Individualization and Mutuality  Outcome: Ongoing (interventions implemented as appropriate)   07/07/18 0246   Individualization   Patient Specific Preferences pt does not like thickened liquids   Patient Specific Goals (Include Timeframe) increased mobility,    Patient Specific Interventions pt has chronic hip and knee pain

## 2018-07-07 NOTE — PROGRESS NOTES
"Cheyanne Bella    Subjective     CC: stroke    History of Present Illness     Cheyanne Bella is followed with stroke. She notes continuing left sided weakness, which may be improved, without other associated symptoms. She denies any new complaints.      There have been no other changes in the patient's interval history since Dr. Marie's note of 7/6/18.    I have reviewed and confirmed the past family, social and medical history as accurate on 7/7/18.     Review of Systems   Constitutional: Negative.    Respiratory: Negative.    Cardiovascular: Negative.    Gastrointestinal: Negative.    Genitourinary: Negative.        Objective     /68   Pulse 88   Temp 98.7 °F (37.1 °C) (Oral)   Resp 20   Ht 162.6 cm (64\")   Wt 109 kg (240 lb)   SpO2 97%   BMI 41.20 kg/m²     Physical Exam   Psychiatric: Her speech is slurred.        Neurologic Exam     Mental Status   Oriented to person.   Attention: normal.   Speech: slurred   Level of consciousness: alert  Normal comprehension.     Cranial Nerves   Cranial nerves II through XII intact.   Except left facial paresis     Motor Exam   Muscle bulk: normal  Overall muscle tone: normalLeft hemiparesis (5/4)     Sensory Exam   Light touch normal.   But, left sided neglect to double simultaneous tactile stimulation       Laboratory and radiological testing is notable for a normal BMP/CBC. MRI confirms a large right MCA territory infarct. ECHO does not show apparent cardioembolic source. CTA of the neck is normal.     Assessment/Plan     Cheyanne Bella is followed with stroke. Her workup is complete. I agree with ASA and statin for now. I don't have additional recommendations today.        As part of this visit I reviewed prior lab results, reviewed radiology results, reviewed radiology images and reviewed records from the current hospitalization which is incorporated in the HPI.  "

## 2018-07-07 NOTE — PLAN OF CARE
Problem: Patient Care Overview  Goal: Plan of Care Review  Outcome: Ongoing (interventions implemented as appropriate)   07/07/18 3652   Coping/Psychosocial   Plan of Care Reviewed With patient   Coping/Psychosocial   Patient Agreement with Plan of Care agrees   SLP treatment completed. Will continue to address dysphagia. Please see note for further details and recommendations.

## 2018-07-07 NOTE — PROGRESS NOTES
INTENSIVIST   PROGRESS NOTE     Hospital:  LOS: 2 days     Ms. Cheyanne Bella, 89 y.o. female is followed for a Chief Complaint of: Right MCA Infarct s/p TPA, Afib      Subjective   S   Ms. Bella is a 89yo F who presented to the [facility] ED at 1157 via after after EMS was called by family who noticed a new left sided facial droop and slurred speech and weakness. The patient notes that she suddenly felt as if she couldn't stand. She was last known well at 0830 this morning. In the ED, she had a CT head performed which was negative for an acute intracranial process. She was then given TPA. CT perfusion was performed which showed an area of ischemia in the posterior and superior right frontal lobe with a small underlying core infarct. Neuro-interventional evaluated her imaging and did not think that she was a candidate for further intervention in the cath lab. She has also been going in and out of Afib which is new for her.     Interval History:  No events overnight.         The patient's relevant past medical, surgical and social history were reviewed and updated in Epic as appropriate.      ROS:   Constitutional: Negative for fever.   Respiratory: Negative for dyspnea.   Cardiovascular: Negative for chest pain.   Gastrointestinal: Negative for  nausea, vomiting and diarrhea.     Objective   O     Vitals:  Temp  Min: 98.7 °F (37.1 °C)  Max: 100.2 °F (37.9 °C)  BP  Min: 115/43  Max: 176/82  Pulse  Min: 74  Max: 95  Resp  Min: 14  Max: 20  SpO2  Min: 91 %  Max: 97 % Flow (L/min)  Min: 2  Max: 2    Intake/Ouptut 24 hrs (7:00AM - 6:59 AM)  Intake & Output (last 3 days)       07/04 0701 - 07/05 0700 07/05 0701 - 07/06 0700 07/06 0701 - 07/07 0700 07/07 0701 - 07/08 0700    Urine (mL/kg/hr)  925 400 (0.2)     Stool  450      Total Output   1375 400      Net   -1375 -400              Unmeasured Urine Occurrence  2 x 3 x             Physical Examination  Telemetry:  Normal sinus rhythm.    Constitutional:  No acute distress.    Cardiovascular: Normal rate, regular and rhythm. Normal heart sounds.  No murmurs, gallop or rub.   Respiratory: No respiratory distress. Normal respiratory effort.  Normal breath sounds  Clear to ascultation.    Abdominal:  Soft. No masses. Non-tender. No distension. No HSM.   Extremities: No digital cyanosis. No clubbing.  No peripheral edema.   Neurological:   Awakens easily.   Left lower extremity weakness.        Interval:  (return from CT)  1a. Level of Consciousness: 1-->Not alert, but arousable by minor stimulation to obey, answer, or respond  1b. LOC Questions: 0-->Answers both questions correctly  1c. LOC Commands: 0-->Performs both tasks correctly  2. Best Gaze: 0-->Normal  3. Visual: 0-->No visual loss  4. Facial Palsy: 1-->Minor paralysis (flattened nasolabial fold, asymmetry on smiling)  5a. Motor Arm, Left: 1-->Drift, limb holds 90 (or 45) degrees, but drifts down before full 10 seconds, does not hit bed or other support  5b. Motor Arm, Right: 0-->No drift, limb holds 90 (or 45) degrees for full 10 secs  6a. Motor Leg, Left: 1-->Drift, leg falls by the end of the 5-sec period but does not hit bed  6b. Motor Leg, Right: 0-->No drift, leg holds 30 degree position for full 5 secs  7. Limb Ataxia: 0-->Absent  8. Sensory: 1-->Mild-to-moderate sensory loss, patient feels pinprick is less sharp or is dull on the affected side, or there is a loss of superficial pain with pinprick, but patient is aware of being touched  9. Best Language: 0-->No aphasia, normal  10. Dysarthria: 1-->Mild-to-moderate dysarthria, patient slurs at least some words and, at worst, can be understood with some difficulty  11. Extinction and Inattention (formerly Neglect): 0-->No abnormality    Total (NIH Stroke Scale): 6         Results from last 7 days  Lab Units 07/07/18  0427 07/06/18  0511 07/05/18  1215   WBC 10*3/mm3 8.75 8.95 5.10   HEMOGLOBIN g/dL 11.6 12.9 11.8   MCV fL 85.7 85.9 83.9   PLATELETS 10*3/mm3 162 151 145*        Results from last 7 days  Lab Units 07/07/18  0428 07/06/18  0511 07/05/18  1209   SODIUM mmol/L 142 139  --    POTASSIUM mmol/L 3.8 4.4  --    CO2 mmol/L 29.0 27.0  --    CREATININE mg/dL 1.07 0.94 1.00   GLUCOSE mg/dL 114* 136*  --    MAGNESIUM mg/dL 2.2  --   --    PHOSPHORUS mg/dL 2.9  --   --      Estimated Creatinine Clearance: 40.5 mL/min (by C-G formula based on SCr of 1.07 mg/dL).    Results from last 7 days  Lab Units 07/06/18  0511 07/05/18  1215   ALK PHOS U/L 74  --    BILIRUBIN mg/dL 0.9  --    ALT (SGPT) U/L 13 15   AST (SGOT) U/L 22 19             Images:  Imaging Results (last 24 hours)     Procedure Component Value Units Date/Time    CT Head Without Contrast [[id number]] Collected:  07/06/18 1749     Updated:  07/06/18 1749    Narrative:       EXAMINATION: CT HEAD WO CONTRAST - 7/6/2018     INDICATION: Stroke; I63.9-Cerebral infarction, unspecified;  I48.91-Unspecified atrial fibrillation; Z92.82-Status post  administration of tPA (rtPA) in a different facility within the last 24  hours prior to admission to current facility; R13.10-Dysphagia,  unspecified; Z74.09-Other reduced mobility.     TECHNIQUE: Axial CT of the head without intravenous contrast  administration.     The radiation dose reduction device was turned on for each scan per the  ALARA (As Low as Reasonably Achievable) protocol.     COMPARISON: MRI dated 7/6/2018.     FINDINGS: Large area of ill-defined low attenuation with sulcal  effacement involving the right temporoparietal region corresponding to  area of restricted diffusion on prior MRI consistent with evolving  infarction. No intracranial hemorrhage or extra-axial fluid collection.  Ventricles and sulci within normal limits without evidence for  hydrocephalus or midline shift. Globes and orbits unremarkable.  Visualized paranasal sinuses and mastoid air cells are grossly clear and  well pneumatized. Calvarium intact.       Impression:       Evolving infarction right  temporoparietal region with local  mass effect and sulcal effacement however no midline shift or  hydrocephalus development. No intra-axial hemorrhage or extra-axial  fluid collection 24 hours post TPA administration.     DICTATED:   7/6/2018  EDITED/ls :   7/6/2018                Results: Reviewed.  I reviewed the patient's new laboratory and imaging results.  I independently reviewed the patient's new images.    Medications: Reviewed.    Assessment/Plan   A / P     Ms. Bella is a 89yo F who is admitted with an acute CVA and is s/p TPA. She was found to have new atrial fibrillation which may likely be the cause of her stroke. She is admitted to the ICU for closer monitoring.     Nutrition:   Diet Dysphagia; II - Pureed; Nectar / Syrup Thick; Cardiac  Advance Directives:   Code Status and Medical Interventions:   Ordered at: 07/05/18 1609     Level Of Support Discussed With:    Patient     Code Status:    No CPR     Medical Interventions (Level of Support Prior to Arrest):    Full       Hospital Problem List     * (Principal)Acute CVA (cerebrovascular accident) (CMS/HCC)    Hypertension    Hyperlipemia    Coronary artery disease    Paroxysmal atrial fibrillation (CMS/[facility])          Assessment / Plan:    1. Repeat CT head negative for hemorrhage.  2. Neurology following  3. PT/OT  4. ASA/Statin  5. AM labs  6. Will likely need SNF after discharge. Case management aware.   7. Ok to transfer to telemetry when a bed is available.     Plan of care and goals reviewed during interdisciplinary rounds.  I discussed the patient's findings and my recommendations with patient and nursing staff    Time: was greater than 35 minutes.      Anabel Toro, DO    Intensive Care Medicine and Pulmonary Medicine

## 2018-07-08 PROCEDURE — 99232 SBSQ HOSP IP/OBS MODERATE 35: CPT | Performed by: INTERNAL MEDICINE

## 2018-07-08 PROCEDURE — 97110 THERAPEUTIC EXERCISES: CPT

## 2018-07-08 PROCEDURE — 99231 SBSQ HOSP IP/OBS SF/LOW 25: CPT | Performed by: PSYCHIATRY & NEUROLOGY

## 2018-07-08 PROCEDURE — 97530 THERAPEUTIC ACTIVITIES: CPT

## 2018-07-08 RX ORDER — NYSTATIN 100000 [USP'U]/G
POWDER TOPICAL EVERY 8 HOURS SCHEDULED
Status: DISCONTINUED | OUTPATIENT
Start: 2018-07-08 | End: 2018-07-09 | Stop reason: HOSPADM

## 2018-07-08 RX ADMIN — LOSARTAN POTASSIUM 50 MG: 50 TABLET ORAL at 08:41

## 2018-07-08 RX ADMIN — ATORVASTATIN CALCIUM 80 MG: 40 TABLET, FILM COATED ORAL at 20:00

## 2018-07-08 RX ADMIN — NYSTATIN: 100000 POWDER TOPICAL at 21:59

## 2018-07-08 RX ADMIN — HYDROCODONE BITARTRATE AND ACETAMINOPHEN 1 TABLET: 7.5; 325 TABLET ORAL at 04:36

## 2018-07-08 RX ADMIN — ASPIRIN 325 MG ORAL TABLET 325 MG: 325 PILL ORAL at 08:41

## 2018-07-08 RX ADMIN — HYDROCODONE BITARTRATE AND ACETAMINOPHEN 1 TABLET: 7.5; 325 TABLET ORAL at 14:34

## 2018-07-08 RX ADMIN — FUROSEMIDE 40 MG: 40 TABLET ORAL at 08:41

## 2018-07-08 RX ADMIN — MAGNESIUM HYDROXIDE 10 ML: 2400 SUSPENSION ORAL at 11:54

## 2018-07-08 NOTE — PROGRESS NOTES
INTENSIVIST   PROGRESS NOTE     Hospital:  LOS: 3 days     Ms. Cheyanne Bella, 89 y.o. female is followed for a Chief Complaint of: Right MCA Infarct s/p TPA, Afib      Subjective   S   Ms. Bella is a 89yo F who presented to the [facility] ED at 1157 via after after EMS was called by family who noticed a new left sided facial droop and slurred speech and weakness. The patient notes that she suddenly felt as if she couldn't stand. She was last known well at 0830 this morning. In the ED, she had a CT head performed which was negative for an acute intracranial process. She was then given TPA. CT perfusion was performed which showed an area of ischemia in the posterior and superior right frontal lobe with a small underlying core infarct. Neuro-interventional evaluated her imaging and did not think that she was a candidate for further intervention in the cath lab. She has also been going in and out of Afib which is new for her.     Interval History:  No events overnight.         The patient's relevant past medical, surgical and social history were reviewed and updated in Epic as appropriate.      ROS:   Constitutional: Negative for fever.   Respiratory: Negative for dyspnea.   Cardiovascular: Negative for chest pain.   Gastrointestinal: Negative for  nausea, vomiting and diarrhea.     Objective   O     Vitals:  Temp  Min: 97.7 °F (36.5 °C)  Max: 99.7 °F (37.6 °C)  BP  Min: 100/57  Max: 180/93  Pulse  Min: 75  Max: 98  Resp  Min: 16  Max: 18  SpO2  Min: 90 %  Max: 94 % No Data Recorded    Intake/Ouptut 24 hrs (7:00AM - 6:59 AM)  Intake & Output (last 3 days)       07/05 0701 - 07/06 0700 07/06 0701 - 07/07 0700 07/07 0701 - 07/08 0700 07/08 0701 - 07/09 0700    P.O.   150     Total Intake(mL/kg)   150 (1.4)     Urine (mL/kg/hr) 925 400 (0.2) 600 (0.2)     Stool 450       Total Output 1375 400 600      Net -1375 -400 -450              Unmeasured Urine Occurrence 2 x 3 x 1 x             Physical Examination  Telemetry:  Normal sinus  rhythm.    Constitutional:  No acute distress.   Cardiovascular: Normal rate, regular and rhythm. Normal heart sounds.  No murmurs, gallop or rub.   Respiratory: No respiratory distress. Normal respiratory effort.  Normal breath sounds  Clear to ascultation.    Abdominal:  Soft. No masses. Non-tender. No distension. No HSM.   Extremities: No digital cyanosis. No clubbing.  No peripheral edema.   Neurological:   Awakens easily.   Left lower extremity weakness.        Interval:  (return from CT)  1a. Level of Consciousness: 1-->Not alert, but arousable by minor stimulation to obey, answer, or respond  1b. LOC Questions: 0-->Answers both questions correctly  1c. LOC Commands: 0-->Performs both tasks correctly  2. Best Gaze: 0-->Normal  3. Visual: 0-->No visual loss  4. Facial Palsy: 1-->Minor paralysis (flattened nasolabial fold, asymmetry on smiling)  5a. Motor Arm, Left: 1-->Drift, limb holds 90 (or 45) degrees, but drifts down before full 10 seconds, does not hit bed or other support  5b. Motor Arm, Right: 0-->No drift, limb holds 90 (or 45) degrees for full 10 secs  6a. Motor Leg, Left: 0-->No drift, leg holds 30 degree position for full 5 secs  6b. Motor Leg, Right: 0-->No drift, leg holds 30 degree position for full 5 secs  7. Limb Ataxia: 0-->Absent  8. Sensory: 0-->Normal, no sensory loss  9. Best Language: 0-->No aphasia, normal  10. Dysarthria: 1-->Mild-to-moderate dysarthria, patient slurs at least some words and, at worst, can be understood with some difficulty  11. Extinction and Inattention (formerly Neglect): 0-->No abnormality    Total (NIH Stroke Scale): 4         Results from last 7 days  Lab Units 07/07/18  0427 07/06/18  0511 07/05/18  1215   WBC 10*3/mm3 8.75 8.95 5.10   HEMOGLOBIN g/dL 11.6 12.9 11.8   MCV fL 85.7 85.9 83.9   PLATELETS 10*3/mm3 162 151 145*       Results from last 7 days  Lab Units 07/07/18  0428 07/06/18  0511 07/05/18  1209   SODIUM mmol/L 142 139  --    POTASSIUM mmol/L 3.8 4.4   --    CO2 mmol/L 29.0 27.0  --    CREATININE mg/dL 1.07 0.94 1.00   GLUCOSE mg/dL 114* 136*  --    MAGNESIUM mg/dL 2.2  --   --    PHOSPHORUS mg/dL 2.9  --   --      Estimated Creatinine Clearance: 40.5 mL/min (by C-G formula based on SCr of 1.07 mg/dL).    Results from last 7 days  Lab Units 07/06/18  0511 07/05/18  1215   ALK PHOS U/L 74  --    BILIRUBIN mg/dL 0.9  --    ALT (SGPT) U/L 13 15   AST (SGOT) U/L 22 19             Images:  Imaging Results (last 24 hours)     ** No results found for the last 24 hours. **            Results: Reviewed.  I reviewed the patient's new laboratory and imaging results.  I independently reviewed the patient's new images.    Medications: Reviewed.    Assessment/Plan   A / P     Ms. Bella is a 89yo F who is admitted with an acute CVA and is s/p TPA. She was found to have new atrial fibrillation which may likely be the cause of her stroke. She is admitted to the ICU for closer monitoring. She will need SNF placement at discharge.     Nutrition:   Diet Dysphagia; II - Pureed; Nectar / Syrup Thick; Cardiac  Advance Directives:   Code Status and Medical Interventions:   Ordered at: 07/05/18 1609     Level Of Support Discussed With:    Patient     Code Status:    No CPR     Medical Interventions (Level of Support Prior to Arrest):    Full       Hospital Problem List     * (Principal)Acute CVA (cerebrovascular accident) (CMS/[facility])    Hypertension    Hyperlipemia    Coronary artery disease    Paroxysmal atrial fibrillation (CMS/[facility])          Assessment / Plan:    1. Continue PT/OT  2. ASA/Statin  3. AM labs  4. Will likely need SNF after discharge. Case management aware.   5. Ok to transfer to telemetry when a bed is available.     Plan of care and goals reviewed during interdisciplinary rounds.  I discussed the patient's findings and my recommendations with patient and nursing staff    Time: was greater than 25 minutes.      Anabel Toro, DO    Intensive Care Medicine and Pulmonary  Medicine

## 2018-07-08 NOTE — THERAPY TREATMENT NOTE
Acute Care - Physical Therapy Treatment Note  [facility]     Patient Name: Cheyanne Bella  : 1926  MRN: [id number]  Today's Date: 2018  Onset of Illness/Injury or Date of Surgery: 18  Date of Referral to PT: 18  Referring Physician: DO Cortez    Admit Date: 2018    Visit Dx:    ICD-10-CM ICD-9-CM   1. Acute CVA (cerebrovascular accident) (CMS/HCC) I63.9 434.91   2. Atrial fibrillation, unspecified type (CMS/HCC) I48.91 427.31   3. Received intravenous tissue plasminogen activator (t-PA) in emergency department Z92.82 V45.88   4. Dysphagia, unspecified type R13.10 787.20   5. Impaired mobility and ADLs Z74.09 799.89   6. Impaired functional mobility, balance, gait, and endurance Z74.09 V49.89     Patient Active Problem List   Diagnosis   • Basal cell carcinoma   • Acute CVA (cerebrovascular accident) (CMS/HCC)   • Hypertension   • Hyperlipemia   • Coronary artery disease   • Paroxysmal atrial fibrillation (CMS/HCC)       Therapy Treatment          Rehabilitation Treatment Summary     Row Name 18 1400             Treatment Time/Intention    Discipline physical therapist  -NICK      Document Type therapy note (daily note)  -NICK      Subjective Information complains of;weakness  -NICK      Mode of Treatment physical therapy  -NICK      Care Plan Review care plan/treatment goals reviewed;risks/benefits reviewed;patient/other agree to care plan  -NICK      Care Plan Review, Other Participant(s) daughter  -NICK      Therapy Frequency (PT Clinical Impression) daily  -NICK      Existing Precautions/Restrictions fall  -NICK      Recorded by [NICK] Maritza Martin, PT 18 1507      Row Name 18 1400             Cognitive Assessment/Intervention- PT/OT    Affect/Mental Status (Cognitive) WFL  -NICK      Orientation Status (Cognition) oriented x 4  -NICK      Follows Commands (Cognition) follows one step commands;over 90% accuracy  -NICK      Cognitive Function (Cognitive) safety deficit  -NICK      Safety  Deficit (Cognitive) mild deficit;safety precautions awareness;safety precautions follow-through/compliance;awareness of need for assistance;insight into deficits/self awareness  -NICK      Personal Safety Interventions fall prevention program maintained;gait belt;nonskid shoes/slippers when out of bed  -NICK      Recorded by [NICK] Maritza Martin, PT 07/08/18 1507      Row Name 07/08/18 1400             Safety Issues, Functional Mobility    Safety Issues Affecting Function (Mobility) safety precaution awareness;safety precautions follow-through/compliance;insight into deficits/self awareness;awareness of need for assistance  -NICK      Impairments Affecting Function (Mobility) balance;coordination;endurance/activity tolerance;pain;strength;motor control  -NICK      Recorded by [NICK] Maritza Martin, PT 07/08/18 1507      Row Name 07/08/18 1400             Bed Mobility Assessment/Treatment    Bed Mobility Assessment/Treatment supine-sit  -NICK      Supine-Sit [place] (Bed Mobility) maximum assist (25% patient effort);2 person assist  -NICK      Assistive Device (Bed Mobility) draw sheet;head of bed elevated  -NICK      Comment (Bed Mobility) patient able to get legs to edge of the bed needs asssit with trunk  -NICK      Recorded by [NICK] Maritza Martin, PT 07/08/18 1507      Row Name 07/08/18 1400             Transfer Assessment/Treatment    Transfer Assessment/Treatment sit-stand transfer;stand-sit transfer;bed-chair transfer  -NICK      Recorded by [NICK] Maritza Martin, PT 07/08/18 1507      Row Name 07/08/18 1400             Bed-Chair Transfer    Bed-Chair [place] (Transfers) moderate assist (50% patient effort);2 person assist  -NICK      Recorded by [NICK] Maritza Martin, PT 07/08/18 1507      Row Name 07/08/18 1400             Sit-Stand Transfer    Sit-Stand [place] (Transfers) moderate assist (50% patient effort);2 person assist   stood multiple times   -NICK      Recorded by [NICK] Maritza Martin, PT  07/08/18 1507      Row Name 07/08/18 1400             Stand-Sit Transfer    Stand-Sit [place] (Transfers) moderate assist (50% patient effort);2 person assist  -NICK      Recorded by [NICK] Maritza Martin, PT 07/08/18 1507      Row Name 07/08/18 1400             Gait/Stairs Assessment/Training    Gait/Stairs Assessment/Training gait/ambulation assistive device  -NICK      [place] Level (Gait) moderate assist (50% patient effort);2 person assist  -NICK      Distance in Feet (Gait) 2  -NICK      Comment (Gait/Stairs) patient is only able to pivot steps to the chair   -NICK      Recorded by [NICK] Maritza Martin, PT 07/08/18 1507      Row Name 07/08/18 1400             Motor Skills Assessment/Interventions    Additional Documentation Therapeutic Exercise (Group)  -NICK      Recorded by [NICK] Maritza Martin, PT 07/08/18 1507      Row Name 07/08/18 1400             Therapeutic Exercise    68396 - PT Therapeutic Exercise Minutes 8  -NICK      41523 - PT Therapeutic Activity Minutes 15  -NICK      Recorded by [NICK] Maritza Martin, PT 07/08/18 1507      Row Name 07/08/18 1400             Therapeutic Exercise    Upper Extremity Range of Motion (Therapeutic Exercise) shoulder flexion/extension, bilateral;shoulder abduction/adduction, bilateral;elbow flexion/extension, bilateral;wrist flexion/extension, bilateral  -NICK      Lower Extremity Range of Motion (Therapeutic Exercise) hip flexion/extension, bilateral;hip abduction/adduction, bilateral;knee flexion/extension, bilateral;ankle dorsiflexion/plantar flexion, bilateral  -NICK      Exercise Type (Therapeutic Exercise) AAROM (active assistive range of motion)  -NICK      Sets/Reps (Therapeutic Exercise) 1/10  -NICK      Recorded by [NICK] Maritza Martin, PT 07/08/18 1507      Row Name 07/08/18 1400             Static Sitting Balance    Level of [place] (Unsupported Sitting, Static Balance) minimal assist, 75% patient effort  -NICK      Sitting Position (Unsupported Sitting,  Static Balance) sitting on edge of bed  -NICK      Recorded by [NICK] Maritza Martin, PT 07/08/18 1507      Row Name 07/08/18 1400             Static Standing Balance    Level of [place] (Supported Standing, Static Balance) moderate assist, 50 to 74% patient effort  -NICK      Recorded by [NICK] Maritza Martin, PT 07/08/18 1507      Row Name 07/08/18 1400             Positioning and Restraints    Pre-Treatment Position in bed  -NICK      Post Treatment Position chair  -NICK      In Chair notified nsg;reclined;sitting;call light within reach;exit alarm on;with family/caregiver  -NICK      Recorded by [NICK] Maritza Martin, PT 07/08/18 1507      Row Name 07/08/18 1400             Pain Scale: Numbers Pre/Post-Treatment    Pain Scale: Numbers, Pretreatment 3/10  -NICK      Pain Scale: Numbers, Post-Treatment 3/10  -NICK      Pain Location - Side Right  -NICK      Pain Location hip  -NICK      Pain Intervention(s) Repositioned;Ambulation/increased activity  -NICK      Recorded by [NICK] Maritza Martin, PT 07/08/18 1507      Row Name 07/08/18 1400             Outcome Summary/Treatment Plan (PT)    Daily Summary of Progress (PT) progress towards functional goals is fair  -NICK      Anticipated Discharge Disposition (PT) inpatient rehabilitation facility  -NICK      Recorded by [NICK] Maritza Martin, PT 07/08/18 1507        User Key  (r) = Recorded By, (t) = Taken By, (c) = Cosigned By    Initials Name Effective Dates Discipline    NICK Maritza Martin, PT 06/19/15 -  PT                     Physical Therapy Education     Title: PT OT SLP Therapies (Active)     Topic: Physical Therapy (Active)     Point: Mobility training (Active)    Learning Progress Summary     Learner Status Readiness Method Response Comment Documented by    Patient Active Acceptance E NR  NICK 07/08/18 1400     Active Acceptance E,D NR  LS 07/06/18 1055          Point: Home exercise program (Active)    Learning Progress Summary     Learner Status Readiness Method  Response Comment Documented by    Patient Active Acceptance E NR  NICK 07/08/18 1400          Point: Body mechanics (Active)    Learning Progress Summary     Learner Status Readiness Method Response Comment Documented by    Patient Active Acceptance E NR  NICK 07/08/18 1400     Active Acceptance E,D NR  LS 07/06/18 1055          Point: Precautions (Active)    Learning Progress Summary     Learner Status Readiness Method Response Comment Documented by    Patient Active Acceptance E NR  NICK 07/08/18 1400     Active Acceptance E,D NR  LS 07/06/18 1055                      User Key     Initials Effective Dates Name Provider Type Discipline     06/19/15 -  Maritza Martin, PT Physical Therapist PT     06/19/15 -  Kirstie Shipley, PT Physical Therapist PT                    PT Recommendation and Plan  Anticipated Discharge Disposition (PT): inpatient rehabilitation facility  Therapy Frequency (PT Clinical Impression): daily  Outcome Summary/Treatment Plan (PT)  Daily Summary of Progress (PT): progress towards functional goals is fair  Anticipated Discharge Disposition (PT): inpatient rehabilitation facility  Plan of Care Reviewed With: patient  Outcome Summary: patient worked on sitting balance and sit to stand transfers with mod assist           Outcome Measures     Row Name 07/08/18 1400 07/06/18 0845 07/06/18 0827       How much help from another person do you currently need...    Turning from your back to your side while in flat bed without using bedrails? 3  -NICK 3  -LS  --    Moving from lying on back to sitting on the side of a flat bed without bedrails? 2  -NICK 2  -LS  --    Moving to and from a bed to a chair (including a wheelchair)? 2  -NICK 2  -LS  --    Standing up from a chair using your arms (e.g., wheelchair, bedside chair)? 2  -NICK 2  -LS  --    Climbing 3-5 steps with a railing? 1  -NICK 1  -LS  --    To walk in hospital room? 2  -NICK 2  -LS  --    AM-PAC 6 Clicks Score 12  -NICK 12  -LS  --       How much help  from another is currently needed...    Putting on and taking off regular lower body clothing?  --  -- 1  -CL    Bathing (including washing, rinsing, and drying)  --  -- 2  -CL    Toileting (which includes using toilet bed pan or urinal)  --  -- 1  -CL    Putting on and taking off regular upper body clothing  --  -- 2  -CL    Taking care of personal grooming (such as brushing teeth)  --  -- 2  -CL    Eating meals  --  -- 3  -CL    Score  --  -- 11  -CL       Modified Runnels Scale    Modified Jan Scale  -- 4 - Moderately severe disability.  Unable to walk without assistance, and unable to attend to own bodily needs without assistance.  -LS 4 - Moderately severe disability.  Unable to walk without assistance, and unable to attend to own bodily needs without assistance.  -CL       Functional Assessment    Outcome Measure Options  -- AM-PAC 6 Clicks Basic Mobility (PT)  -LS AM-PAC 6 Clicks Daily Activity (OT);Modified Runnels  -CL      User Key  (r) = Recorded By, (t) = Taken By, (c) = Cosigned By    Initials Name Provider Type    NICK Martin, PT Physical Therapist    LS Kirstie Shipley, PT Physical Therapist    CL Iman Perrin, OT Occupational Therapist           Time Calculation:         PT Charges     Row Name 07/08/18 1400             Time Calculation    Start Time 1400  -      PT Received On 07/08/18  -      PT Goal Re-Cert Due Date 07/16/18  -         Time Calculation- PT    Total Timed Code Minutes- PT 23 minute(s)  -NICK         Timed Charges    91359 - PT Therapeutic Exercise Minutes 8  -NICK      49898 - PT Therapeutic Activity Minutes 15  -NICK        User Key  (r) = Recorded By, (t) = Taken By, (c) = Cosigned By    Initials Name Provider Type    NICK Martin, PT Physical Therapist        Therapy Suggested Charges     Code   Minutes Charges    95174 (CPT®) Hc Pt Neuromusc Re Education Ea 15 Min      86680 (CPT®) Hc Pt Ther Proc Ea 15 Min 8 1    84705 (CPT®) Hc Gait Training Ea 15 Min      10053  (CPT®) Hc Pt Therapeutic Act Ea 15 Min 15 1    38314 (CPT®) Hc Pt Manual Therapy Ea 15 Min      33921 (CPT®) Hc Pt Iontophoresis Ea 15 Min      17853 (CPT®) Hc Pt Elec Stim Ea-Per 15 Min      66518 (CPT®) Hc Pt Ultrasound Ea 15 Min      39311 (CPT®) Hc Pt Self Care/Mgmt/Train Ea 15 Min      Total  23 2        Therapy Charges for Today     Code Description Service Date Service Provider Modifiers Qty    51273419525 HC PT THER PROC EA 15 MIN 7/8/2018 Maritza Martin, PT GP 1    43891276058 HC PT THERAPEUTIC ACT EA 15 MIN 7/8/2018 Maritza Martin, PT GP 1    19096104008 HC PT THER SUPP EA 15 MIN 7/8/2018 Maritza Martin, PT GP 1          PT G-Codes  Outcome Measure Options: AM-PAC 6 Clicks Basic Mobility (PT)    Maritza Martin, PT  7/8/2018

## 2018-07-08 NOTE — PROGRESS NOTES
"Cheyanne Bella    Subjective     CC: stroke    History of Present Illness     Cheyanne Bella is followed with stroke. She continues to note a left hemiparesis, but denies any changes in her interval history.      There have been no other changes in the patient's interval history since my last note of 7/7/18.    I have reviewed and confirmed the past family, social and medical history as accurate on 7/7/18.     Review of Systems   Constitutional: Negative.        Objective     /54   Pulse 76   Temp 97.9 °F (36.6 °C) (Oral)   Resp 18   Ht 162.6 cm (64\")   Wt 109 kg (240 lb)   SpO2 94%   BMI 41.20 kg/m²     Physical Exam   Psychiatric: Her speech is slurred.        Neurologic Exam     Mental Status   Oriented to person.   Speech: slurred   Normal comprehension.     Cranial Nerves   Cranial nerves II through XII intact.   Except left facial paresis     Motor Exam   Muscle bulk: normal  Overall muscle tone: normalLeft hemiparesis (5/4)     Sensory Exam   But, left sided neglect to double simultaneous tactile stimulation       Laboratory and radiological testing is notable for a normal BMP/CBC. MRI confirms a large right MCA territory infarct. ECHO does not show apparent cardioembolic source. CTA of the neck is normal.     Assessment/Plan     Cheyanne Bella is followed with stroke. Her workup is complete. I agree with ASA and statin for now. I don't have further recommendations at this time.        As part of this visit I reviewed records from the current hospitalization which is incorporated in the HPI.  "

## 2018-07-09 VITALS
WEIGHT: 240 LBS | TEMPERATURE: 98.6 F | HEIGHT: 64 IN | DIASTOLIC BLOOD PRESSURE: 74 MMHG | OXYGEN SATURATION: 94 % | BODY MASS INDEX: 40.97 KG/M2 | RESPIRATION RATE: 16 BRPM | SYSTOLIC BLOOD PRESSURE: 171 MMHG | HEART RATE: 93 BPM

## 2018-07-09 LAB
ANION GAP SERPL CALCULATED.3IONS-SCNC: 11 MMOL/L (ref 3–11)
BUN BLD-MCNC: 32 MG/DL (ref 9–23)
BUN/CREAT SERPL: 27.6 (ref 7–25)
CALCIUM SPEC-SCNC: 8.9 MG/DL (ref 8.7–10.4)
CHLORIDE SERPL-SCNC: 103 MMOL/L (ref 99–109)
CO2 SERPL-SCNC: 29 MMOL/L (ref 20–31)
CREAT BLD-MCNC: 1.16 MG/DL (ref 0.6–1.3)
DEPRECATED RDW RBC AUTO: 41.4 FL (ref 37–54)
ERYTHROCYTE [DISTWIDTH] IN BLOOD BY AUTOMATED COUNT: 13.6 % (ref 11.3–14.5)
GFR SERPL CREATININE-BSD FRML MDRD: 44 ML/MIN/1.73
GFR SERPL CREATININE-BSD FRML MDRD: 53 ML/MIN/1.73
GLUCOSE BLD-MCNC: 119 MG/DL (ref 70–100)
HCT VFR BLD AUTO: 37.2 % (ref 34.5–44)
HGB BLD-MCNC: 12.1 G/DL (ref 11.5–15.5)
MAGNESIUM SERPL-MCNC: 2.4 MG/DL (ref 1.3–2.7)
MCH RBC QN AUTO: 27.6 PG (ref 27–31)
MCHC RBC AUTO-ENTMCNC: 32.5 G/DL (ref 32–36)
MCV RBC AUTO: 84.7 FL (ref 80–99)
PHOSPHATE SERPL-MCNC: 4 MG/DL (ref 2.4–5.1)
PLATELET # BLD AUTO: 144 10*3/MM3 (ref 150–450)
PMV BLD AUTO: 9.6 FL (ref 6–12)
POTASSIUM BLD-SCNC: 3.9 MMOL/L (ref 3.5–5.5)
RBC # BLD AUTO: 4.39 10*6/MM3 (ref 3.89–5.14)
SODIUM BLD-SCNC: 143 MMOL/L (ref 132–146)
WBC NRBC COR # BLD: 9.58 10*3/MM3 (ref 3.5–10.8)

## 2018-07-09 PROCEDURE — 97530 THERAPEUTIC ACTIVITIES: CPT

## 2018-07-09 PROCEDURE — 99238 HOSP IP/OBS DSCHRG MGMT 30/<: CPT | Performed by: INTERNAL MEDICINE

## 2018-07-09 PROCEDURE — 85027 COMPLETE CBC AUTOMATED: CPT | Performed by: INTERNAL MEDICINE

## 2018-07-09 PROCEDURE — 84100 ASSAY OF PHOSPHORUS: CPT | Performed by: INTERNAL MEDICINE

## 2018-07-09 PROCEDURE — 80048 BASIC METABOLIC PNL TOTAL CA: CPT | Performed by: INTERNAL MEDICINE

## 2018-07-09 PROCEDURE — 25010000002 MORPHINE PER 10 MG: Performed by: INTERNAL MEDICINE

## 2018-07-09 PROCEDURE — 83735 ASSAY OF MAGNESIUM: CPT | Performed by: INTERNAL MEDICINE

## 2018-07-09 PROCEDURE — 99231 SBSQ HOSP IP/OBS SF/LOW 25: CPT | Performed by: PSYCHIATRY & NEUROLOGY

## 2018-07-09 PROCEDURE — 25010000002 ALTEPLASE PER 1 MG: Performed by: EMERGENCY MEDICINE

## 2018-07-09 RX ORDER — ASPIRIN 325 MG
325 TABLET ORAL DAILY
Start: 2018-07-10

## 2018-07-09 RX ORDER — MORPHINE SULFATE 1 MG/ML
2 INJECTION, SOLUTION EPIDURAL; INTRATHECAL; INTRAVENOUS ONCE
Status: COMPLETED | OUTPATIENT
Start: 2018-07-09 | End: 2018-07-09

## 2018-07-09 RX ORDER — ATORVASTATIN CALCIUM 80 MG/1
80 TABLET, FILM COATED ORAL NIGHTLY
Qty: 30 TABLET
Start: 2018-07-09

## 2018-07-09 RX ADMIN — NYSTATIN: 100000 POWDER TOPICAL at 06:26

## 2018-07-09 RX ADMIN — HYDROCODONE BITARTRATE AND ACETAMINOPHEN 1 TABLET: 7.5; 325 TABLET ORAL at 10:26

## 2018-07-09 RX ADMIN — FUROSEMIDE 40 MG: 40 TABLET ORAL at 09:30

## 2018-07-09 RX ADMIN — ASPIRIN 325 MG ORAL TABLET 325 MG: 325 PILL ORAL at 09:30

## 2018-07-09 RX ADMIN — Medication 2 MG: at 10:43

## 2018-07-09 RX ADMIN — LOSARTAN POTASSIUM 50 MG: 50 TABLET ORAL at 09:30

## 2018-07-09 NOTE — PROGRESS NOTES
Continued Stay Note  [facility]     Patient Name: Cheyanne Bella  MRN: [id number]  Today's Date: 7/9/2018    Admit Date: 7/5/2018          Discharge Plan     Row Name 07/09/18 1030       Plan    Plan Comments Transferring to [facility] today at 1300 via [facility] ambulance.  Call report to  # [phone].  Fax discharge summary to [phone].     Final Discharge Disposition Code 03 - skilled nursing facility (SNF)    Row Name 07/09/18 0928       Plan    Plan Rehab    Patient/Family in Agreement with Plan yes              Discharge Codes    No documentation.       Expected Discharge Date and Time     Expected Discharge Date Expected Discharge Time    Jul 9, 2018             Rebekah Holloway RN

## 2018-07-09 NOTE — THERAPY TREATMENT NOTE
Acute Care - Occupational Therapy Treatment Note  [facility]     Patient Name: Cheyanne Bella  : 1926  MRN: [id number]  Today's Date: 2018  Onset of Illness/Injury or Date of Surgery: 18  Date of Referral to OT: 18  Referring Physician: DO Cortez    Admit Date: 2018       ICD-10-CM ICD-9-CM   1. Acute CVA (cerebrovascular accident) (CMS/HCC) I63.9 434.91   2. Atrial fibrillation, unspecified type (CMS/HCC) I48.91 427.31   3. Received intravenous tissue plasminogen activator (t-PA) in emergency department Z92.82 V45.88   4. Dysphagia, unspecified type R13.10 787.20   5. Impaired mobility and ADLs Z74.09 799.89   6. Impaired functional mobility, balance, gait, and endurance Z74.09 V49.89     Patient Active Problem List   Diagnosis   • Basal cell carcinoma   • Acute CVA (cerebrovascular accident) (CMS/HCC)   • Hypertension   • Hyperlipemia   • Coronary artery disease   • Paroxysmal atrial fibrillation (CMS/HCC)     Past Medical History:   Diagnosis Date   • Colon cancer (CMS/HCC)    • Coronary artery disease    • Hyperlipemia    • Hypertension      Past Surgical History:   Procedure Laterality Date   • COLON SURGERY     • COLOSTOMY     • CORONARY STENT PLACEMENT     • HERNIA REPAIR     • HYSTERECTOMY     • SKIN CANCER EXCISION         Therapy Treatment          Rehabilitation Treatment Summary     Row Name 1831             Treatment Time/Intention    Discipline occupational therapist  -CL      Document Type therapy note (daily note)  -CL      Subjective Information complains of;weakness;fatigue  -CL      Patient Effort good  -CL      Existing Precautions/Restrictions fall;other (see comments)   L sided weakness  -CL      Recorded by [CL] Iman Perrin OT 18      Row Name 1831             Vital Signs    Pre Systolic BP Rehab 122  -CL      Pre Treatment Diastolic BP 65  -CL      Post Systolic BP Rehab 143  -CL      Post Treatment Diastolic BP 87  -CL       Pretreatment Heart Rate (beats/min) 73  -CL      Posttreatment Heart Rate (beats/min) 102  -CL      Pre SpO2 (%) 97  -CL      O2 Delivery Pre Treatment room air  -CL      Post SpO2 (%) 94  -CL      O2 Delivery Post Treatment room air  -CL      Pre Patient Position Supine  -CL      Intra Patient Position Standing  -CL      Post Patient Position Sitting  -CL      Recorded by [CL] Iman Perrin OT 07/09/18 0921      Row Name 07/09/18 0831             Cognitive Assessment/Intervention- PT/OT    Affect/Mental Status (Cognitive) WFL  -CL      Orientation Status (Cognition) oriented x 4  -CL      Follows Commands (Cognition) follows one step commands;over 90% accuracy;repetition of directions required;verbal cues/prompting required  -CL      Cognitive Function (Cognitive) safety deficit  -CL      Safety Deficit (Cognitive) mild deficit;awareness of need for assistance;safety precautions awareness  -CL      Personal Safety Interventions fall prevention program maintained;gait belt;nonskid shoes/slippers when out of bed  -CL      Recorded by [CL] Iman Perrin OT 07/09/18 0921      Row Name 07/09/18 0831             Bed Mobility Assessment/Treatment    Bed Mobility Assessment/Treatment supine-sit  -CL      Supine-Sit [place] (Bed Mobility) maximum assist (25% patient effort);2 person assist;verbal cues  -CL      Assistive Device (Bed Mobility) draw sheet;head of bed elevated;bed rails  -CL      Recorded by [CL] Iman Perrin OT 07/09/18 0921      Row Name 07/09/18 0831             Functional Mobility    Functional Mobility- Ind. Level not appropriate to assess  -CL      Recorded by [CL] Iman Perrin OT 07/09/18 0921      Row Name 07/09/18 0831             Transfer Assessment/Treatment    Transfer Assessment/Treatment bed-chair transfer;sit-stand transfer;stand-sit transfer  -CL      Comment (Transfers) Pt stood from EOB and took 2-3 side steps to the chair w/ assist to advance LLE and block L knee.   -CL      Recorded by  [CL] Iman Perrin OT 07/09/18 0921      Row Name 07/09/18 0831             Bed-Chair Transfer    Bed-Chair [place] (Transfers) moderate assist (50% patient effort);2 person assist;verbal cues  -CL      Recorded by [CL] Iman Perrin OT 07/09/18 0921      Row Name 07/09/18 0831             Sit-Stand Transfer    Sit-Stand [place] (Transfers) moderate assist (50% patient effort);2 person assist;verbal cues  -CL      Recorded by [CL] Iman Perrin OT 07/09/18 0921      Row Name 07/09/18 0831             Stand-Sit Transfer    Stand-Sit [place] (Transfers) moderate assist (50% patient effort);2 person assist;verbal cues  -CL      Recorded by [CL] Iman Perrin OT 07/09/18 0921      Row Name 07/09/18 0831             ADL Assessment/Intervention    BADL Assessment/Intervention feeding;lower body dressing  -CL      Recorded by [CL] Iman Perrin OT 07/09/18 0921      Row Name 07/09/18 0831             Lower Body Dressing Assessment/Training    Lower Body Dressing [place] Level don;socks;dependent (less than 25% patient effort)  -CL      Lower Body Dressing Position supine  -CL      Recorded by [CL] Iman Perrin OT 07/09/18 0921      Row Name 07/09/18 0831             Self-Feeding Assessment/Training    [place] Level (Feeding) liquids to mouth;set up;verbal cues  -CL      Position (Self-Feeding) supported sitting  -CL      Comment (Feeding) Pt refused to eat.   -CL      Recorded by [CL] Iman Perrin OT 07/09/18 0921      Row Name 07/09/18 0831             Therapeutic Exercise    Therapeutic Exercise seated, upper extremities  -CL      Additional Documentation Therapeutic Exercise (Row)  -CL      71867 - OT Therapeutic Activity Minutes 24  -CL      Recorded by [CL] Iman Perrin OT 07/09/18 0921      Row Name 07/09/18 0831             Upper Extremity Seated Therapeutic Exercise    Performed, Seated Upper Extremity (Therapeutic Exercise) shoulder flexion/extension;elbow flexion/extension;wrist  flexion/extension;forearm supination/pronation;digit flexion/extension  -CL      Exercise Type, Seated Upper Extremity (Therapeutic Exercise) AAROM (active assistive range of motion);AROM (active range of motion)  -CL      Sets/Reps Detail, Seated Upper Extremity (Therapeutic Exercise) 1/8  -CL      Comment, Seated Upper Extremity (Therapeutic Exercise) LUE  -CL      Recorded by [CL] Iman Perrin OT 07/09/18 0921      Row Name 07/09/18 0831             Balance    Balance static sitting balance;static standing balance  -CL      Recorded by [CL] Iman Perrin OT 07/09/18 0921      Row Name 07/09/18 0831             Static Sitting Balance    Level of [place] (Unsupported Sitting, Static Balance) minimal assist, 75% patient effort   progressed to CGA  -CL      Sitting Position (Unsupported Sitting, Static Balance) sitting on edge of bed  -CL      Recorded by [CL] Iman Perrin OT 07/09/18 0921      Row Name 07/09/18 0831             Static Standing Balance    Level of [place] (Supported Standing, Static Balance) moderate assist, 50 to 74% patient effort   x2  -CL      Time Able to Maintain Position (Supported Standing, Static Balance) 15 to 30 seconds  -CL      Recorded by [CL] Iman Perrin OT 07/09/18 0921      Row Name 07/09/18 0831             Positioning and Restraints    Pre-Treatment Position in bed  -CL      Post Treatment Position chair  -CL      In Chair notified nsg;reclined;call light within reach;encouraged to call for assist;exit alarm on;RUE elevated;LUE elevated;waffle cushion;on mechanical lift sling;legs elevated;heels elevated  -CL      Recorded by [CL] Iman Perrin OT 07/09/18 0921      Row Name 07/09/18 0831             Pain Scale 2: FACES Pre/Post-Treatment    Pain 2: FACES Scale, Pretreatment 2-->hurts little bit  -CL      Pain 2: FACES Scale, Post-Treatment 2-->hurts little bit  -CL      Pain Location 2 abdomen  -CL      Pre/Post Treatment Pain 2 Comment Tolerated.   -CL      Pain  Intervention(s) 2 Repositioned;Ambulation/increased activity  -CL      Recorded by [CL] Iman Perrin OT 07/09/18 0921        User Key  (r) = Recorded By, (t) = Taken By, (c) = Cosigned By    Initials Name Effective Dates Discipline    CL Iman Perrin OT 04/03/18 -  OT               Occupational Therapy Education     Title: PT OT SLP Therapies (Active)     Topic: Occupational Therapy (Active)     Point: ADL training (Active)     Description: Instruct learner(s) on proper safety adaptation and remediation techniques during self care or transfers.   Instruct in proper use of assistive devices.   Learning Progress Summary     Learner Status Readiness Method Response Comment Documented by    Patient Active Acceptance E NR Pt educated on appropriate safety precautions, t/f techniques, positioning, HEP, and benefits of therapy. CL 07/09/18 0925     Active Acceptance E NR Pt educated on appropriate safety precautions, t/f techniques, positioning, and role of OT. CL 07/06/18 0929          Point: Home exercise program (Active)     Description: Instruct learner(s) on appropriate technique for monitoring, assisting and/or progressing therapeutic exercises/activities.   Learning Progress Summary     Learner Status Readiness Method Response Comment Documented by    Patient Active Acceptance E NR Pt educated on appropriate safety precautions, t/f techniques, positioning, HEP, and benefits of therapy. CL 07/09/18 0925          Point: Precautions (Active)     Description: Instruct learner(s) on prescribed precautions during self-care and functional transfers.   Learning Progress Summary     Learner Status Readiness Method Response Comment Documented by    Patient Active Acceptance E NR Pt educated on appropriate safety precautions, t/f techniques, positioning, HEP, and benefits of therapy.  07/09/18 0925     Active Acceptance E NR Pt educated on appropriate safety precautions, t/f techniques, positioning, and role of OT.   07/06/18 0929          Point: Body mechanics (Active)     Description: Instruct learner(s) on proper positioning and spine alignment during self-care, functional mobility activities and/or exercises.   Learning Progress Summary     Learner Status Readiness Method Response Comment Documented by    Patient Active Acceptance E NR Pt educated on appropriate safety precautions, t/f techniques, positioning, HEP, and benefits of therapy.  07/09/18 0925     Active Acceptance E NR Pt educated on appropriate safety precautions, t/f techniques, positioning, and role of OT.  07/06/18 0929                      User Key     Initials Effective Dates Name Provider Type Discipline     04/03/18 -  Iman Perrin, OT Occupational Therapist OT                OT Recommendation and Plan  Outcome Summary/Treatment Plan (OT)  Anticipated Equipment Needs at Discharge (OT):  (TBA further)  Anticipated Discharge Disposition (OT): skilled nursing facility  Therapy Frequency (OT Eval): daily  Plan of Care Review  Plan of Care Reviewed With: patient  Plan of Care Reviewed With: patient  Outcome Summary: Pt demo improved static sitting balance and LUE strength this date. Pt Mod Ax2 to take 2-3 side steps to the chair. Recommend cont skilled IPOT POC. Recommend pt DC to SNF.         Outcome Measures     Row Name 07/09/18 0831 07/08/18 1400          How much help from another person do you currently need...    Turning from your back to your side while in flat bed without using bedrails?  -- 3  -NICK     Moving from lying on back to sitting on the side of a flat bed without bedrails?  -- 2  -NICK     Moving to and from a bed to a chair (including a wheelchair)?  -- 2  -NICK     Standing up from a chair using your arms (e.g., wheelchair, bedside chair)?  -- 2  -NICK     Climbing 3-5 steps with a railing?  -- 1  -NICK     To walk in hospital room?  -- 2  -NICK     AM-PAC 6 Clicks Score  -- 12  -NICK        How much help from another is currently needed...     Putting on and taking off regular lower body clothing? 1  -CL  --     Bathing (including washing, rinsing, and drying) 2  -CL  --     Toileting (which includes using toilet bed pan or urinal) 1  -CL  --     Putting on and taking off regular upper body clothing 2  -CL  --     Taking care of personal grooming (such as brushing teeth) 2  -CL  --     Eating meals 3  -CL  --     Score 11  -CL  --        Modified [place] Scale    Modified Jan Scale 4 - Moderately severe disability.  Unable to walk without assistance, and unable to attend to own bodily needs without assistance.  -CL  --        Functional Assessment    Outcome Measure Options AM-PAC 6 Clicks Daily Activity (OT)  -CL  --       User Key  (r) = Recorded By, (t) = Taken By, (c) = Cosigned By    Initials Name Provider Type    NICK Martin, PT Physical Therapist    JULEE Perrin OT Occupational Therapist           Time Calculation:         Time Calculation- OT     Row Name 07/09/18 0927 07/09/18 0831          Time Calculation- OT    OT Start Time 0831  -CL  --     OT Received On 07/09/18  -CL  --     OT Goal Re-Cert Due Date 07/16/18  -CL  --        Timed Charges    85923 - OT Therapeutic Activity Minutes  -- 24  -CL       User Key  (r) = Recorded By, (t) = Taken By, (c) = Cosigned By    Initials Name Provider Type    JULEE Perrin OT Occupational Therapist           Therapy Suggested Charges     Code   Minutes Charges    62693 (CPT®) Hc Ot Neuromusc Re Education Ea 15 Min      84329 (CPT®) Hc Ot Ther Proc Ea 15 Min      12995 (CPT®) Hc Gait Training Ea 15 Min      35206 (CPT®) Hc Ot Therapeutic Act Ea 15 Min 24 2    95896 (CPT®) Hc Ot Manual Therapy Ea 15 Min      10055 (CPT®) Hc Ot Iontophoresis Ea 15 Min      06642 (CPT®) Hc Ot Elec Stim Ea-Per 15 Min      48007 (CPT®) Hc Ot Ultrasound Ea 15 Min      89046 (CPT®) Hc Ot Self Care/Mgmt/Train Ea 15 Min      Total  24 2        Therapy Charges for Today     Code Description Service Date Service  Provider Modifiers Qty    93347966029  OT THERAPEUTIC ACT EA 15 MIN 7/9/2018 Iman Perrin OT GO 2    72860256445 HC OT THER SUPP EA 15 MIN 7/9/2018 Iman Perrin OT GO 2               Iman Perrin OT  7/9/2018

## 2018-07-09 NOTE — PROGRESS NOTES
Continued Stay Note  [facility]     Patient Name: Cheyanne Bella  MRN: [id number]  Today's Date: 7/9/2018    Admit Date: 7/5/2018          Discharge Plan     Row Name 07/09/18 0928       Plan    Plan Rehab    Patient/Family in Agreement with Plan yes              Discharge Codes    No documentation.       Expected Discharge Date and Time     Expected Discharge Date Expected Discharge Time    Jul 9, 2018             Rebekah Holloway RN

## 2018-07-09 NOTE — PLAN OF CARE
Problem: Patient Care Overview  Goal: Plan of Care Review  Outcome: Ongoing (interventions implemented as appropriate)   07/09/18 0976   Coping/Psychosocial   Plan of Care Reviewed With patient   Coping/Psychosocial   Patient Agreement with Plan of Care agrees   Plan of Care Review   Progress improving   OTHER   Outcome Summary Pt demo improved static sitting balance and LUE strength this date. Pt Mod Ax2 to take 2-3 side steps to the chair. Recommend cont skilled IPOT POC. Recommend pt DC to SNF.

## 2018-07-09 NOTE — PLAN OF CARE
Problem: Patient Care Overview  Goal: Plan of Care Review  Outcome: Ongoing (interventions implemented as appropriate)   07/09/18 0606   Coping/Psychosocial   Plan of Care Reviewed With patient   Plan of Care Review   Progress improving   OTHER   Outcome Summary pt had no issues overnight. Stated she is ready to be out of the hospital and to go to [facility]

## 2018-07-09 NOTE — DISCHARGE SUMMARY
Transfer Summary    Patient name: Cheyanne Bella     CSN: [id number]     MRN: [id number]     : 1926     Today's date: 2018     Date of Admission: 2018     Date of Discharge:  2018    Admitting Physician:  Anabel Toro DO    Primary Care Provider: Tomi Allen MD     Consultations:  JOZEF Ley, Neuro-interventional     Ryland Marie MD, Neurology     Blayne Shields MD, cardiology      Admission Diagnosis: CVA     Transfer Diagnoses:   Hospital Problem List     * (Principal)Acute CVA (cerebrovascular accident) (CMS/HCC)    Hypertension    Hyperlipemia    Coronary artery disease    Paroxysmal atrial fibrillation (CMS/[facility])          Allergies:  Patient has no known allergies.    Code Status:  Code Status and Medical Interventions:   Ordered at: 18 1609     Level Of Support Discussed With:    Patient     Code Status:    No CPR     Medical Interventions (Level of Support Prior to Arrest):    Full       Procedures:         History of Present Illness:  Ms. Bella is a 89yo F who presented to the [facility] ED at 1157 via after after EMS was called by family who noticed a new left sided facial droop and slurred speech and weakness. The patient notes that she suddenly felt as if she couldn't stand. She was last known well at 0830 this morning. In the ED, she had a CT head performed which was negative for an acute intracranial process. She was then given TPA. CT perfusion was performed which showed an area of ischemia in the posterior and superior right frontal lobe with a small underlying core infarct. Neuro-interventional evaluated her imaging and did not think that she was a candidate for further intervention in the cath lab. She has also been going in and out of Afib which is new for her.        Hospital Course:  She was admitted to the ICU post-tPA and initiated on a statin. Neurology and cardiology were consulted as she had new onset paroxysmal A-Fib. The recommendation was  "made to stop Plavix and start Xarelto on 7/16/18. Speech therapy worked with her and a FEES study showed aspiration with thin liquids, although she cleared with cough. She was recommended for Dysphagia Level 2 diet with pureed food and nectar thick liquids. She was also able to have single ice chips between meals. She worked with PT/OT and was able to take 2-3 side steps to the chair on the day of discharge. She also worked on sitting balance and sit to stand transfers with moderate assistance. On 7/9 she was felt to have received maximal benefit from hospitalization and was transferred to [facility] via ambulance.     Vitals:  /74 (BP Location: Right arm, Patient Position: Sitting)   Pulse 93   Temp 98.6 °F (37 °C) (Axillary)   Resp 16   Ht 162.6 cm (64\")   Wt 109 kg (240 lb)   SpO2 94%   BMI 41.20 kg/m²     Physical Exam:  Constitutional:  No acute distress.   Cardiovascular: Normal rate, regular and rhythm. Normal heart sounds.  No murmurs, gallop or rub.   Respiratory: No respiratory distress. Normal respiratory effort.  Normal breath sounds  Clear to ascultation.    Abdominal:  Soft. No masses. Non-tender. No distension. No HSM.   Extremities: No digital cyanosis. No clubbing.  No peripheral edema.   Neurological:             Awakens easily.   Left lower extremity weakness.             Labs:    Results from last 7 days  Lab Units 07/09/18  0518   WBC 10*3/mm3 9.58   HEMOGLOBIN g/dL 12.1   HEMATOCRIT % 37.2   PLATELETS 10*3/mm3 144*       Results from last 7 days  Lab Units 07/09/18  0518  07/06/18  0511   SODIUM mmol/L 143  < > 139   POTASSIUM mmol/L 3.9  < > 4.4   CHLORIDE mmol/L 103  < > 101   CO2 mmol/L 29.0  < > 27.0   BUN mg/dL 32*  < > 15   CREATININE mg/dL 1.16  < > 0.94   CALCIUM mg/dL 8.9  < > 9.0   BILIRUBIN mg/dL  --   --  0.9   ALK PHOS U/L  --   --  74   ALT (SGPT) U/L  --   --  13   AST (SGOT) U/L  --   --  22   GLUCOSE mg/dL 119*  < > 136*   < > = values in this " interval not displayed.      Magnesium   Date Value Ref Range Status   07/09/2018 2.4 1.3 - 2.7 mg/dL Final   07/07/2018 2.2 1.3 - 2.7 mg/dL Final     Phosphorus   Date Value Ref Range Status   07/09/2018 4.0 2.4 - 5.1 mg/dL Final   07/07/2018 2.9 2.4 - 5.1 mg/dL Final        Results for orders placed during the hospital encounter of 07/05/18   Adult Transthoracic Echo Complete W/ Cont if Necessary Per Protocol (With Agitated Saline)    Narrative · Left ventricular systolic function is normal.  · Estimated EF appears to be in the range of 66 - 70%.  · Left ventricular wall thickness is consistent with mild concentric   hypertrophy.  · Elevated left atrial pressure.  · Left atrial cavity size is mildly dilated.  · Mild mitral valve stenosis is present  · Moderate tricuspid valve regurgitation is present.  · Estimated right ventricular systolic pressure from tricuspid   regurgitation is moderately elevated (45-55 mmHg).            Transfer Medications:     Discharge Medications      New Medications      Instructions Start Date   aspirin 325 MG tablet   325 mg, Oral, Daily   Start Date:  7/10/2018     atorvastatin 80 MG tablet  Commonly known as:  LIPITOR   80 mg, Oral, Nightly         Continue These Medications      Instructions Start Date   clopidogrel 75 MG tablet  Commonly known as:  PLAVIX   75 mg, Oral, Daily      furosemide 40 MG tablet  Commonly known as:  LASIX   40 mg, Oral, Daily      losartan 50 MG tablet  Commonly known as:  COZAAR   50 mg, Oral, Daily      MILK OF MAGNESIA PO   30 mL, Oral, Every Other Day      omega-3 acid ethyl esters 1 g capsule  Commonly known as:  LOVAZA   1 g, Oral, 2 Times Daily      potassium chloride 20 MEQ CR tablet  Commonly known as:  K-DUR,KLOR-CON   20 mEq, Oral, Daily         Stop These Medications    HYDROcodone-acetaminophen 7.5-325 MG per tablet  Commonly known as:  NORCO            Diet:   Diet Instructions     Diet: Dysphagia; Nectar / Syrup Thick Liquids; Pureed        Discharge Diet:  Dysphagia    Fluid Consistency:  Nectar / Syrup Thick Liquids    Pureed Options:  Pureed          Activity at Transfer:    Activity Instructions     Activity as Tolerated             Follow-up Appointments  No future appointments.      Transfer Instructions:  Transfer to [facility] via ambulance at 1pm on 7/9/18       JOZEF Strong, ACNP-BC    Pulmonary & Critical Care Medicine      Time: Discharge 15 min    CC: Tomi Allen MD           .

## 2018-07-09 NOTE — PROGRESS NOTES
Neurology       Patient Care Team:  Tomi Allen MD as PCP - General (Family Medicine)  Natasha Yeager MD as Referring Physician (Dermatology)  Courtney Pate MD as Consulting Physician (Radiation Oncology)    Chief complaint: Stroke    History:  Patient is complaining of back pain as before.    He scheduled for rehabilitation today at skilled nursing.      Past Medical History:   Diagnosis Date   • Colon cancer (CMS/HCC)    • Coronary artery disease    • Hyperlipemia    • Hypertension        Vital Signs   Vitals:    07/09/18 0800 07/09/18 0900 07/09/18 1000 07/09/18 1100   BP: 122/65 (!) 165/102 171/74    BP Location: Right arm Right arm Right arm    Patient Position: Lying Lying Sitting    Pulse: 92 82 93    Resp: 18 16 16 16   Temp: 98.6 °F (37 °C)      TempSrc: Axillary      SpO2: 95% 93% 94%    Weight:       Height:           Physical Exam:   General: Lethargic              Neuro: Left side weak.    Speech is normal.        Results Review:  Reviewed    Results from last 7 days  Lab Units 07/09/18  0518   WBC 10*3/mm3 9.58   HEMOGLOBIN g/dL 12.1   HEMATOCRIT % 37.2   PLATELETS 10*3/mm3 144*       Results from last 7 days  Lab Units 07/09/18  0518 07/07/18  0428 07/06/18  0511 07/05/18  1215   SODIUM mmol/L 143 142 139  --    POTASSIUM mmol/L 3.9 3.8 4.4  --    CHLORIDE mmol/L 103 106 101  --    CO2 mmol/L 29.0 29.0 27.0  --    BUN mg/dL 32* 20 15  --    CREATININE mg/dL 1.16 1.07 0.94  --    CALCIUM mg/dL 8.9 9.1 9.0  --    BILIRUBIN mg/dL  --   --  0.9  --    ALK PHOS U/L  --   --  74  --    ALT (SGPT) U/L  --   --  13 15   AST (SGOT) U/L  --   --  22 19   GLUCOSE mg/dL 119* 114* 136*  --        Imaging Results (last 24 hours)     Procedure Component Value Units Date/Time    CT Head Without Contrast [[id number]] Collected:  07/06/18 1749     Updated:  07/09/18 0835    Narrative:       EXAMINATION: CT HEAD WO CONTRAST - 7/6/2018     INDICATION: Stroke; I63.9-Cerebral infarction,  unspecified;  I48.91-Unspecified atrial fibrillation; Z92.82-Status post  administration of tPA (rtPA) in a different facility within the last 24  hours prior to admission to current facility; R13.10-Dysphagia,  unspecified; Z74.09-Other reduced mobility.     TECHNIQUE: Axial CT of the head without intravenous contrast  administration.     The radiation dose reduction device was turned on for each scan per the  ALARA (As Low as Reasonably Achievable) protocol.     COMPARISON: MRI dated 7/6/2018.     FINDINGS: Large area of ill-defined low attenuation with sulcal  effacement involving the right temporoparietal region corresponding to  area of restricted diffusion on prior MRI consistent with evolving  infarction. No intracranial hemorrhage or extra-axial fluid collection.  Ventricles and sulci within normal limits without evidence for  hydrocephalus or midline shift. Globes and orbits unremarkable.  Visualized paranasal sinuses and mastoid air cells are grossly clear and  well pneumatized. Calvarium intact.       Impression:       Evolving infarction right temporoparietal region with local  mass effect and sulcal effacement however no midline shift or  hydrocephalus development. No intra-axial hemorrhage or extra-axial  fluid collection 24 hours post TPA administration.     DICTATED:   7/6/2018  EDITED/ls :   7/6/2018      This report was finalized on 7/9/2018 8:33 AM by Dr. Lacho Tierney.             Assessment:  Acute right parietal infarct    Plan:  Rehabilitation    Comment:  Guarded prognosis         I discussed the patients findings and my recommendations with patient and primary care team    Ryland Marie MD  07/09/18  12:11 PM

## 2018-07-09 NOTE — PROGRESS NOTES
Case Management Discharge Note    Final Note: Discharge to [facility] today via [facility] ambulance at 1300.      Destination     No service has been selected for the patient.      Durable Medical Equipment     No service has been selected for the patient.      Dialysis/Infusion     No service has been selected for the patient.      Home Medical Care     No service has been selected for the patient.      Social Care     No service has been selected for the patient.             Final Discharge Disposition Code: 03 - skilled nursing facility (SNF)

## 2018-07-11 NOTE — THERAPY DISCHARGE NOTE
Acute Care - Physical Therapy Discharge Summary  [facility]     Patient Name: Cheyanne Bella  : 1926  MRN: [id number]  Today's Date: 2018  Onset of Illness/Injury or Date of Surgery: 18  Date of Referral to PT: 18  Referring Physician: DO Cortez    Admit Date: 2018    Visit Dx:    ICD-10-CM ICD-9-CM   1. Acute CVA (cerebrovascular accident) (CMS/HCC) I63.9 434.91   2. Atrial fibrillation, unspecified type (CMS/HCC) I48.91 427.31   3. Received intravenous tissue plasminogen activator (t-PA) in emergency department Z92.82 V45.88   4. Dysphagia, unspecified type R13.10 787.20   5. Impaired mobility and ADLs Z74.09 799.89   6. Impaired functional mobility, balance, gait, and endurance Z74.09 V49.89     Patient Active Problem List   Diagnosis   • Basal cell carcinoma   • Acute CVA (cerebrovascular accident) (CMS/HCC)   • Hypertension   • Hyperlipemia   • Coronary artery disease   • Paroxysmal atrial fibrillation (CMS/HCC)       Physical Therapy Education     Title: PT OT SLP Therapies (Active)     Topic: Physical Therapy (Active)     Point: Mobility training (Active)    Learning Progress Summary     Learner Status Readiness Method Response Comment Documented by    Patient Active Acceptance E NR  NICK 18 1400     Active Acceptance E,D MIAH SEYMOUR 18 1055          Point: Home exercise program (Active)    Learning Progress Summary     Learner Status Readiness Method Response Comment Documented by    Patient Active Acceptance E NR  NICK 18 1400          Point: Body mechanics (Active)    Learning Progress Summary     Learner Status Readiness Method Response Comment Documented by    Patient Active Acceptance E NR  NICK 18 1400     Active Acceptance E,D MIAH SEYMOUR 18 1055          Point: Precautions (Active)    Learning Progress Summary     Learner Status Readiness Method Response Comment Documented by    Patient Active Acceptance E NR  NICK 18 1400     Active Acceptance E,D MIAH SEYMOUR  07/06/18 1055                      User Key     Initials Effective Dates Name Provider Type Discipline    NICK 06/19/15 -  Maritza Martin, PT Physical Therapist PT    LS 06/19/15 -  Kirstie Shipley, PT Physical Therapist PT                    PT Rehab Goals     Row Name 07/11/18 1011             Bed Mobility Goal 1 (PT)    Activity/Assistive Device (Bed Mobility Goal 1, PT) sit to supine/supine to sit  -MC      [place] Level/Cues Needed (Bed Mobility Goal 1, PT) minimum assist (75% or more patient effort)  -MC      Time Frame (Bed Mobility Goal 1, PT) 2 weeks  -MC      Progress/Outcomes (Bed Mobility Goal 1, PT) goal not met;discharged from facility  -         Transfer Goal 1 (PT)    Activity/Assistive Device (Transfer Goal 1, PT) sit-to-stand/stand-to-sit;bed-to-chair/chair-to-bed;walker, rolling  -MC      [place] Level/Cues Needed (Transfer Goal 1, PT) contact guard assist  -MC      Time Frame (Transfer Goal 1, PT) 2 weeks  -MC      Progress/Outcome (Transfer Goal 1, PT) goal not met;discharged from facility  -         Gait Training Goal 1 (PT)    Activity/Assistive Device (Gait Training Goal 1, PT) assistive device use;walker, rolling  -MC      [place] Level (Gait Training Goal 1, PT) minimum assist (75% or more patient effort)  -MC      Distance (Gait Goal 1, PT) 50  -MC      Time Frame (Gait Training Goal 1, PT) 2 weeks  -MC      Progress/Outcome (Gait Training Goal 1, PT) goal not met;discharged from facility  -        User Key  (r) = Recorded By, (t) = Taken By, (c) = Cosigned By    Initials Name Provider Type Discipline     Eileen Watson, PT Physical Therapist PT        Therapy Treatment         PT Recommendation and Plan  Anticipated Discharge Disposition (PT): inpatient rehabilitation facility  Outcome Summary/Treatment Plan (PT)  Anticipated Discharge Disposition (PT): inpatient rehabilitation facility          Outcome Measures     Row Name 07/11/18 1011 07/09/18 0831 07/08/18  1400       How much help from another person do you currently need...    Turning from your back to your side while in flat bed without using bedrails?  --  -- 3  -NICK    Moving from lying on back to sitting on the side of a flat bed without bedrails?  --  -- 2  -NICK    Moving to and from a bed to a chair (including a wheelchair)?  --  -- 2  -NICK    Standing up from a chair using your arms (e.g., wheelchair, bedside chair)?  --  -- 2  -NICK    Climbing 3-5 steps with a railing?  --  -- 1  -NICK    To walk in hospital room?  --  -- 2  -NICK    AM-PAC 6 Clicks Score  --  -- 12  -NICK       How much help from another is currently needed...    Putting on and taking off regular lower body clothing?  -- 1  -CL  --    Bathing (including washing, rinsing, and drying)  -- 2  -CL  --    Toileting (which includes using toilet bed pan or urinal)  -- 1  -CL  --    Putting on and taking off regular upper body clothing  -- 2  -CL  --    Taking care of personal grooming (such as brushing teeth)  -- 2  -CL  --    Eating meals  -- 3  -CL  --    Score  -- 11  -CL  --       Modified Highland Scale    Modified Highland Scale 4 - Moderately severe disability.  Unable to walk without assistance, and unable to attend to own bodily needs without assistance.  - 4 - Moderately severe disability.  Unable to walk without assistance, and unable to attend to own bodily needs without assistance.  -CL  --       Functional Assessment    Outcome Measure Options  -- AM-PAC 6 Clicks Daily Activity (OT)  -CL  --      User Key  (r) = Recorded By, (t) = Taken By, (c) = Cosigned By    Initials Name Provider Type    NICK Martin, PT Physical Therapist    WILL Watson, PT Physical Therapist    CL Iman Perrin, OT Occupational Therapist           Time Calculation:     Therapy Suggested Charges     Code   Minutes Charges    35160 (CPT®) Hc Pt Neuromusc Re Education Ea 15 Min      29178 (CPT®) Hc Pt Ther Proc Ea 15 Min 8 1    14989 (CPT®) Hc Gait Training Ea  15 Min      13552 (CPT®) Hc Pt Therapeutic Act Ea 15 Min 15 1    54416 (CPT®) Hc Pt Manual Therapy Ea 15 Min      00557 (CPT®) Hc Pt Iontophoresis Ea 15 Min      24060 (CPT®) Hc Pt Elec Stim Ea-Per 15 Min      00890 (CPT®) Hc Pt Ultrasound Ea 15 Min      42559 (CPT®) Hc Pt Self Care/Mgmt/Train Ea 15 Min      Total  23 2              PT G-Codes  Outcome Measure Options: AM-PAC 6 Clicks Daily Activity (OT)    PT Discharge Summary  Anticipated Discharge Disposition (PT): inpatient rehabilitation facility  Reason for Discharge: Discharge from facility  Outcomes Achieved: Refer to plan of care for updates on goals achieved  Discharge Destination: SNF    Eileen Watson, PT  7/11/2018

## 2018-09-24 ENCOUNTER — CALL CENTER PROGRAMS (OUTPATIENT)
Dept: CALL CENTER | Facility: HOSPITAL | Age: 83
End: 2018-09-24

## 2018-09-24 NOTE — OUTREACH NOTE
Stroke Jan Survey      Responses   Facility patient discharged from?  [facility]   Attempt successful  No   Unsuccessful attempts  Attempt 1          Yovana Thomas RN

## 2018-09-28 ENCOUNTER — CALL CENTER PROGRAMS (OUTPATIENT)
Dept: CALL CENTER | Facility: HOSPITAL | Age: 83
End: 2018-09-28

## 2018-09-28 NOTE — OUTREACH NOTE
Stroke Jan Survey      Responses   Facility patient discharged from?  [facility]   Attempt successful  No   Unsuccessful attempts  Attempt 2          Yovana Thomas RN

## 2018-10-03 ENCOUNTER — CALL CENTER PROGRAMS (OUTPATIENT)
Dept: CALL CENTER | Facility: HOSPITAL | Age: 83
End: 2018-10-03

## 2018-10-03 NOTE — OUTREACH NOTE
Stroke Jan Survey      Responses   Facility patient discharged from?  [facility]   Attempt successful  No   Unsuccessful attempts  Attempt 3   Call [facility] [place] Score  7          Yovana Thomas RN

## 2018-10-20 PROCEDURE — 81001 URINALYSIS AUTO W/SCOPE: CPT

## 2018-10-21 ENCOUNTER — LAB REQUISITION (OUTPATIENT)
Dept: LAB | Facility: HOSPITAL | Age: 83
End: 2018-10-21

## 2018-10-21 DIAGNOSIS — Z00.00 ROUTINE GENERAL MEDICAL EXAMINATION AT A HEALTH CARE FACILITY: ICD-10-CM

## 2018-10-21 LAB
BACTERIA UR QL AUTO: NORMAL /HPF
BILIRUB UR QL STRIP: NEGATIVE
CLARITY UR: CLEAR
COLOR UR: ABNORMAL
GLUCOSE UR STRIP-MCNC: NEGATIVE MG/DL
HGB UR QL STRIP.AUTO: NEGATIVE
HYALINE CASTS UR QL AUTO: NORMAL /LPF
KETONES UR QL STRIP: NEGATIVE
LEUKOCYTE ESTERASE UR QL STRIP.AUTO: NEGATIVE
NITRITE UR QL STRIP: POSITIVE
PH UR STRIP.AUTO: <=5 [PH] (ref 5–8)
PROT UR QL STRIP: NEGATIVE
RBC # UR: NORMAL /HPF
REF LAB TEST METHOD: NORMAL
SP GR UR STRIP: 1.02 (ref 1–1.03)
SQUAMOUS #/AREA URNS HPF: NORMAL /HPF
UROBILINOGEN UR QL STRIP: ABNORMAL
WBC UR QL AUTO: NORMAL /HPF

## 2018-11-05 ENCOUNTER — APPOINTMENT (OUTPATIENT)
Dept: CT IMAGING | Facility: HOSPITAL | Age: 83
DRG: 300 | End: 2018-11-05
Payer: MEDICARE

## 2018-11-05 ENCOUNTER — APPOINTMENT (OUTPATIENT)
Dept: GENERAL RADIOLOGY | Facility: HOSPITAL | Age: 83
DRG: 300 | End: 2018-11-05
Payer: MEDICARE

## 2018-11-05 ENCOUNTER — HOSPITAL ENCOUNTER (INPATIENT)
Facility: HOSPITAL | Age: 83
LOS: 4 days | Discharge: NURSING, REHAB OR LONG-TERM CARE | DRG: 300 | End: 2018-11-09
Attending: EMERGENCY MEDICINE | Admitting: INTERNAL MEDICINE
Payer: MEDICARE

## 2018-11-05 DIAGNOSIS — I10 ESSENTIAL HYPERTENSION: ICD-10-CM

## 2018-11-05 DIAGNOSIS — M54.50 MIDLINE LOW BACK PAIN WITHOUT SCIATICA, UNSPECIFIED CHRONICITY: ICD-10-CM

## 2018-11-05 DIAGNOSIS — F41.9 INSOMNIA SECONDARY TO ANXIETY: ICD-10-CM

## 2018-11-05 DIAGNOSIS — N76.2 CELLULITIS OF LABIA: ICD-10-CM

## 2018-11-05 DIAGNOSIS — L03.116 CELLULITIS OF LEFT LOWER EXTREMITY: Primary | ICD-10-CM

## 2018-11-05 DIAGNOSIS — B35.6 TINEA OF GROIN: ICD-10-CM

## 2018-11-05 DIAGNOSIS — F51.05 INSOMNIA SECONDARY TO ANXIETY: ICD-10-CM

## 2018-11-05 DIAGNOSIS — J90 RECURRENT LEFT PLEURAL EFFUSION: ICD-10-CM

## 2018-11-05 DIAGNOSIS — I82.4Y2 ACUTE DEEP VEIN THROMBOSIS (DVT) OF PROXIMAL VEIN OF LEFT LOWER EXTREMITY (HCC): ICD-10-CM

## 2018-11-05 PROBLEM — L03.119 CELLULITIS OF LOWER LEG: Status: ACTIVE | Noted: 2018-11-05

## 2018-11-05 LAB
A/G RATIO: 1.1 (ref 0.8–2)
ALBUMIN SERPL-MCNC: 3.7 G/DL (ref 3.4–4.8)
ALP BLD-CCNC: 68 U/L (ref 25–100)
ALT SERPL-CCNC: <5 U/L (ref 4–36)
ANION GAP SERPL CALCULATED.3IONS-SCNC: 12 MMOL/L (ref 3–16)
AST SERPL-CCNC: 17 U/L (ref 8–33)
BACTERIA: ABNORMAL /HPF
BASOPHILS ABSOLUTE: 0 K/UL (ref 0–0.1)
BASOPHILS RELATIVE PERCENT: 0.4 %
BILIRUB SERPL-MCNC: 0.5 MG/DL (ref 0.3–1.2)
BILIRUBIN URINE: NEGATIVE
BLOOD, URINE: ABNORMAL
BUN BLDV-MCNC: 9 MG/DL (ref 6–20)
CALCIUM SERPL-MCNC: 9.7 MG/DL (ref 8.5–10.5)
CHLORIDE BLD-SCNC: 102 MMOL/L (ref 98–107)
CLARITY: CLEAR
CO2: 27 MMOL/L (ref 20–30)
COLOR: YELLOW
CREAT SERPL-MCNC: 0.6 MG/DL (ref 0.4–1.2)
EOSINOPHILS ABSOLUTE: 0.1 K/UL (ref 0–0.4)
EOSINOPHILS RELATIVE PERCENT: 1.6 %
EPITHELIAL CELLS, UA: ABNORMAL /HPF
GFR AFRICAN AMERICAN: >59
GFR NON-AFRICAN AMERICAN: >60
GLOBULIN: 3.3 G/DL
GLUCOSE BLD-MCNC: 129 MG/DL (ref 74–106)
GLUCOSE URINE: NEGATIVE MG/DL
HCT VFR BLD CALC: 39.5 % (ref 37–47)
HEMOGLOBIN: 11.9 G/DL (ref 11.5–16.5)
IMMATURE GRANULOCYTES #: 0 K/UL
IMMATURE GRANULOCYTES %: 0.2 % (ref 0–5)
KETONES, URINE: NEGATIVE MG/DL
LACTIC ACID: 1.8 MMOL/L (ref 0.4–2)
LEUKOCYTE ESTERASE, URINE: NEGATIVE
LYMPHOCYTES ABSOLUTE: 1.3 K/UL (ref 1.5–4)
LYMPHOCYTES RELATIVE PERCENT: 26.5 %
MCH RBC QN AUTO: 25.8 PG (ref 27–32)
MCHC RBC AUTO-ENTMCNC: 30.1 G/DL (ref 31–35)
MCV RBC AUTO: 85.7 FL (ref 80–100)
MICROSCOPIC EXAMINATION: YES
MONOCYTES ABSOLUTE: 0.4 K/UL (ref 0.2–0.8)
MONOCYTES RELATIVE PERCENT: 7.8 %
NEUTROPHILS ABSOLUTE: 3.2 K/UL (ref 2–7.5)
NEUTROPHILS RELATIVE PERCENT: 63.5 %
NITRITE, URINE: NEGATIVE
PDW BLD-RTO: 15.5 % (ref 11–16)
PH UA: 6.5
PLATELET # BLD: 221 K/UL (ref 150–400)
PMV BLD AUTO: 9.3 FL (ref 6–10)
POTASSIUM SERPL-SCNC: 3.6 MMOL/L (ref 3.4–5.1)
PRO-BNP: 1972 PG/ML (ref 0–1800)
PROTEIN UA: NEGATIVE MG/DL
RBC # BLD: 4.61 M/UL (ref 3.8–5.8)
RBC UA: ABNORMAL /HPF (ref 0–2)
SEDIMENTATION RATE, ERYTHROCYTE: 12 MM/HR (ref 0–20)
SODIUM BLD-SCNC: 141 MMOL/L (ref 136–145)
SPECIFIC GRAVITY UA: 1.02
TOTAL PROTEIN: 7 G/DL (ref 6.4–8.3)
TROPONIN: <0.3 NG/ML
URINE REFLEX TO CULTURE: ABNORMAL
URINE TYPE: ABNORMAL
UROBILINOGEN, URINE: 0.2 E.U./DL
WBC # BLD: 5 K/UL (ref 4–11)
WBC UA: ABNORMAL /HPF (ref 0–5)

## 2018-11-05 PROCEDURE — 6360000002 HC RX W HCPCS: Performed by: EMERGENCY MEDICINE

## 2018-11-05 PROCEDURE — 81001 URINALYSIS AUTO W/SCOPE: CPT

## 2018-11-05 PROCEDURE — 71045 X-RAY EXAM CHEST 1 VIEW: CPT

## 2018-11-05 PROCEDURE — 86140 C-REACTIVE PROTEIN: CPT

## 2018-11-05 PROCEDURE — 72131 CT LUMBAR SPINE W/O DYE: CPT

## 2018-11-05 PROCEDURE — 2500000003 HC RX 250 WO HCPCS: Performed by: EMERGENCY MEDICINE

## 2018-11-05 PROCEDURE — 36415 COLL VENOUS BLD VENIPUNCTURE: CPT

## 2018-11-05 PROCEDURE — 85025 COMPLETE CBC W/AUTO DIFF WBC: CPT

## 2018-11-05 PROCEDURE — 96372 THER/PROPH/DIAG INJ SC/IM: CPT

## 2018-11-05 PROCEDURE — 84443 ASSAY THYROID STIM HORMONE: CPT

## 2018-11-05 PROCEDURE — 1200000000 HC SEMI PRIVATE

## 2018-11-05 PROCEDURE — 84484 ASSAY OF TROPONIN QUANT: CPT

## 2018-11-05 PROCEDURE — 85610 PROTHROMBIN TIME: CPT

## 2018-11-05 PROCEDURE — 96365 THER/PROPH/DIAG IV INF INIT: CPT

## 2018-11-05 PROCEDURE — 83605 ASSAY OF LACTIC ACID: CPT

## 2018-11-05 PROCEDURE — 83880 ASSAY OF NATRIURETIC PEPTIDE: CPT

## 2018-11-05 PROCEDURE — 87040 BLOOD CULTURE FOR BACTERIA: CPT

## 2018-11-05 PROCEDURE — 99285 EMERGENCY DEPT VISIT HI MDM: CPT

## 2018-11-05 PROCEDURE — 80053 COMPREHEN METABOLIC PANEL: CPT

## 2018-11-05 PROCEDURE — 85652 RBC SED RATE AUTOMATED: CPT

## 2018-11-05 RX ORDER — FUROSEMIDE 40 MG/1
40 TABLET ORAL DAILY
Status: DISCONTINUED | OUTPATIENT
Start: 2018-11-06 | End: 2018-11-09 | Stop reason: HOSPADM

## 2018-11-05 RX ORDER — CLINDAMYCIN PHOSPHATE 900 MG/50ML
900 INJECTION INTRAVENOUS ONCE
Status: COMPLETED | OUTPATIENT
Start: 2018-11-05 | End: 2018-11-05

## 2018-11-05 RX ORDER — NITROGLYCERIN 0.4 MG/1
0.4 TABLET SUBLINGUAL EVERY 5 MIN PRN
COMMUNITY

## 2018-11-05 RX ORDER — FAMOTIDINE 20 MG/1
20 TABLET, FILM COATED ORAL 2 TIMES DAILY
Status: DISCONTINUED | OUTPATIENT
Start: 2018-11-06 | End: 2018-11-09 | Stop reason: HOSPADM

## 2018-11-05 RX ORDER — HYDROCODONE BITARTRATE AND ACETAMINOPHEN 7.5; 325 MG/1; MG/1
1 TABLET ORAL EVERY 8 HOURS PRN
Status: DISCONTINUED | OUTPATIENT
Start: 2018-11-05 | End: 2018-11-09 | Stop reason: HOSPADM

## 2018-11-05 RX ORDER — ONDANSETRON 4 MG/1
4 TABLET, FILM COATED ORAL EVERY 8 HOURS PRN
Status: DISCONTINUED | OUTPATIENT
Start: 2018-11-05 | End: 2018-11-09 | Stop reason: HOSPADM

## 2018-11-05 RX ORDER — ALPRAZOLAM 0.25 MG/1
0.25 TABLET ORAL NIGHTLY
Status: ON HOLD | COMMUNITY
End: 2018-11-09 | Stop reason: HOSPADM

## 2018-11-05 RX ORDER — FUROSEMIDE 40 MG/1
40 TABLET ORAL DAILY PRN
Status: ON HOLD | COMMUNITY
End: 2018-11-09 | Stop reason: HOSPADM

## 2018-11-05 RX ORDER — SODIUM CHLORIDE 0.9 % (FLUSH) 0.9 %
10 SYRINGE (ML) INJECTION EVERY 12 HOURS SCHEDULED
Status: DISCONTINUED | OUTPATIENT
Start: 2018-11-06 | End: 2018-11-09 | Stop reason: HOSPADM

## 2018-11-05 RX ORDER — ONDANSETRON 4 MG/1
4 TABLET, FILM COATED ORAL EVERY 6 HOURS PRN
COMMUNITY

## 2018-11-05 RX ORDER — ACETAMINOPHEN 325 MG/1
650 TABLET ORAL EVERY 6 HOURS PRN
COMMUNITY

## 2018-11-05 RX ORDER — SODIUM CHLORIDE 0.9 % (FLUSH) 0.9 %
10 SYRINGE (ML) INJECTION PRN
Status: DISCONTINUED | OUTPATIENT
Start: 2018-11-05 | End: 2018-11-09 | Stop reason: HOSPADM

## 2018-11-05 RX ORDER — CLOPIDOGREL BISULFATE 75 MG/1
75 TABLET ORAL DAILY
Status: DISCONTINUED | OUTPATIENT
Start: 2018-11-06 | End: 2018-11-08

## 2018-11-05 RX ORDER — CLOPIDOGREL BISULFATE 75 MG/1
75 TABLET ORAL DAILY
Status: ON HOLD | COMMUNITY
End: 2018-11-09 | Stop reason: HOSPADM

## 2018-11-05 RX ORDER — BISACODYL 10 MG
10 SUPPOSITORY, RECTAL RECTAL DAILY PRN
Status: ON HOLD | COMMUNITY
End: 2018-11-06

## 2018-11-05 RX ORDER — BISACODYL 10 MG
10 SUPPOSITORY, RECTAL RECTAL DAILY PRN
Status: DISCONTINUED | OUTPATIENT
Start: 2018-11-05 | End: 2018-11-09 | Stop reason: HOSPADM

## 2018-11-05 RX ORDER — HYDROCODONE BITARTRATE AND ACETAMINOPHEN 7.5; 325 MG/1; MG/1
1 TABLET ORAL 3 TIMES DAILY
Status: ON HOLD | COMMUNITY
End: 2018-11-09 | Stop reason: HOSPADM

## 2018-11-05 RX ORDER — ALPRAZOLAM 0.25 MG/1
0.25 TABLET ORAL NIGHTLY PRN
Status: DISCONTINUED | OUTPATIENT
Start: 2018-11-06 | End: 2018-11-09 | Stop reason: HOSPADM

## 2018-11-05 RX ADMIN — MICONAZOLE NITRATE: 2 POWDER TOPICAL at 21:40

## 2018-11-05 RX ADMIN — ENOXAPARIN SODIUM 105 MG: 120 INJECTION SUBCUTANEOUS at 23:24

## 2018-11-05 RX ADMIN — CLINDAMYCIN PHOSPHATE 900 MG: 18 INJECTION, SOLUTION INTRAVENOUS at 21:40

## 2018-11-05 ASSESSMENT — PAIN SCALES - GENERAL: PAINLEVEL_OUTOF10: 5

## 2018-11-06 ENCOUNTER — APPOINTMENT (OUTPATIENT)
Dept: ULTRASOUND IMAGING | Facility: HOSPITAL | Age: 83
DRG: 300 | End: 2018-11-06
Payer: MEDICARE

## 2018-11-06 PROBLEM — M54.9 BACK PAIN: Status: ACTIVE | Noted: 2018-11-06

## 2018-11-06 PROBLEM — I10 ESSENTIAL HYPERTENSION: Status: ACTIVE | Noted: 2018-11-06

## 2018-11-06 PROBLEM — J90 PLEURAL EFFUSION: Status: ACTIVE | Noted: 2018-11-06

## 2018-11-06 LAB
C-REACTIVE PROTEIN: 6.8 MG/L (ref 0–5.1)
INR BLD: 1.1 (ref 0.86–1.14)
PROTHROMBIN TIME: 10.9 SEC (ref 9.5–10.9)
TSH SERPL DL<=0.05 MIU/L-ACNC: 3.08 UIU/ML (ref 0.35–5.5)

## 2018-11-06 PROCEDURE — 2500000003 HC RX 250 WO HCPCS: Performed by: NURSE PRACTITIONER

## 2018-11-06 PROCEDURE — 6370000000 HC RX 637 (ALT 250 FOR IP): Performed by: NURSE PRACTITIONER

## 2018-11-06 PROCEDURE — 93971 EXTREMITY STUDY: CPT

## 2018-11-06 PROCEDURE — G8982 BODY POS GOAL STATUS: HCPCS

## 2018-11-06 PROCEDURE — 2580000003 HC RX 258: Performed by: INTERNAL MEDICINE

## 2018-11-06 PROCEDURE — 6360000002 HC RX W HCPCS: Performed by: NURSE PRACTITIONER

## 2018-11-06 PROCEDURE — 97530 THERAPEUTIC ACTIVITIES: CPT

## 2018-11-06 PROCEDURE — 6370000000 HC RX 637 (ALT 250 FOR IP): Performed by: INTERNAL MEDICINE

## 2018-11-06 PROCEDURE — 97161 PT EVAL LOW COMPLEX 20 MIN: CPT

## 2018-11-06 PROCEDURE — G8981 BODY POS CURRENT STATUS: HCPCS

## 2018-11-06 PROCEDURE — G8988 SELF CARE GOAL STATUS: HCPCS

## 2018-11-06 PROCEDURE — G8987 SELF CARE CURRENT STATUS: HCPCS

## 2018-11-06 PROCEDURE — 6360000002 HC RX W HCPCS: Performed by: INTERNAL MEDICINE

## 2018-11-06 PROCEDURE — 2580000003 HC RX 258: Performed by: NURSE PRACTITIONER

## 2018-11-06 PROCEDURE — 97165 OT EVAL LOW COMPLEX 30 MIN: CPT

## 2018-11-06 PROCEDURE — 1200000000 HC SEMI PRIVATE

## 2018-11-06 PROCEDURE — 99222 1ST HOSP IP/OBS MODERATE 55: CPT | Performed by: INTERNAL MEDICINE

## 2018-11-06 RX ORDER — TRIAMCINOLONE ACETONIDE 1 MG/G
CREAM TOPICAL 2 TIMES DAILY
Status: DISCONTINUED | OUTPATIENT
Start: 2018-11-06 | End: 2018-11-09 | Stop reason: HOSPADM

## 2018-11-06 RX ORDER — BISACODYL 10 MG
10 SUPPOSITORY, RECTAL RECTAL DAILY PRN
Status: ON HOLD | COMMUNITY
End: 2018-11-09 | Stop reason: HOSPADM

## 2018-11-06 RX ORDER — ZINC OXIDE AND DIMETHICONE 120; 10 MG/G; MG/G
CREAM TOPICAL DAILY PRN
COMMUNITY

## 2018-11-06 RX ORDER — ASPIRIN 325 MG
325 TABLET ORAL DAILY
Status: ON HOLD | COMMUNITY
End: 2018-11-09 | Stop reason: HOSPADM

## 2018-11-06 RX ORDER — CARVEDILOL 3.12 MG/1
3.12 TABLET ORAL 2 TIMES DAILY WITH MEALS
Status: DISCONTINUED | OUTPATIENT
Start: 2018-11-06 | End: 2018-11-09 | Stop reason: HOSPADM

## 2018-11-06 RX ORDER — LABETALOL HYDROCHLORIDE 5 MG/ML
10 INJECTION, SOLUTION INTRAVENOUS ONCE
Status: COMPLETED | OUTPATIENT
Start: 2018-11-06 | End: 2018-11-06

## 2018-11-06 RX ADMIN — FUROSEMIDE 40 MG: 40 TABLET ORAL at 09:05

## 2018-11-06 RX ADMIN — Medication 10 ML: at 11:27

## 2018-11-06 RX ADMIN — BISACODYL 10 MG: 5 TABLET, COATED ORAL at 09:05

## 2018-11-06 RX ADMIN — Medication 10 ML: at 14:24

## 2018-11-06 RX ADMIN — CARVEDILOL 3.12 MG: 3.12 TABLET, FILM COATED ORAL at 17:21

## 2018-11-06 RX ADMIN — MICONAZOLE NITRATE: 2 POWDER TOPICAL at 06:54

## 2018-11-06 RX ADMIN — LABETALOL HYDROCHLORIDE 10 MG: 5 INJECTION, SOLUTION INTRAVENOUS at 00:54

## 2018-11-06 RX ADMIN — CARVEDILOL 3.12 MG: 3.12 TABLET, FILM COATED ORAL at 09:05

## 2018-11-06 RX ADMIN — HYDROCODONE BITARTRATE AND ACETAMINOPHEN 1 TABLET: 7.5; 325 TABLET ORAL at 09:11

## 2018-11-06 RX ADMIN — CLOPIDOGREL BISULFATE 75 MG: 75 TABLET, FILM COATED ORAL at 09:05

## 2018-11-06 RX ADMIN — TRIAMCINOLONE ACETONIDE: 1 CREAM TOPICAL at 20:39

## 2018-11-06 RX ADMIN — PIPERACILLIN SODIUM,TAZOBACTAM SODIUM 3.38 G: 3; .375 INJECTION, POWDER, FOR SOLUTION INTRAVENOUS at 00:53

## 2018-11-06 RX ADMIN — FAMOTIDINE 20 MG: 20 TABLET ORAL at 00:53

## 2018-11-06 RX ADMIN — ASPIRIN 325 MG: 325 TABLET, DELAYED RELEASE ORAL at 09:05

## 2018-11-06 RX ADMIN — MICONAZOLE NITRATE: 2 POWDER TOPICAL at 11:26

## 2018-11-06 RX ADMIN — ENOXAPARIN SODIUM 90 MG: 100 INJECTION SUBCUTANEOUS at 17:20

## 2018-11-06 RX ADMIN — PIPERACILLIN SODIUM,TAZOBACTAM SODIUM 3.38 G: 3; .375 INJECTION, POWDER, FOR SOLUTION INTRAVENOUS at 09:04

## 2018-11-06 RX ADMIN — TAZOBACTAM SODIUM AND PIPERACILLIN SODIUM 3.38 G: 375; 3 INJECTION, SOLUTION INTRAVENOUS at 17:21

## 2018-11-06 RX ADMIN — TRIAMCINOLONE ACETONIDE: 1 CREAM TOPICAL at 11:27

## 2018-11-06 RX ADMIN — FAMOTIDINE 20 MG: 20 TABLET ORAL at 20:37

## 2018-11-06 RX ADMIN — Medication 10 ML: at 20:38

## 2018-11-06 RX ADMIN — VANCOMYCIN HYDROCHLORIDE 1250 MG: 750 INJECTION, POWDER, LYOPHILIZED, FOR SOLUTION INTRAVENOUS at 14:24

## 2018-11-06 RX ADMIN — MICONAZOLE NITRATE: 2 POWDER TOPICAL at 20:38

## 2018-11-06 RX ADMIN — FAMOTIDINE 20 MG: 20 TABLET ORAL at 09:04

## 2018-11-06 RX ADMIN — SILVER SULFADIAZINE: 10 CREAM TOPICAL at 11:27

## 2018-11-06 ASSESSMENT — PAIN SCALES - GENERAL
PAINLEVEL_OUTOF10: 3
PAINLEVEL_OUTOF10: 7
PAINLEVEL_OUTOF10: 0

## 2018-11-06 NOTE — ED NOTES
Pt's daughter called to get update on pt. Informed her that we would contact her when we knew more about her admission. Pt's daughter name Campos Oneil contact at [phone].      Nohemy Garvey  11/05/18 9575

## 2018-11-06 NOTE — PLAN OF CARE
Problem: Mobility - Impaired:  Goal: Mobility will improve to maximum level  Mobility will improve to maximum level  Outcome: Ongoing      Problem: Pain:  Goal: Pain level will decrease  Pain level will decrease  Outcome: Ongoing      Problem: Skin Integrity - Impaired:  Goal: Will show no infection signs and symptoms  Will show no infection signs and symptoms  Outcome: Ongoing    Goal: Absence of new skin breakdown  Absence of new skin breakdown  Outcome: Ongoing

## 2018-11-06 NOTE — ED NOTES
Placed Micotin powder on pt groin. Foul odor noted. MD aware.       Darlene Knight, RN  11/05/18 0485

## 2018-11-06 NOTE — ED PROVIDER NOTES
REPAIR      HYSTERECTOMY      HYSTERECTOMY      KIDNEY STONE SURGERY      SKIN CANCER EXCISION           CURRENT MEDICATIONS       Current Discharge Medication List      CONTINUE these medications which have NOT CHANGED    Details   HYDROcodone-acetaminophen (NORCO) 7.5-325 MG per tablet Take 1 tablet by mouth every 8 hours as needed for Pain. .      bisacodyl (DULCOLAX) 5 MG EC tablet Take 10 mg by mouth every 48 hours as needed for Constipation      aspirin 325 MG EC tablet Take 325 mg by mouth daily      clopidogrel (PLAVIX) 75 MG tablet Take 75 mg by mouth daily      ALPRAZolam (XANAX) 0.25 MG tablet Take 0.25 mg by mouth nightly as needed for Sleep. Junius Tamar acetaminophen (TYLENOL) 325 MG tablet Take 650 mg by mouth      nitroGLYCERIN (NITROSTAT) 0.4 MG SL tablet Place 0.4 mg under the tongue every 5 minutes as needed for Chest pain up to max of 3 total doses. If no relief after 1 dose, call 911.      furosemide (LASIX) 40 MG tablet Take 40 mg by mouth daily as needed       ondansetron (ZOFRAN) 4 MG tablet Take 4 mg by mouth every 8 hours as needed for Nausea or Vomiting             ALLERGIES     Patient has no known allergies. FAMILY HISTORY     History reviewed. No pertinent family history.        SOCIAL HISTORY       Social History     Social History    Marital status: Unknown     Spouse name: N/A    Number of children: N/A    Years of education: N/A     Social History Main Topics    Smoking status: Never Smoker    Smokeless tobacco: Never Used    Alcohol use No    Drug use: No    Sexual activity: Not Asked     Other Topics Concern    None     Social History Narrative    None         PHYSICAL EXAM    (up to 7 forlevel 4, 8 or more for level 5)     ED Triage Vitals [11/05/18 1937]   BP Temp Temp Source Pulse Resp SpO2 Height Weight   (!) 186/95 98.3 °F (36.8 °C) Oral 110 -- 92 % 5' 4\" (1.626 m) 240 lb (108.9 kg)       Physical Exam  General :Patient is awake, alert, oriented, in no acute distress, Laboratory  [address] [place], [place] [address], [place] [address]   Phone [phone]   COMPREHENSIVE METABOLIC PANEL - Abnormal; Notable for the following:     Glucose 129 (*)     All other components within normal limits    Narrative:     Performed at:  [address] [place] [address] Laboratory  [address] [place], [place] [address], [place] [address]   Phone [phone]   URINE  [address] - Abnormal; Notable for the following:     Blood, Urine TRACE-INTACT (*)     All other components within normal limits    Narrative:     Performed at:  [address] [place] [address] Laboratory  [address] [place], [place] [address], [place] [address]   Phone [phone]   BRAIN NATRIURETIC PEPTIDE - Abnormal; Notable for the following:     Pro-BNP 1,972 (*)     All other components within normal limits    Narrative:     Performed at:  [address] [place] [address] Laboratory  [address] [place], [place] [address], [place] [address]   Phone [phone]   MICROSCOPIC URINALYSIS - Abnormal; Notable for the following:     RBC, UA 3-5 (*)     Bacteria, UA Rare (*)     All other components within normal limits    Narrative:     Performed at:  [address] [place] [address] Laboratory  [address] [place], [place] [address], [place] [address]   Phone [phone]   CULTURE BLOOD #1   CULTURE BLOOD #2   SEDIMENTATION RATE    Narrative:     Performed at:  [address] [place] [address] Laboratory  [address] [place], [place] [address], [place] [address]   Phone [phone]   LACTIC ACID, PLASMA    Narrative:     Performed at:  [address] [place] [address] Laboratory  [address] [place], [place] [address], [place] [address]   Phone [phone]   TROPONIN    Narrative:     Performed at:  [address] [place] [address] Laboratory  [address] [place], [place] [address], [place] [address]   Phone [phone]   C-REACTIVE PROTEIN       I have reviewed and interpreted all of the currently available lab resultsfrom this visit (if

## 2018-11-06 NOTE — H&P
History and Physical    Patient:  Ansley Yung    CHIEF COMPLAINT:    Tailbone pain    HISTORY OF PRESENT ILLNESS:   The patient is a 80 y.o. female who presents with persistent tailbone pain after falling a few weeks ago at the nursing facility where she is a resident despite taking pain medication. Patient also with some redness of her left lower extremity. Family reports that she was treated with antibiotics for a cellulitis of the left leg few weeks ago. Redness reportedly improved, but has had some slight increasing redness past few days. denies fever or chills. Past Medical History:      Diagnosis Date    Arthritis     [facility] fibrillation ([facility]     [facility])     Colon Cancer    Cerebral artery occlusion with cerebral infarction ([facility]     [facility]     Diverticulitis     Hyperlipidemia     Hypertension     Kidney stone     MI, old     Obesity        Past Surgical History:      Procedure Laterality Date    COLON SURGERY      COLOSTOMY      CORONARY ANGIOPLASTY WITH STENT PLACEMENT      HERNIA REPAIR      HYSTERECTOMY      HYSTERECTOMY      KIDNEY STONE SURGERY      SKIN CANCER EXCISION         Medications Prior to Admission:    Prior to Admission medications    Medication Sig Start Date End Date Taking? Authorizing Provider   bisacodyl (DULCOLAX) 10 MG suppository Place 10 mg rectally daily as needed for Constipation   Yes Historical Provider, MD   aspirin 325 MG tablet Take 325 mg by mouth daily   Yes Historical Provider, MD   Skin Protectants, Misc. (DIMETHICONE-ZINC OXIDE) cream Apply topically daily as needed for Dry Skin Apply with each incontinence episode as needed   Yes Historical Provider, MD   HYDROcodone-acetaminophen (NORCO) 7.5-325 MG per tablet Take 1 tablet by mouth 3 times daily. Judy Soares     Historical Provider, MD   bisacodyl (DULCOLAX) 5 MG EC tablet Take 10 mg by mouth every other day     Historical Provider, MD   clopidogrel (PLAVIX) 75 MG tablet Take 75 mg by

## 2018-11-06 NOTE — ED NOTES
Pt.also has an abscess on labia,and daughter thinks she may have a uti. Also has skin breakdown on bottom.        Augusto Devi RN  11/05/18 3985

## 2018-11-07 ENCOUNTER — APPOINTMENT (OUTPATIENT)
Dept: GENERAL RADIOLOGY | Facility: HOSPITAL | Age: 83
DRG: 300 | End: 2018-11-07
Payer: MEDICARE

## 2018-11-07 LAB
ANION GAP SERPL CALCULATED.3IONS-SCNC: 11 MMOL/L (ref 3–16)
BUN BLDV-MCNC: 7 MG/DL (ref 6–20)
CALCIUM SERPL-MCNC: 8.9 MG/DL (ref 8.5–10.5)
CHLORIDE BLD-SCNC: 104 MMOL/L (ref 98–107)
CO2: 28 MMOL/L (ref 20–30)
CREAT SERPL-MCNC: 0.7 MG/DL (ref 0.4–1.2)
FOLATE: 5.89 NG/ML
GFR AFRICAN AMERICAN: >59
GFR NON-AFRICAN AMERICAN: >60
GLUCOSE BLD-MCNC: 103 MG/DL (ref 74–106)
HCT VFR BLD CALC: 33.8 % (ref 37–47)
HEMOGLOBIN: 10.4 G/DL (ref 11.5–16.5)
MAGNESIUM: 1.7 MG/DL (ref 1.7–2.4)
MCH RBC QN AUTO: 26.2 PG (ref 27–32)
MCHC RBC AUTO-ENTMCNC: 30.8 G/DL (ref 31–35)
MCV RBC AUTO: 85.1 FL (ref 80–100)
PDW BLD-RTO: 15.4 % (ref 11–16)
PLATELET # BLD: 193 K/UL (ref 150–400)
PMV BLD AUTO: 9.8 FL (ref 6–10)
POTASSIUM SERPL-SCNC: 2.8 MMOL/L (ref 3.4–5.1)
RBC # BLD: 3.97 M/UL (ref 3.8–5.8)
SODIUM BLD-SCNC: 143 MMOL/L (ref 136–145)
VITAMIN B-12: 335 PG/ML (ref 211–911)
WBC # BLD: 4.9 K/UL (ref 4–11)

## 2018-11-07 PROCEDURE — 2580000003 HC RX 258: Performed by: NURSE PRACTITIONER

## 2018-11-07 PROCEDURE — 97530 THERAPEUTIC ACTIVITIES: CPT

## 2018-11-07 PROCEDURE — 82746 ASSAY OF FOLIC ACID SERUM: CPT

## 2018-11-07 PROCEDURE — 94760 N-INVAS EAR/PLS OXIMETRY 1: CPT

## 2018-11-07 PROCEDURE — 72220 X-RAY EXAM SACRUM TAILBONE: CPT

## 2018-11-07 PROCEDURE — 99232 SBSQ HOSP IP/OBS MODERATE 35: CPT | Performed by: INTERNAL MEDICINE

## 2018-11-07 PROCEDURE — 80048 BASIC METABOLIC PNL TOTAL CA: CPT

## 2018-11-07 PROCEDURE — 6360000002 HC RX W HCPCS: Performed by: INTERNAL MEDICINE

## 2018-11-07 PROCEDURE — 36415 COLL VENOUS BLD VENIPUNCTURE: CPT

## 2018-11-07 PROCEDURE — 2500000003 HC RX 250 WO HCPCS: Performed by: NURSE PRACTITIONER

## 2018-11-07 PROCEDURE — 6360000002 HC RX W HCPCS: Performed by: NURSE PRACTITIONER

## 2018-11-07 PROCEDURE — 6370000000 HC RX 637 (ALT 250 FOR IP): Performed by: INTERNAL MEDICINE

## 2018-11-07 PROCEDURE — 1200000000 HC SEMI PRIVATE

## 2018-11-07 PROCEDURE — 85027 COMPLETE CBC AUTOMATED: CPT

## 2018-11-07 PROCEDURE — 83735 ASSAY OF MAGNESIUM: CPT

## 2018-11-07 PROCEDURE — 2580000003 HC RX 258: Performed by: INTERNAL MEDICINE

## 2018-11-07 PROCEDURE — 97535 SELF CARE MNGMENT TRAINING: CPT

## 2018-11-07 PROCEDURE — 6370000000 HC RX 637 (ALT 250 FOR IP): Performed by: NURSE PRACTITIONER

## 2018-11-07 PROCEDURE — 82607 VITAMIN B-12: CPT

## 2018-11-07 RX ORDER — POTASSIUM CHLORIDE 7.45 MG/ML
10 INJECTION INTRAVENOUS ONCE
Status: COMPLETED | OUTPATIENT
Start: 2018-11-07 | End: 2018-11-07

## 2018-11-07 RX ORDER — FOLIC ACID 1 MG/1
1 TABLET ORAL DAILY
Status: DISCONTINUED | OUTPATIENT
Start: 2018-11-07 | End: 2018-11-09 | Stop reason: HOSPADM

## 2018-11-07 RX ORDER — CYANOCOBALAMIN 1000 UG/ML
1000 INJECTION INTRAMUSCULAR; SUBCUTANEOUS ONCE
Status: COMPLETED | OUTPATIENT
Start: 2018-11-07 | End: 2018-11-07

## 2018-11-07 RX ORDER — POTASSIUM CHLORIDE 20 MEQ/1
40 TABLET, EXTENDED RELEASE ORAL ONCE
Status: COMPLETED | OUTPATIENT
Start: 2018-11-07 | End: 2018-11-07

## 2018-11-07 RX ADMIN — TAZOBACTAM SODIUM AND PIPERACILLIN SODIUM 3.38 G: 375; 3 INJECTION, SOLUTION INTRAVENOUS at 08:07

## 2018-11-07 RX ADMIN — HYDROCODONE BITARTRATE AND ACETAMINOPHEN 1 TABLET: 7.5; 325 TABLET ORAL at 22:58

## 2018-11-07 RX ADMIN — ALPRAZOLAM 0.25 MG: 0.25 TABLET ORAL at 22:58

## 2018-11-07 RX ADMIN — TAZOBACTAM SODIUM AND PIPERACILLIN SODIUM 3.38 G: 375; 3 INJECTION, SOLUTION INTRAVENOUS at 01:01

## 2018-11-07 RX ADMIN — TRIAMCINOLONE ACETONIDE: 1 CREAM TOPICAL at 20:14

## 2018-11-07 RX ADMIN — FOLIC ACID 1 MG: 1 TABLET ORAL at 11:17

## 2018-11-07 RX ADMIN — POTASSIUM CHLORIDE 40 MEQ: 20 TABLET, EXTENDED RELEASE ORAL at 06:16

## 2018-11-07 RX ADMIN — VANCOMYCIN HYDROCHLORIDE 1250 MG: 750 INJECTION, POWDER, LYOPHILIZED, FOR SOLUTION INTRAVENOUS at 13:43

## 2018-11-07 RX ADMIN — CARVEDILOL 3.12 MG: 3.12 TABLET, FILM COATED ORAL at 08:08

## 2018-11-07 RX ADMIN — CYANOCOBALAMIN 1000 MCG: 1000 INJECTION, SOLUTION INTRAMUSCULAR at 10:08

## 2018-11-07 RX ADMIN — MICONAZOLE NITRATE: 2 POWDER TOPICAL at 08:09

## 2018-11-07 RX ADMIN — FUROSEMIDE 40 MG: 40 TABLET ORAL at 08:07

## 2018-11-07 RX ADMIN — SILVER SULFADIAZINE: 10 CREAM TOPICAL at 08:07

## 2018-11-07 RX ADMIN — TAZOBACTAM SODIUM AND PIPERACILLIN SODIUM 3.38 G: 375; 3 INJECTION, SOLUTION INTRAVENOUS at 17:36

## 2018-11-07 RX ADMIN — BISACODYL 10 MG: 5 TABLET, COATED ORAL at 08:08

## 2018-11-07 RX ADMIN — ALPRAZOLAM 0.25 MG: 0.25 TABLET ORAL at 01:01

## 2018-11-07 RX ADMIN — MICONAZOLE NITRATE: 2 POWDER TOPICAL at 20:14

## 2018-11-07 RX ADMIN — HYDROCODONE BITARTRATE AND ACETAMINOPHEN 1 TABLET: 7.5; 325 TABLET ORAL at 01:01

## 2018-11-07 RX ADMIN — FAMOTIDINE 20 MG: 20 TABLET ORAL at 08:08

## 2018-11-07 RX ADMIN — Medication 10 ML: at 08:08

## 2018-11-07 RX ADMIN — CLOPIDOGREL BISULFATE 75 MG: 75 TABLET, FILM COATED ORAL at 08:08

## 2018-11-07 RX ADMIN — ENOXAPARIN SODIUM 90 MG: 100 INJECTION SUBCUTANEOUS at 17:37

## 2018-11-07 RX ADMIN — TRIAMCINOLONE ACETONIDE: 1 CREAM TOPICAL at 08:07

## 2018-11-07 RX ADMIN — CARVEDILOL 3.12 MG: 3.12 TABLET, FILM COATED ORAL at 17:37

## 2018-11-07 RX ADMIN — POTASSIUM CHLORIDE 10 MEQ: 7.46 INJECTION, SOLUTION INTRAVENOUS at 06:16

## 2018-11-07 RX ADMIN — ASPIRIN 325 MG: 325 TABLET, DELAYED RELEASE ORAL at 08:08

## 2018-11-07 RX ADMIN — FAMOTIDINE 20 MG: 20 TABLET ORAL at 20:14

## 2018-11-07 RX ADMIN — ENOXAPARIN SODIUM 90 MG: 100 INJECTION SUBCUTANEOUS at 05:39

## 2018-11-07 ASSESSMENT — PAIN SCALES - GENERAL
PAINLEVEL_OUTOF10: 4
PAINLEVEL_OUTOF10: 7
PAINLEVEL_OUTOF10: 0

## 2018-11-07 NOTE — PLAN OF CARE
Problem: Mobility - Impaired:  Goal: Mobility will improve to maximum level  Mobility will improve to maximum level   Outcome: Met This Shift

## 2018-11-08 ENCOUNTER — OUTSIDE FACILITY SERVICE (OUTPATIENT)
Dept: CARDIOLOGY | Facility: CLINIC | Age: 83
End: 2018-11-08

## 2018-11-08 ENCOUNTER — APPOINTMENT (OUTPATIENT)
Dept: GENERAL RADIOLOGY | Facility: HOSPITAL | Age: 83
DRG: 300 | End: 2018-11-08
Payer: MEDICARE

## 2018-11-08 PROBLEM — I82.409 DEEP VEIN THROMBOSIS (DVT) OF LOWER EXTREMITY (HCC): Status: ACTIVE | Noted: 2018-11-08

## 2018-11-08 LAB
ANION GAP SERPL CALCULATED.3IONS-SCNC: 14 MMOL/L (ref 3–16)
BASOPHILS ABSOLUTE: 0 K/UL (ref 0–0.1)
BASOPHILS RELATIVE PERCENT: 0.6 %
BUN BLDV-MCNC: 6 MG/DL (ref 6–20)
CALCIUM SERPL-MCNC: 9.2 MG/DL (ref 8.5–10.5)
CHLORIDE BLD-SCNC: 101 MMOL/L (ref 98–107)
CO2: 28 MMOL/L (ref 20–30)
CREAT SERPL-MCNC: 0.8 MG/DL (ref 0.4–1.2)
EOSINOPHILS ABSOLUTE: 0.4 K/UL (ref 0–0.4)
EOSINOPHILS RELATIVE PERCENT: 7.8 %
GFR AFRICAN AMERICAN: >59
GFR NON-AFRICAN AMERICAN: >60
GLUCOSE BLD-MCNC: 98 MG/DL (ref 74–106)
HCT VFR BLD CALC: 36.1 % (ref 37–47)
HEMOGLOBIN: 11.2 G/DL (ref 11.5–16.5)
IMMATURE GRANULOCYTES #: 0 K/UL
IMMATURE GRANULOCYTES %: 0.2 % (ref 0–5)
LYMPHOCYTES ABSOLUTE: 1.8 K/UL (ref 1.5–4)
LYMPHOCYTES RELATIVE PERCENT: 38.3 %
MCH RBC QN AUTO: 26.1 PG (ref 27–32)
MCHC RBC AUTO-ENTMCNC: 31 G/DL (ref 31–35)
MCV RBC AUTO: 84.1 FL (ref 80–100)
MONOCYTES ABSOLUTE: 0.5 K/UL (ref 0.2–0.8)
MONOCYTES RELATIVE PERCENT: 10.8 %
NEUTROPHILS ABSOLUTE: 2 K/UL (ref 2–7.5)
NEUTROPHILS RELATIVE PERCENT: 42.3 %
PDW BLD-RTO: 15.7 % (ref 11–16)
PLATELET # BLD: 213 K/UL (ref 150–400)
PMV BLD AUTO: 9.6 FL (ref 6–10)
POTASSIUM SERPL-SCNC: 2.7 MMOL/L (ref 3.4–5.1)
RBC # BLD: 4.29 M/UL (ref 3.8–5.8)
SODIUM BLD-SCNC: 143 MMOL/L (ref 136–145)
VANCOMYCIN TROUGH: 10.6 UG/ML (ref 5–15)
WBC # BLD: 4.6 K/UL (ref 4–11)

## 2018-11-08 PROCEDURE — 2580000003 HC RX 258: Performed by: NURSE PRACTITIONER

## 2018-11-08 PROCEDURE — 94760 N-INVAS EAR/PLS OXIMETRY 1: CPT

## 2018-11-08 PROCEDURE — 2580000003 HC RX 258: Performed by: INTERNAL MEDICINE

## 2018-11-08 PROCEDURE — 99253 IP/OBS CNSLTJ NEW/EST LOW 45: CPT | Performed by: INTERNAL MEDICINE

## 2018-11-08 PROCEDURE — 97110 THERAPEUTIC EXERCISES: CPT

## 2018-11-08 PROCEDURE — 6360000002 HC RX W HCPCS: Performed by: PEDIATRICS

## 2018-11-08 PROCEDURE — 80202 ASSAY OF VANCOMYCIN: CPT

## 2018-11-08 PROCEDURE — 80048 BASIC METABOLIC PNL TOTAL CA: CPT

## 2018-11-08 PROCEDURE — 85025 COMPLETE CBC W/AUTO DIFF WBC: CPT

## 2018-11-08 PROCEDURE — 99231 SBSQ HOSP IP/OBS SF/LOW 25: CPT | Performed by: INTERNAL MEDICINE

## 2018-11-08 PROCEDURE — 6360000002 HC RX W HCPCS: Performed by: INTERNAL MEDICINE

## 2018-11-08 PROCEDURE — 36415 COLL VENOUS BLD VENIPUNCTURE: CPT

## 2018-11-08 PROCEDURE — 97530 THERAPEUTIC ACTIVITIES: CPT

## 2018-11-08 PROCEDURE — 6360000002 HC RX W HCPCS: Performed by: NURSE PRACTITIONER

## 2018-11-08 PROCEDURE — 6370000000 HC RX 637 (ALT 250 FOR IP): Performed by: INTERNAL MEDICINE

## 2018-11-08 PROCEDURE — 6370000000 HC RX 637 (ALT 250 FOR IP): Performed by: NURSE PRACTITIONER

## 2018-11-08 PROCEDURE — 2500000003 HC RX 250 WO HCPCS: Performed by: NURSE PRACTITIONER

## 2018-11-08 PROCEDURE — 1200000000 HC SEMI PRIVATE

## 2018-11-08 PROCEDURE — 6370000000 HC RX 637 (ALT 250 FOR IP): Performed by: PEDIATRICS

## 2018-11-08 PROCEDURE — 6360000002 HC RX W HCPCS

## 2018-11-08 PROCEDURE — 71046 X-RAY EXAM CHEST 2 VIEWS: CPT

## 2018-11-08 RX ORDER — POTASSIUM CHLORIDE 7.45 MG/ML
10 INJECTION INTRAVENOUS
Status: COMPLETED | OUTPATIENT
Start: 2018-11-08 | End: 2018-11-08

## 2018-11-08 RX ORDER — POTASSIUM CHLORIDE 20MEQ/15ML
20 LIQUID (ML) ORAL ONCE
Status: COMPLETED | OUTPATIENT
Start: 2018-11-08 | End: 2018-11-08

## 2018-11-08 RX ORDER — POTASSIUM CHLORIDE 7.45 MG/ML
INJECTION INTRAVENOUS
Status: COMPLETED
Start: 2018-11-08 | End: 2018-11-08

## 2018-11-08 RX ADMIN — TAZOBACTAM SODIUM AND PIPERACILLIN SODIUM 3.38 G: 375; 3 INJECTION, SOLUTION INTRAVENOUS at 16:54

## 2018-11-08 RX ADMIN — FAMOTIDINE 20 MG: 20 TABLET ORAL at 07:50

## 2018-11-08 RX ADMIN — TRIAMCINOLONE ACETONIDE: 1 CREAM TOPICAL at 20:57

## 2018-11-08 RX ADMIN — TRIAMCINOLONE ACETONIDE: 1 CREAM TOPICAL at 07:51

## 2018-11-08 RX ADMIN — MICONAZOLE NITRATE: 2 POWDER TOPICAL at 20:57

## 2018-11-08 RX ADMIN — POTASSIUM CHLORIDE 10 MEQ: 7.46 INJECTION, SOLUTION INTRAVENOUS at 07:54

## 2018-11-08 RX ADMIN — POTASSIUM CHLORIDE 10 MEQ: 7.46 INJECTION, SOLUTION INTRAVENOUS at 12:38

## 2018-11-08 RX ADMIN — CARVEDILOL 3.12 MG: 3.12 TABLET, FILM COATED ORAL at 07:52

## 2018-11-08 RX ADMIN — MICONAZOLE NITRATE: 2 POWDER TOPICAL at 07:51

## 2018-11-08 RX ADMIN — VANCOMYCIN HYDROCHLORIDE 1250 MG: 750 INJECTION, POWDER, LYOPHILIZED, FOR SOLUTION INTRAVENOUS at 15:00

## 2018-11-08 RX ADMIN — Medication 10 ML: at 07:49

## 2018-11-08 RX ADMIN — HYDROCODONE BITARTRATE AND ACETAMINOPHEN 1 TABLET: 7.5; 325 TABLET ORAL at 16:13

## 2018-11-08 RX ADMIN — POTASSIUM CHLORIDE 20 MEQ: 20 SOLUTION ORAL at 07:50

## 2018-11-08 RX ADMIN — TAZOBACTAM SODIUM AND PIPERACILLIN SODIUM 3.38 G: 375; 3 INJECTION, SOLUTION INTRAVENOUS at 11:34

## 2018-11-08 RX ADMIN — TAZOBACTAM SODIUM AND PIPERACILLIN SODIUM 3.38 G: 375; 3 INJECTION, SOLUTION INTRAVENOUS at 01:44

## 2018-11-08 RX ADMIN — BISACODYL 10 MG: 5 TABLET, COATED ORAL at 07:50

## 2018-11-08 RX ADMIN — CLOPIDOGREL BISULFATE 75 MG: 75 TABLET, FILM COATED ORAL at 07:52

## 2018-11-08 RX ADMIN — FAMOTIDINE 20 MG: 20 TABLET ORAL at 20:54

## 2018-11-08 RX ADMIN — FOLIC ACID 1 MG: 1 TABLET ORAL at 07:52

## 2018-11-08 RX ADMIN — ALPRAZOLAM 0.25 MG: 0.25 TABLET ORAL at 07:50

## 2018-11-08 RX ADMIN — APIXABAN 10 MG: 5 TABLET, FILM COATED ORAL at 20:54

## 2018-11-08 RX ADMIN — FUROSEMIDE 40 MG: 40 TABLET ORAL at 07:52

## 2018-11-08 RX ADMIN — HYDROCODONE BITARTRATE AND ACETAMINOPHEN 1 TABLET: 7.5; 325 TABLET ORAL at 09:21

## 2018-11-08 RX ADMIN — SILVER SULFADIAZINE: 10 CREAM TOPICAL at 07:51

## 2018-11-08 RX ADMIN — ASPIRIN 325 MG: 325 TABLET, DELAYED RELEASE ORAL at 07:52

## 2018-11-08 RX ADMIN — ENOXAPARIN SODIUM 90 MG: 100 INJECTION SUBCUTANEOUS at 07:51

## 2018-11-08 RX ADMIN — CARVEDILOL 3.12 MG: 3.12 TABLET, FILM COATED ORAL at 16:54

## 2018-11-08 RX ADMIN — POTASSIUM CHLORIDE 10 MEQ: 7.46 INJECTION, SOLUTION INTRAVENOUS at 09:53

## 2018-11-08 ASSESSMENT — PAIN SCALES - GENERAL
PAINLEVEL_OUTOF10: 8
PAINLEVEL_OUTOF10: 8

## 2018-11-08 NOTE — CONSULTS
11/6/18  Vancomycin 1250 mg IV q24h with trough prior to third dose for cellulitis. Wt. 90.6 kg. SrCr 0.6. Est CrCl = 66 ml/min. Pharmacy will monitor and adjust.  Thanks, Jeremiah Ellington, Pharm. D.
She is found to have edema on erythema left lower extremity has been treated for suspected cellulitis. Ultrasound of that extremity revealed evidence for chronic femoral vein DVT. She reports that her most recent coronary intervention was approximate 4 years ago. She denies dyspnea or chest discomfort. Appears to have a very poor functional capacity after her recent stroke leaving her with left-sided weakness. She does use a will walker for ambulation. Initiation of anticoagulation was recommended during her evaluation in July of this year however she was discharged on dual antiplatelet therapy at that time. OBJECTIVE:  Vitals reviewed in chart  I/O last 3 completed shifts: In: 536 [P.O.:486; IV Piggyback:50]  Out: 1250 [Urine:1150; Stool:100]  I/O this shift: In: 48 [P.O.:50]  Out: -       PHYSICAL EXAMINATION:    General appearance: Alert, cooperative, NAD, appears stated age  Head:   Normocephalic, atraumatic  Eyes:   Conjunctiva and cornea clear, EOM intact bilaterally  Ears:    External pinna normal  Nose:   Normal nares, septum midline, no drainage  Throat:   Lips normal, mucosa and tongue normal  Neck:      Supple, no LEONA, trachea midline, no thyromegaly  Lungs:   Clear throughout bilaterally, normal effort, normal to palpation. Chest wall:  Non tender, no deformity  CV:    Regular rate and rhythm, 2/6 holosystolic murmur left lower sternal border, rubs, or gallups, normal    PMI, normal s1 and s1, no s3 or s4.   Abdomen:  Soft, nontender, positive bowel sounds in all quandrants, no    Hepatosplenomegaly  Extremities:  Normal, atraumatic, no cyanosis, 1+ left lower extremity pretibial edema with associated mild erythema  Pulses:  2+ symmetric in all extremities  Skin:   Color, texture, and turgor normal.  No rashes or lesions  Lymph nodes  Cervical, supraclavicular, and axillary nodes normal  Neurologic:  Normal strength, sensation intact throughout      PROBLEM LIST:  Principal Problem:    Cellulitis of

## 2018-11-09 VITALS
OXYGEN SATURATION: 93 % | RESPIRATION RATE: 16 BRPM | WEIGHT: 208.9 LBS | DIASTOLIC BLOOD PRESSURE: 87 MMHG | HEIGHT: 65 IN | HEART RATE: 102 BPM | TEMPERATURE: 98.1 F | BODY MASS INDEX: 34.81 KG/M2 | SYSTOLIC BLOOD PRESSURE: 183 MMHG

## 2018-11-09 LAB
ANION GAP SERPL CALCULATED.3IONS-SCNC: 14 MMOL/L (ref 3–16)
BUN BLDV-MCNC: 6 MG/DL (ref 6–20)
CALCIUM SERPL-MCNC: 9.3 MG/DL (ref 8.5–10.5)
CHLORIDE BLD-SCNC: 99 MMOL/L (ref 98–107)
CO2: 28 MMOL/L (ref 20–30)
CREAT SERPL-MCNC: 0.8 MG/DL (ref 0.4–1.2)
GFR AFRICAN AMERICAN: >59
GFR NON-AFRICAN AMERICAN: >60
GLUCOSE BLD-MCNC: 94 MG/DL (ref 74–106)
HCT VFR BLD CALC: 37.5 % (ref 37–47)
HEMOGLOBIN: 11.5 G/DL (ref 11.5–16.5)
MAGNESIUM: 1.7 MG/DL (ref 1.7–2.4)
MCH RBC QN AUTO: 26.1 PG (ref 27–32)
MCHC RBC AUTO-ENTMCNC: 30.7 G/DL (ref 31–35)
MCV RBC AUTO: 85 FL (ref 80–100)
PDW BLD-RTO: 15.7 % (ref 11–16)
PLATELET # BLD: 217 K/UL (ref 150–400)
PMV BLD AUTO: 9.7 FL (ref 6–10)
POTASSIUM SERPL-SCNC: 2.9 MMOL/L (ref 3.4–5.1)
RBC # BLD: 4.41 M/UL (ref 3.8–5.8)
SODIUM BLD-SCNC: 141 MMOL/L (ref 136–145)
WBC # BLD: 4.5 K/UL (ref 4–11)

## 2018-11-09 PROCEDURE — 6370000000 HC RX 637 (ALT 250 FOR IP): Performed by: NURSE PRACTITIONER

## 2018-11-09 PROCEDURE — 6370000000 HC RX 637 (ALT 250 FOR IP): Performed by: INTERNAL MEDICINE

## 2018-11-09 PROCEDURE — 83735 ASSAY OF MAGNESIUM: CPT

## 2018-11-09 PROCEDURE — 6360000002 HC RX W HCPCS: Performed by: INTERNAL MEDICINE

## 2018-11-09 PROCEDURE — 2580000003 HC RX 258: Performed by: NURSE PRACTITIONER

## 2018-11-09 PROCEDURE — 36415 COLL VENOUS BLD VENIPUNCTURE: CPT

## 2018-11-09 PROCEDURE — 80048 BASIC METABOLIC PNL TOTAL CA: CPT

## 2018-11-09 PROCEDURE — 2500000003 HC RX 250 WO HCPCS: Performed by: NURSE PRACTITIONER

## 2018-11-09 PROCEDURE — 85027 COMPLETE CBC AUTOMATED: CPT

## 2018-11-09 PROCEDURE — 99238 HOSP IP/OBS DSCHRG MGMT 30/<: CPT | Performed by: INTERNAL MEDICINE

## 2018-11-09 PROCEDURE — 6360000002 HC RX W HCPCS: Performed by: NURSE PRACTITIONER

## 2018-11-09 PROCEDURE — 2580000003 HC RX 258: Performed by: INTERNAL MEDICINE

## 2018-11-09 RX ORDER — HYDROCODONE BITARTRATE AND ACETAMINOPHEN 7.5; 325 MG/1; MG/1
1 TABLET ORAL 2 TIMES DAILY PRN
Qty: 14 TABLET | Refills: 0 | Status: SHIPPED | OUTPATIENT
Start: 2018-11-09 | End: 2018-11-16

## 2018-11-09 RX ORDER — CARVEDILOL 3.12 MG/1
3.12 TABLET ORAL 2 TIMES DAILY WITH MEALS
Qty: 60 TABLET | Refills: 3 | Status: SHIPPED | OUTPATIENT
Start: 2018-11-09

## 2018-11-09 RX ORDER — HYDROCODONE BITARTRATE AND ACETAMINOPHEN 7.5; 325 MG/1; MG/1
1 TABLET ORAL 2 TIMES DAILY PRN
Qty: 14 TABLET | Refills: 0
Start: 2018-11-09 | End: 2018-11-09

## 2018-11-09 RX ORDER — FAMOTIDINE 20 MG/1
20 TABLET, FILM COATED ORAL 2 TIMES DAILY
Qty: 60 TABLET | Refills: 0 | Status: SHIPPED | OUTPATIENT
Start: 2018-11-09

## 2018-11-09 RX ORDER — TRIAMCINOLONE ACETONIDE 1 MG/G
CREAM TOPICAL
Qty: 45 G | Refills: 0 | Status: SHIPPED | OUTPATIENT
Start: 2018-11-09

## 2018-11-09 RX ORDER — FOLIC ACID 1 MG/1
1 TABLET ORAL DAILY
Qty: 30 TABLET | Refills: 3 | Status: SHIPPED | OUTPATIENT
Start: 2018-11-10

## 2018-11-09 RX ORDER — ALPRAZOLAM 0.25 MG/1
0.25 TABLET ORAL NIGHTLY PRN
Qty: 7 TABLET | Refills: 0 | Status: SHIPPED | OUTPATIENT
Start: 2018-11-09 | End: 2018-11-16

## 2018-11-09 RX ORDER — POTASSIUM CHLORIDE 20 MEQ/1
20 TABLET, EXTENDED RELEASE ORAL DAILY
Qty: 60 TABLET | Refills: 0 | Status: CANCELLED | OUTPATIENT
Start: 2018-11-09

## 2018-11-09 RX ORDER — POTASSIUM CHLORIDE 20 MEQ/1
20 TABLET, EXTENDED RELEASE ORAL DAILY
Qty: 30 TABLET | Refills: 0 | Status: SHIPPED | OUTPATIENT
Start: 2018-11-09

## 2018-11-09 RX ORDER — POTASSIUM CHLORIDE 20 MEQ/1
40 TABLET, EXTENDED RELEASE ORAL EVERY 4 HOURS
Status: COMPLETED | OUTPATIENT
Start: 2018-11-09 | End: 2018-11-09

## 2018-11-09 RX ORDER — ALPRAZOLAM 0.25 MG/1
0.25 TABLET ORAL NIGHTLY PRN
Qty: 14 TABLET | Refills: 0
Start: 2018-11-09 | End: 2018-11-09

## 2018-11-09 RX ORDER — FUROSEMIDE 40 MG/1
40 TABLET ORAL DAILY
Qty: 30 TABLET | Refills: 0 | Status: SHIPPED | OUTPATIENT
Start: 2018-11-10

## 2018-11-09 RX ORDER — HYDROCODONE BITARTRATE AND ACETAMINOPHEN 7.5; 325 MG/1; MG/1
1 TABLET ORAL ONCE
Status: COMPLETED | OUTPATIENT
Start: 2018-11-09 | End: 2018-11-09

## 2018-11-09 RX ADMIN — APIXABAN 10 MG: 5 TABLET, FILM COATED ORAL at 08:15

## 2018-11-09 RX ADMIN — TRIAMCINOLONE ACETONIDE: 1 CREAM TOPICAL at 10:34

## 2018-11-09 RX ADMIN — BISACODYL 10 MG: 5 TABLET, COATED ORAL at 08:16

## 2018-11-09 RX ADMIN — TAZOBACTAM SODIUM AND PIPERACILLIN SODIUM 3.38 G: 375; 3 INJECTION, SOLUTION INTRAVENOUS at 08:14

## 2018-11-09 RX ADMIN — TAZOBACTAM SODIUM AND PIPERACILLIN SODIUM 3.38 G: 375; 3 INJECTION, SOLUTION INTRAVENOUS at 01:02

## 2018-11-09 RX ADMIN — CARVEDILOL 3.12 MG: 3.12 TABLET, FILM COATED ORAL at 08:15

## 2018-11-09 RX ADMIN — Medication 10 ML: at 08:23

## 2018-11-09 RX ADMIN — FAMOTIDINE 20 MG: 20 TABLET ORAL at 08:15

## 2018-11-09 RX ADMIN — POTASSIUM CHLORIDE 40 MEQ: 20 TABLET, EXTENDED RELEASE ORAL at 08:15

## 2018-11-09 RX ADMIN — FUROSEMIDE 40 MG: 40 TABLET ORAL at 08:15

## 2018-11-09 RX ADMIN — HYDROCODONE BITARTRATE AND ACETAMINOPHEN 1 TABLET: 7.5; 325 TABLET ORAL at 08:16

## 2018-11-09 RX ADMIN — SILVER SULFADIAZINE: 10 CREAM TOPICAL at 10:32

## 2018-11-09 RX ADMIN — FOLIC ACID 1 MG: 1 TABLET ORAL at 08:16

## 2018-11-09 RX ADMIN — VANCOMYCIN HYDROCHLORIDE 1250 MG: 750 INJECTION, POWDER, LYOPHILIZED, FOR SOLUTION INTRAVENOUS at 14:03

## 2018-11-09 RX ADMIN — HYDROCODONE BITARTRATE AND ACETAMINOPHEN 1 TABLET: 7.5; 325 TABLET ORAL at 14:12

## 2018-11-09 RX ADMIN — MICONAZOLE NITRATE: 2 POWDER TOPICAL at 08:16

## 2018-11-09 RX ADMIN — POTASSIUM CHLORIDE 40 MEQ: 20 TABLET, EXTENDED RELEASE ORAL at 11:57

## 2018-11-09 ASSESSMENT — PAIN SCALES - GENERAL
PAINLEVEL_OUTOF10: 9
PAINLEVEL_OUTOF10: 0
PAINLEVEL_OUTOF10: 8

## 2018-11-09 NOTE — PROGRESS NOTES
I did see and examine the patient with ARABELLA Lang. The case was discussed with him. Please refer to his note for details. Patient was admitted with tailbone pain. Found to have swelling the lower extremities. Also cellulitis to the left leg. 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 Try to diuresis. Treat with anticoagulation. Physical and occupational therapy. Monitor fluid balance especially with her pleural effusion. Some confusion this morning. K72, folic acid and TSH within normal limits. Replace her potassium.  Further recommendations/details per Hussein's note
K LEVEL-2.8. CALLED DR. SANCHEZ AND REPORTED LAB VALUE, NEW ORDER GIVEN.  SEE PHYSCIAN ORDERS
Pt bed alarm was put on by other staff, pt was not wearing brief, aides put brief on
REPORT CALLED TO YISEL/KOLBY @ [place] [facility].
Minimal assistance  Transfers  Stand Pivot Transfers: Minimal assistance (x2)  Sit to stand: Minimal assistance         Assessment   Assessment: Pt presents with confusion on this date. pt required min assist to come to sitting position. Pt come to stand with min assist. pt ambulated 18 ft with RW with CGA and chair follow for safety. Pt toileted with min assist on this date with min assist for toileting transfer. Pt required min assist to return to supine position in bed. Pt left with call light in reach. Co tx with PT. Plan   Plan  Times per week: 3-5  Times per day: Daily  Plan weeks: 1  Current Treatment Recommendations: Strengthening, Endurance Training, Balance Training, Functional Mobility Training, Self-Care / ADL, Neuromuscular Re-education, Safety Education & Training       Goals  Short term goals  Time Frame for Short term goals: 1 week  Short term goal 1: Pt to complete toileting with min assist.   Short term goal 2: Pt to complete UB dressing with SBA and LB dressing with mod assist usign AE as needed. Dedra Loge term goal 3: Pt to improve bed mobility to moderate assist.   Short term goal 4: Pt to tolerate x15 minutes of activity to improve functional activity tolerance. Short term goal 5: Pt to complete UB bathing with LOGAN. Therapy Time   Individual Concurrent Group Co-treatment   Time In 0301         Time Out 0329         Minutes 28              This note serves as a DC summary in the event of pt discharge.      KIMBER Urias/L
Training, Functional Mobility Training, Self-Care / ADL, Neuromuscular Re-education, Safety Education & Training    G-Code  OT G-codes  Functional Limitation: Self care  Self Care Current Status (): 100 percent impaired, limited or restricted  Self Care Goal Status (): At least 60 percent but less than 80 percent impaired, limited or restricted       Goals  Short term goals  Time Frame for Short term goals: 1 week  Short term goal 1: Pt to complete toileting with min assist.   Short term goal 2: Pt to complete UB dressing with SBA and LB dressing with mod assist usign AE as needed. Meño Cuellars term goal 3: Pt to improve bed mobility to moderate assist.   Short term goal 4: Pt to tolerate x15 minutes of activity to improve functional activity tolerance. Short term goal 5: Pt to complete UB bathing with LOGAN. Therapy Time   Individual Concurrent Group Co-treatment   Time In 1119         Time Out 5922         Minutes 26              This note serves as a DC summary in the event of pt discharge.      Brunilda Arnold, OTR/L

## 2018-11-09 NOTE — DISCHARGE SUMMARY
PCP for CXR follow up. Go to ED if worsening S&S.  11/06/2018    Back pain [M54.9]  11/5 CT lumbar spine shows Severe lumbar spondylosis with no acute fractures. Worked with PT/OT. Some pain improvement. Would benefit from continued PT/OT. Will d/c on pain medication PRN then defer to PCP. 11/06/2018    Essential hypertension [I10]  BP has been intermittently elevated at times. Control pain. Stable at this time. 11/06/2018    Cellulitis of lower leg [L03.119]  Refer to DVT plans. 11/05/2018   · Hypokalemia  Potassium replaced IV; oral given. D/C on oral potassium. CBC/BMP in 1 week. Defer to PCP. · Insomnia/anxiety  D/C on xanax PRN then Defer to PCP for follow up. -GI prophylaxis, continue pepcid at d/c. Disposition: long term care facility    Discharged Condition: Stable    Vitals:   Temp: 97.4 °F (36.3 °C)  Pulse: 97  Resp: 20  BP: (!) 177/84  SpO2: 96 %  O2 Device: None (Room air)       Vital signs reviewed in electronic chart    Physical exam  stitutional:  Obese. Well developed, well nourished, no acute distress. Eyes:  PERRL, conjunctiva normal, EOMI. HENT:  Atraumatic, external ears normal, external nose/nares normal, oropharynx moist, no pharyngeal exudates. Neck:  Supple. No JVD or thyromegaly. Respiratory:  No respiratory distress, normal breath sounds, no rales, no wheezing. Cardiovascular:  1+ LLE edema. Normal rate, normal rhythm, no murmurs, no rubs. GI:  Soft, nondistended, normal bowel sounds, nontender, no mass. Colostomy present LLQ. Musculoskeletal:  1+ LLE edema, no tenderness, no obvious deformities. Patient is moving all extremities. Integument:  Well hydrated, no rash. Redness LLE improving. Erythema abdominal skin folds. Quarter size open area on left upper sacral area with no drainage. Neurologic:  Alert & oriented x 3,  no focal deficits noted. BLE 3/5 muscle strength. Strength is weak and equal throughout.   Psychiatric:  Speech and behavior

## 2018-11-11 LAB
BLOOD CULTURE, ROUTINE: NORMAL
CULTURE, BLOOD 2: NORMAL

## 2019-10-28 ENCOUNTER — LAB REQUISITION (OUTPATIENT)
Dept: LAB | Facility: HOSPITAL | Age: 84
End: 2019-10-28

## 2019-10-28 DIAGNOSIS — Z00.00 ROUTINE GENERAL MEDICAL EXAMINATION AT A HEALTH CARE FACILITY: ICD-10-CM

## 2019-10-28 LAB
ALBUMIN SERPL-MCNC: 3.3 G/DL (ref 3.5–5.2)
ALBUMIN/GLOB SERPL: 1.3 G/DL
ALP SERPL-CCNC: 60 U/L (ref 39–117)
ALT SERPL W P-5'-P-CCNC: <5 U/L (ref 1–33)
ANION GAP SERPL CALCULATED.3IONS-SCNC: 9.2 MMOL/L (ref 5–15)
AST SERPL-CCNC: 8 U/L (ref 1–32)
BASOPHILS # BLD AUTO: 0.02 10*3/MM3 (ref 0–0.2)
BASOPHILS NFR BLD AUTO: 0.3 % (ref 0–1.5)
BILIRUB SERPL-MCNC: 0.3 MG/DL (ref 0.2–1.2)
BUN BLD-MCNC: 23 MG/DL (ref 8–23)
BUN/CREAT SERPL: 16 (ref 7–25)
CALCIUM SPEC-SCNC: 8.8 MG/DL (ref 8.2–9.6)
CHLORIDE SERPL-SCNC: 101 MMOL/L (ref 98–107)
CO2 SERPL-SCNC: 33.8 MMOL/L (ref 22–29)
CREAT BLD-MCNC: 1.44 MG/DL (ref 0.57–1)
DEPRECATED RDW RBC AUTO: 42 FL (ref 37–54)
EOSINOPHIL # BLD AUTO: 0.16 10*3/MM3 (ref 0–0.4)
EOSINOPHIL NFR BLD AUTO: 2.7 % (ref 0.3–6.2)
ERYTHROCYTE [DISTWIDTH] IN BLOOD BY AUTOMATED COUNT: 12.8 % (ref 12.3–15.4)
GFR SERPL CREATININE-BSD FRML MDRD: 34 ML/MIN/1.73
GFR SERPL CREATININE-BSD FRML MDRD: 41 ML/MIN/1.73
GLOBULIN UR ELPH-MCNC: 2.5 GM/DL
GLUCOSE BLD-MCNC: 128 MG/DL (ref 65–99)
HCT VFR BLD AUTO: 29.9 % (ref 34–46.6)
HGB BLD-MCNC: 9.7 G/DL (ref 12–15.9)
IMM GRANULOCYTES # BLD AUTO: 0.02 10*3/MM3 (ref 0–0.05)
IMM GRANULOCYTES NFR BLD AUTO: 0.3 % (ref 0–0.5)
LYMPHOCYTES # BLD AUTO: 2.09 10*3/MM3 (ref 0.7–3.1)
LYMPHOCYTES NFR BLD AUTO: 35.9 % (ref 19.6–45.3)
MCH RBC QN AUTO: 29.1 PG (ref 26.6–33)
MCHC RBC AUTO-ENTMCNC: 32.4 G/DL (ref 31.5–35.7)
MCV RBC AUTO: 89.8 FL (ref 79–97)
MONOCYTES # BLD AUTO: 0.4 10*3/MM3 (ref 0.1–0.9)
MONOCYTES NFR BLD AUTO: 6.9 % (ref 5–12)
NEUTROPHILS # BLD AUTO: 3.13 10*3/MM3 (ref 1.7–7)
NEUTROPHILS NFR BLD AUTO: 53.9 % (ref 42.7–76)
NRBC BLD AUTO-RTO: 0 /100 WBC (ref 0–0.2)
PLATELET # BLD AUTO: 156 10*3/MM3 (ref 140–450)
PMV BLD AUTO: 9.9 FL (ref 6–12)
POTASSIUM BLD-SCNC: 4.3 MMOL/L (ref 3.5–5.2)
PROT SERPL-MCNC: 5.8 G/DL (ref 6–8.5)
RBC # BLD AUTO: 3.33 10*6/MM3 (ref 3.77–5.28)
SODIUM BLD-SCNC: 144 MMOL/L (ref 136–145)
WBC NRBC COR # BLD: 5.82 10*3/MM3 (ref 3.4–10.8)

## 2019-10-28 PROCEDURE — 80053 COMPREHEN METABOLIC PANEL: CPT

## 2019-10-28 PROCEDURE — 85025 COMPLETE CBC W/AUTO DIFF WBC: CPT

## 2019-10-29 ENCOUNTER — LAB REQUISITION (OUTPATIENT)
Dept: LAB | Facility: HOSPITAL | Age: 84
End: 2019-10-29

## 2019-10-29 DIAGNOSIS — Z00.00 ROUTINE GENERAL MEDICAL EXAMINATION AT A HEALTH CARE FACILITY: ICD-10-CM

## 2019-10-29 DIAGNOSIS — R41.82 ALTERED MENTAL STATUS, UNSPECIFIED: ICD-10-CM

## 2019-10-29 LAB
BILIRUB UR QL STRIP: NEGATIVE
CLARITY UR: ABNORMAL
COLOR UR: YELLOW
GLUCOSE UR STRIP-MCNC: NEGATIVE MG/DL
HGB UR QL STRIP.AUTO: NEGATIVE
KETONES UR QL STRIP: NEGATIVE
LEUKOCYTE ESTERASE UR QL STRIP.AUTO: ABNORMAL
NITRITE UR QL STRIP: NEGATIVE
PH UR STRIP.AUTO: 7 [PH] (ref 5–8)
PROT UR QL STRIP: NEGATIVE
SP GR UR STRIP: 1.01 (ref 1–1.03)
UROBILINOGEN UR QL STRIP: ABNORMAL

## 2019-10-29 PROCEDURE — 81015 MICROSCOPIC EXAM OF URINE: CPT | Performed by: INTERNAL MEDICINE

## 2019-10-29 PROCEDURE — 81003 URINALYSIS AUTO W/O SCOPE: CPT | Performed by: INTERNAL MEDICINE

## 2019-10-30 LAB
AMORPH URATE CRY URNS QL MICRO: ABNORMAL /HPF
BACTERIA UR QL AUTO: ABNORMAL /HPF
HYALINE CASTS UR QL AUTO: ABNORMAL /LPF
RBC # UR: ABNORMAL /HPF
REF LAB TEST METHOD: ABNORMAL
SQUAMOUS #/AREA URNS HPF: ABNORMAL /HPF
WBC UR QL AUTO: ABNORMAL /HPF

## 2020-01-01 ENCOUNTER — LAB REQUISITION (OUTPATIENT)
Dept: LAB | Facility: HOSPITAL | Age: 85
End: 2020-01-01

## 2020-01-01 DIAGNOSIS — Z79.2 LONG TERM (CURRENT) USE OF ANTIBIOTICS: ICD-10-CM

## 2020-01-01 DIAGNOSIS — Z00.00 ROUTINE GENERAL MEDICAL EXAMINATION AT A HEALTH CARE FACILITY: ICD-10-CM

## 2020-01-01 DIAGNOSIS — R50.9 FEVER, UNSPECIFIED: ICD-10-CM

## 2020-01-01 LAB
BACTERIA UR QL AUTO: ABNORMAL /HPF
BACTERIA UR QL AUTO: ABNORMAL /HPF
BILIRUB UR QL STRIP: ABNORMAL
BILIRUB UR QL STRIP: NEGATIVE
CLARITY UR: ABNORMAL
CLARITY UR: ABNORMAL
COLOR UR: ABNORMAL
COLOR UR: YELLOW
GLUCOSE UR STRIP-MCNC: NEGATIVE MG/DL
GLUCOSE UR STRIP-MCNC: NEGATIVE MG/DL
HGB UR QL STRIP.AUTO: ABNORMAL
HGB UR QL STRIP.AUTO: ABNORMAL
HYALINE CASTS UR QL AUTO: ABNORMAL /LPF
HYALINE CASTS UR QL AUTO: ABNORMAL /LPF
KETONES UR QL STRIP: NEGATIVE
KETONES UR QL STRIP: NEGATIVE
LEUKOCYTE ESTERASE UR QL STRIP.AUTO: ABNORMAL
LEUKOCYTE ESTERASE UR QL STRIP.AUTO: ABNORMAL
NITRITE UR QL STRIP: POSITIVE
NITRITE UR QL STRIP: POSITIVE
PH UR STRIP.AUTO: 6 [PH] (ref 5–8)
PH UR STRIP.AUTO: 8 [PH] (ref 5–8)
PROT UR QL STRIP: ABNORMAL
PROT UR QL STRIP: NEGATIVE
RBC # UR: ABNORMAL /HPF
RBC # UR: ABNORMAL /HPF
REF LAB TEST METHOD: ABNORMAL
REF LAB TEST METHOD: ABNORMAL
SP GR UR STRIP: 1.01 (ref 1–1.03)
SP GR UR STRIP: 1.02 (ref 1–1.03)
SQUAMOUS #/AREA URNS HPF: ABNORMAL /HPF
SQUAMOUS #/AREA URNS HPF: ABNORMAL /HPF
UROBILINOGEN UR QL STRIP: ABNORMAL
UROBILINOGEN UR QL STRIP: ABNORMAL
VANCOMYCIN TROUGH SERPL-MCNC: 11.7 MCG/ML (ref 5–20)
VANCOMYCIN TROUGH SERPL-MCNC: 33.2 MCG/ML (ref 5–20)
WBC UR QL AUTO: ABNORMAL /HPF
WBC UR QL AUTO: ABNORMAL /HPF

## 2020-01-01 PROCEDURE — 81001 URINALYSIS AUTO W/SCOPE: CPT

## 2020-01-01 PROCEDURE — 80202 ASSAY OF VANCOMYCIN: CPT

## 2020-12-04 ENCOUNTER — HOSPITAL ENCOUNTER (EMERGENCY)
Facility: HOSPITAL | Age: 85
Discharge: NURSING, REHAB OR LONG-TERM CARE | End: 2020-12-04
Attending: HOSPITALIST
Payer: MEDICARE

## 2020-12-04 VITALS
RESPIRATION RATE: 18 BRPM | HEART RATE: 99 BPM | HEIGHT: 64 IN | SYSTOLIC BLOOD PRESSURE: 117 MMHG | DIASTOLIC BLOOD PRESSURE: 54 MMHG | BODY MASS INDEX: 34.15 KG/M2 | WEIGHT: 200 LBS | TEMPERATURE: 99.7 F | OXYGEN SATURATION: 100 %

## 2020-12-04 PROCEDURE — 99284 EMERGENCY DEPT VISIT MOD MDM: CPT

## 2020-12-04 NOTE — ED NOTES
[facility] here at this time to transport patient, report given to Shreya Erickson from [address] [place] [address] at this time.      Kimberly Brown RN  12/04/20 5112

## 2020-12-04 NOTE — ED NOTES
Dr Sherwin Salinas at bedside at this time, he reassessed patients left lower quadrant colostomy stoma, he was able to insert his lubricated thumb into stoma without difficulty. Patient again tolerated without problems at this time.      Gian Forte RN  12/04/20 8831

## 2020-12-04 NOTE — ED NOTES
Dr Greta Willis at bedside to assess patients stoma, stoma soft and patent, Dr. Greta Willis able to insert lubricated finger in stoma without any problems, patient with no pain or complaints upon examination.      Hong Landa RN  12/04/20 4417

## 2020-12-04 NOTE — ED PROVIDER NOTES
[address] ENCOUNTER      Pt Name: Jerri Worrell  MRN: [id number]  YOB: 1926  Date of evaluation: 12/4/2020  Provider: Roland Gaston DO    CHIEF COMPLAINT       Chief Complaint   Patient presents with    GI Problem     stoma hole closing off per [facility]         HISTORY OF PRESENT ILLNESS  (Location/Symptom, Timing/Onset, Context/Setting, Quality, Duration, Modifying Factors, Severity.)   Jerri Worrell is a 80 y.o. female who presents to the emergency department for evaluation for a \"disappearing colostomy stoma\". Nursing home states that apparently this was a normal yesterday but they state they cannot find it today. Patient was transported here via EMS for evaluation for this. Upon arrival here the patient does have some baseline dementia she does answer questions appropriately when asked but she does have some decreased auditory hearing. Patient was talking about having to get smallpox vaccination when she was in school. She denies any pain. She states only pain that she was having because of her being \"jostled around in the ambulance\". Patient denies any fevers or chills. Denies any nausea vomiting or diarrhea. Denies any abdominal pain. Denies any headache out of ordinary. Patient essentially has really no complaints upon arrival here. Nursing notes were reviewed.     REVIEW OFSYSTEMS    (2-9 systems for level 4, 10 or more for level 5)   ROS:  General:  No fevers, no chills, no weakness  Cardiovascular:  No chest pain, no palpitations  Respiratory:  No shortness of breath, no cough, no wheezing  Gastrointestinal:  No pain, no nausea, no vomiting, no diarrhea  Musculoskeletal:  No muscle pain, no joint pain  Skin:  No rash, no easy bruising  Neurologic:  No speech problems, no headache, no extremity weakness  Psychiatric:  No anxiety  Genitourinary:  No dysuria, no hematuria    Except as noted above the remainder of the review of systems was reviewed and negative. PAST MEDICAL HISTORY     Past Medical History:   Diagnosis Date    Arthritis     [facility]     Cancer ([facility]     Colon Cancer    Cerebral artery occlusion with cerebral infarction ([facility]     Colostomy status ([facility]     Diverticulitis     Dysphagia     Hyperlipidemia     Hypertension     Hypokalemia     Kidney stone     MI, old     Obesity          SURGICAL HISTORY       Past Surgical History:   Procedure Laterality Date    COLON SURGERY      COLOSTOMY      CORONARY ANGIOPLASTY WITH STENT PLACEMENT      HERNIA REPAIR      HYSTERECTOMY      HYSTERECTOMY      KIDNEY STONE SURGERY      SKIN CANCER EXCISION           CURRENT MEDICATIONS       Previous Medications    ACETAMINOPHEN (TYLENOL) 325 MG TABLET    Take 650 mg by mouth every 6 hours as needed for Pain or Fever     BISACODYL (DULCOLAX) 5 MG EC TABLET    Take 2 tablets by mouth daily as needed for Constipation    CARVEDILOL (COREG) 3.125 MG TABLET    Take 1 tablet by mouth 2 times daily (with meals)    FAMOTIDINE (PEPCID) 20 MG TABLET    Take 1 tablet by mouth 2 times daily    FOLIC ACID (FOLVITE) 1 MG TABLET    Take 1 tablet by mouth daily    FUROSEMIDE (LASIX) 40 MG TABLET    Take 1 tablet by mouth daily    MICONAZOLE (MICOTIN) 2 % POWDER    Apply topically 2 times daily. NITROGLYCERIN (NITROSTAT) 0.4 MG SL TABLET    Place 0.4 mg under the tongue every 5 minutes as needed for Chest pain up to max of 3 total doses. If no relief after 1 dose, call 911. ONDANSETRON (ZOFRAN) 4 MG TABLET    Take 4 mg by mouth every 6 hours as needed for Nausea or Vomiting     POTASSIUM CHLORIDE (KLOR-CON M) 20 MEQ EXTENDED RELEASE TABLET    Take 1 tablet by mouth daily    SKIN PROTECTANTS, MISC. (DIMETHICONE-ZINC OXIDE) CREAM    Apply topically daily as needed for Dry Skin Apply with each incontinence episode as needed    TRIAMCINOLONE (KENALOG) 0.1 % CREAM    Apply topically 2 times daily. ALLERGIES     Patient has no known allergies. FAMILY HISTORY     No family history on file. SOCIAL HISTORY       Social History     Socioeconomic History    Marital status: Unknown     Spouse name: Not on file    Number of children: Not on file    Years of education: Not on file    Highest education level: Not on file   Occupational History    Not on file   Social Needs    Financial resource strain: Not on file    Food insecurity     Worry: Not on file     Inability: Not on file    Transportation needs     Medical: Not on file     Non-medical: Not on file   Tobacco Use    Smoking status: Never Smoker    Smokeless tobacco: Never Used   Substance and Sexual Activity    Alcohol use: No    Drug use: No    Sexual activity: Not on file   Lifestyle    Physical activity     Days per week: Not on file     Minutes per session: Not on file    Stress: Not on file   Relationships    Social connections     Talks on phone: Not on file     Gets together: Not on file     Attends Rastafari service: Not on file     Active member of club or organization: Not on file     Attends meetings of clubs or organizations: Not on file     Relationship status: Not on file    Intimate partner violence     Fear of current or ex partner: Not on file     Emotionally abused: Not on file     Physically abused: Not on file     Forced sexual activity: Not on file   Other Topics Concern    Not on file   Social History Narrative    Not on file         PHYSICAL EXAM    (up to 7 for level 4, 8 or more for level 5)     ED Triage Vitals   BP Temp Temp src Pulse Resp SpO2 Height Weight   -- -- -- -- -- -- -- --       Physical Exam  General :Patient is awake, alert, oriented, in no acute distress, nontoxic appearing  HEENT: Pupils are equally round and reactive to light, EOMI, conjunctivae clear. Oral mucosa is moist, no exudate.  Uvula is midline  Cardiac: Heart regular rate, rhythm, no murmurs, rubs, or gallops  Lungs: Lungs 99   Resp: 18   Temp: 99.7 °F (37.6 °C)   TempSrc: Oral   SpO2: 100%   Weight: 200 lb (90.7 kg)   Height: 5' 4\" (1.626 m)       MEDICATIONS ADMINISTERED IN ED:  Medications - No data to display    Initial evaluation and examination I did have a conversation with the patient about the upcoming plan, treatment possible disposition which I am sure she did not retain because the patient has severe auditory deficit and a baseline dementia. She was talking about getting vaccination for the smallpox when she was going to school. But otherwise patient stoma is patent and open. There is some mild edema and swelling noted to the area but the stoma itself is not stenosed or closed. There was gas heard with removal of my fingers from the stoma area. Otherwise patient will be discharged back to the nursing facility in stable condition. The patient advised she does need to follow-up with her regular family physician within the next 1 to 2 days reevaluation. Patient was also given instructions if symptoms worsens or new symptoms arise he should return back to emergency department for further evaluation work-up. CONSULTS:  None    PROCEDURES:  Procedures    CRITICAL CARE TIME    Total Critical Care time was 0 minutes, excluding separately reportable procedures. There was a high probability of clinically significant/life threatening deterioration in the patient's condition which required my urgent intervention. FINAL IMPRESSION      1. Colostomy complication [facility])          DISPOSITION/PLAN   DISPOSITION        PATIENT REFERRED TO:  MD Jeff Lucia [address] [place] [address] [phone]    In 2 days      MD Jeff Lucia [address] [place] [address] [phone]          [facility] Emergency Department  [facility] [address]   [facility]  [phone]    As needed, If symptoms worsen      DISCHARGE MEDICATIONS:  New Prescriptions    No medications on file       Comment: Please note this report has been produced using speech recognition software and may contain errorsrelated to that system including errors in grammar, punctuation, and spelling, as well as words and phrases that may be inappropriate. If there are any questions or concerns please feel free to contact the dictating providerfor clarification.     Latasha Bradley,   Attending Emergency Physician              Tyler Bernal, DO  12/04/20 1733       Tyler Curiel, DO  12/04/20 1738

## 2020-12-04 NOTE — ED NOTES
Altagracia Jimenez, RN at [facility] home called report on pt. C/o that last night her colostomy was about the size of a quarter and today it is 'pinhole' sized. States there was a BM charted last night. Positive BS. No c/o abd pain. Belly slightly firmer on ostomy side. Pt is alert with confusion and this is baseline neuro status. Pt is DNR. Also has a left buttock surgical site that is packed wet to dry with Dakins should the dressing come off. Was given a [place] at 1300. VSS /76, HR 70, RR 18, 95% SpO2 on 2LNC, temp 97.9.      Teresita Caldwell RN  12/04/20 6424

## 2020-12-04 NOTE — ED NOTES
Patient back to [facility] at this time per [facility].      Rachel Marc RN  12/04/20 1949

## 2020-12-04 NOTE — ED NOTES
Report called to Marlene Kim at [address] [facility] care and rehab at this time.      Fannie Donato RN  12/04/20 7867

## 2024-07-01 NOTE — H&P
"  Linda Puentes INTENSIVIST   INITIAL HOSPITAL VISIT NOTE     Hospital:  LOS: 0 days     Ms. Cheyanne Bella, 89 y.o. female is seen for a Chief Complaint of:  Chief Complaint   Patient presents with   • Stroke     Principal Problem:    Acute CVA (cerebrovascular accident) (CMS/HCC)  Active Problems:    Hypertension    Hyperlipemia    Coronary artery disease    Paroxysmal atrial fibrillation (CMS/HCC)      Subjective   S     Ms. Bella is a 89yo F who presented to the [facility] ED at 1157 via after after EMS was called by family who noticed a new left sided facial droop and slurred speech and weakness. The patient notes that she suddenly felt as if she couldn't stand. She was last known well at 0830 this morning. In the ED, she had a CT head performed which was negative for an acute intracranial process. She was then given TPA. CT perfusion was performed which showed an area of ischemia in the posterior and superior right frontal lobe with a small underlying core infarct. Neuro-interventional evaluated her imaging and did not think that she was a candidate for further intervention in the cath lab. She has also been going in and out of Afib which is new for her.        PMH: She  has a past medical history of Colon cancer (CMS/HCC); Coronary artery disease; Hyperlipemia; and Hypertension.   PSxH: She  has a past surgical history that includes Hysterectomy; Colon surgery; Colostomy; Hernia repair; Coronary stent placement; and Skin cancer excision.      Medications:  No current facility-administered medications on file prior to encounter.      Current Outpatient Prescriptions on File Prior to Encounter   Medication Sig   • clopidogrel (PLAVIX) 75 MG tablet    • furosemide (LASIX) 40 MG tablet    • HYDROcodone-acetaminophen (NORCO) 7.5-325 MG per tablet    • losartan (COZAAR) 50 MG tablet    • potassium chloride (K-DUR,KLOR-CON) 20 MEQ CR tablet    • omega-3 acid ethyl esters (LOVAZA) 1 G capsule    • polyethylene glycol (MIRALAX) powder         Allergies: She has No Known Allergies.   FH: Her family history is not on file.   SH: She  reports that she has never smoked. She does not have any smokeless tobacco history on file.     The patient's relevant past medical, surgical and social history were reviewed and updated in Epic as appropriate.     ROS (14):   Review of Systems   HENT: Negative.    Eyes: Negative.    Respiratory: Negative.    Cardiovascular: Negative.    Gastrointestinal: Negative.    Endocrine: Negative.    Genitourinary: Negative.    Musculoskeletal: Negative.    Skin: Negative.    Allergic/Immunologic: Negative.    Neurological: Positive for facial asymmetry, speech difficulty and weakness.   Hematological: Negative.    Psychiatric/Behavioral: Negative.        Objective   O     Vitals    Temp  Min: 98 °F (36.7 °C)  Max: 98.6 °F (37 °C)  BP  Min: 154/97  Max: 180/101  Pulse  Min: 89  Max: 117  Resp  Min: 16  Max: 18  SpO2  Min: 90 %  Max: 98 % No Data Recorded     I/O 24 hrs (7:00AM - 6:59 AM)   Intake/Output       07/04/18 0700 - 07/05/18 0659 07/05/18 0700 - 07/06/18 0659    Intake (ml) -- --    Output (ml) 0 450    Net (ml) 0 -450    Last Weight  --  109 kg (240 lb)          Medications (drips):    niCARdipine       Physical Examination    Telemetry: Paroxysmal Afib.    Constitutional:  No acute distress.  Conversant.   HEENT: Normocephalic and atraumatic.   Neck:  Normal range of motion. Neck supple.   No JVD present.   No thyromegaly.    Cardiovascular: Irregularly irregular rhythm.   No murmurs, rub or gallop.  No peripheral edema.   Respiratory: Normal respiratory effort.  Clear to ascultation.   Abdominal:  Soft. No masses.   Non-tender. No distension.   No hepatosplenomegaly.   MSK: Normal muscle strength and tone.   Extremities: No digital cyanosis or clubbing.   Skin: Skin is warm and dry.  No rashes, lesions or ulcers noted.    Neurological:   Alert and Oriented to person, place, and time.   Moves all extremities.  Psychiatric:  Normal affect.   Normal judgment.     Interval:  (ICU)  1a. Level of Consciousness: 0-->Alert, keenly responsive  1b. LOC Questions: 0-->Answers both questions correctly  1c. LOC Commands: 0-->Performs both tasks correctly  2. Best Gaze: 0-->Normal  3. Visual: 0-->No visual loss  4. Facial Palsy: 1-->Minor paralysis (flattened nasolabial fold, asymmetry on smiling)  5a. Motor Arm, Left: 1-->Drift, limb holds 90 (or 45) degrees, but drifts down before full 10 seconds, does not hit bed or other support  5b. Motor Arm, Right: 0-->No drift, limb holds 90 (or 45) degrees for full 10 secs  6a. Motor Leg, Left: 1-->Drift, leg falls by the end of the 5-sec period but does not hit bed  6b. Motor Leg, Right: 0-->No drift, leg holds 30 degree position for full 5 secs  7. Limb Ataxia: 0-->Absent  8. Sensory: 1-->Mild-to-moderate sensory loss, patient feels pinprick is less sharp or is dull on the affected side, or there is a loss of superficial pain with pinprick, but patient is aware of being touched  9. Best Language: 0-->No aphasia, normal  10. Dysarthria: 1-->Mild-to-moderate dysarthria, patient slurs at least some words and, at worst, can be understood with some difficulty  11. Extinction and Inattention (formerly Neglect): 0-->No abnormality    Total (NIH Stroke Scale): 5         Results from last 7 days  Lab Units 07/05/18  1215 07/05/18  1209   WBC 10*3/mm3 5.10  --    HEMOGLOBIN g/dL 11.8  --    HEMOGLOBIN, POC g/dL  --  11.6*   MCV fL 83.9  --    PLATELETS 10*3/mm3 145*  --        Results from last 7 days  Lab Units 07/05/18  1209   CREATININE mg/dL 1.00     Estimated Creatinine Clearance: 43.3 mL/min (by C-G formula based on SCr of 1 mg/dL).    Results from last 7 days  Lab Units 07/05/18  1215   ALT (SGPT) U/L 15   AST (SGOT) U/L 19         Images:    Imaging Results (last 24 hours)     Procedure Component Value Units Date/Time    CT Cerebral Perfusion With & Without Contrast [[id number]] Collected:   jose antonio Messer, MARINA, APRN     07/05/18 1441     Updated:  07/05/18 1441    Narrative:       EXAMINATION: CT CEREBRAL PERFUSION W WO CONTRAST- 07/05/2018     INDICATION: TIA, initial screening; I63.9-Cerebral infarction,  unspecified; I48.91-Unspecified atrial fibrillation; Z92.82-Status post  administration of TPA (RTPA) in a different facility within the last 24  hours prior to admission to current facility         TECHNIQUE: Cerebral perfusion analysis was performed using computed  tomography with contrast administration including postprocessing of  parametric maps with determination of cerebral blood flow, cerebral  blood volume, and mean transit time.     The radiation dose reduction device was turned on for each scan per the  ALARA (As Low as Reasonably Achievable) protocol.     COMPARISON: Unenhanced head CT scan of same date.      FINDINGS: There is an approximately 8 x 4 cm area of increased mean  transit time and time to drain in the posterior and superior right  frontal lobe corresponding to similar sized area of decreased cerebral  blood flow. There is a small irregular underlying area of decreased  cerebral blood volume approximately 3.5 x 1.5 cm, consistent with small  core infarct. No significant perfusion abnormality is seen elsewhere.       Impression:       8 x 4 cm area of ischemia in the posterior and superior  right frontal lobe, with small underlying core infarct.     D:  07/05/2018  E:  07/05/2018       XR Chest 1 View [[id number]] Collected:  07/05/18 1417     Updated:  07/05/18 1417    Narrative:       EXAMINATION: XR CHEST 1 VW- 07/05/2018     INDICATION: Acute Stroke Protocol (onset < 12 hrs); I63.9-Cerebral  infarction, unspecified; I48.91-Unspecified atrial fibrillation;  Z92.82-Status post administration of tPA (rtPA) in a different facility  within the last 24 hours prior to admission to current facility      COMPARISON: NONE     FINDINGS: Heart shadow is mildly enlarged. Vasculature is slightly  cephalized. There is  perihilar interstitial change, possibly very early  interstitial edema, and minimal left basilar atelectasis. No  pneumothorax is seen.       Impression:       Cardiomegaly and mild pulmonary vascular congestion. Mild  left basilar atelectasis noted.     D:  07/05/2018  E:  07/05/2018          CT Angiogram Neck With & Without Contrast [[id number]] Collected:  07/05/18 1312     Updated:  07/05/18 1312    Narrative:       EXAMINATION: CT ANGIOGRAM NECK W WO CONTRAST- 07/05/2018     INDICATION: Stroke; I63.9-Cerebral infarction, unspecified;  I48.91-Unspecified atrial fibrillation; Z92.82-Status post  administration of TPA (RTPA) in a different facility within the last 24  hours prior to admission to current facility         TECHNIQUE: Pre and postcontrast 0.75 mm axial images through the neck  with sagittal and coronal 2-D reconstructions.     The radiation dose reduction device was turned on for each scan per the  ALARA (As Low as Reasonably Achievable) protocol.     COMPARISON: NONE     FINDINGS: Proximal subclavian arteries appear normally patent.     On the right, no significant common, internal, or external carotid  artery stenosis is seen.     On the left, no significant common, internal or external carotid artery  stenosis is seen.     Vertebral arteries appear to be codominant and normal in appearance.       Impression:       Neck CTA appears within normal limits.     D:  07/05/2018  E:  07/05/2018       CT Head Without Contrast Stroke Protocol [[id number]] Collected:  07/05/18 1253     Updated:  07/05/18 1253    Narrative:       EXAMINATION: CT HEAD WO CONTRAST STROKE PROTOCOL-      INDICATION: Stroke.     TECHNIQUE: 5 mm unenhanced images through the brain.     The radiation dose reduction device was turned on for each scan per the  ALARA (As Low as Reasonably Achievable) protocol.     COMPARISON: None.     FINDINGS: The calvarium appears intact. Included paranasal sinuses and  mastoids appear clear. Soft  tissue window images show mild generalized  cerebral atrophy for patient's age. Some mild patchy white matter  changes are seen in the subcortical white matter of the frontal lobes,  likely chronic. There is no evidence to suggest acute infarction, no  evidence of intracranial hemorrhage, mass or mass effect, hydrocephalus,  or abnormal extraaxial collection.       Impression:       No evidence of acute intracranial disease.     NOTE: Exam time is shown as 11:54 AM. Preliminary report was given to  Dr. Cardenas at 11:57 AM     D:  07/05/2018  E:  07/05/2018       CT Angiogram Head With & Without Contrast [[id number]] Updated:  07/05/18 1239        ECG/EMG Results (last 24 hours)     Procedure Component Value Units Date/Time    ECG 12 Lead [[id number]] Collected:  07/05/18 1223     Updated:  07/05/18 1231    Narrative:       Test Reason : Acute Stroke Protocol (onset < 12 hrs)  Blood Pressure : **/** mmHG  Vent. Rate : 085 BPM     Atrial Rate : 091 BPM     P-R Int : 000 ms          QRS Dur : 106 ms      QT Int : 374 ms       P-R-T Axes : 000 040 053 degrees     QTc Int : 445 ms    Atrial fibrillation  Abnormal ECG  No previous ECGs available  Confirmed by MADELYN BURCIAGA MD ([id number]) on 7/5/2018 12:31:40 PM    Referred By:  PATRICA           Confirmed By:MADELYN BURCIAGA MD          I reviewed the patient's new laboratory and imaging results.  I independently reviewed the patient's new images.    Assessment/Plan   A / P     Ms. Bella is a 89yo F who is admitted with an acute CVA and is s/p TPA. She was found to have new atrial fibrillation which may likely be the cause of her stroke. She is admitted to the ICU for closer monitoring.       Nutrition:   NPO Diet  Advance Directives:   Code Status and Medical Interventions:   Ordered at: 07/05/18 1444     Code Status:    CPR     Medical Interventions (Level of Support Prior to Arrest):    Full       Hospital Problem List     * (Principal)Acute CVA (cerebrovascular accident) (CMS/[facility])     Hypertension    Hyperlipemia    Coronary artery disease    Paroxysmal atrial fibrillation (CMS/[facility])          Assessment / Plan:    1. Admit to ICU  2. Post-TPA order set.   3. Echocardiogram with bubble.   4. CT head 24 hours post-TPA or for neurologic decline  5. Neurology evaluation.   6. Start a statin.   7. She will likely need long-term anticoagulation after cleared from TPA for new Afib.   8. AM labs    Discussed with the patient and family at bedside.       I discussed the patient's findings and my recommendations with patient, family and nursing staff    Time:   Critical Care Time: was greater than 40 minutes.(excluding time spent on other separately billable procedures or treating other patients).     Anabel V Case, DO  Pulmonary and Critical Care Medicine